# Patient Record
Sex: FEMALE | Race: BLACK OR AFRICAN AMERICAN | NOT HISPANIC OR LATINO | URBAN - METROPOLITAN AREA
[De-identification: names, ages, dates, MRNs, and addresses within clinical notes are randomized per-mention and may not be internally consistent; named-entity substitution may affect disease eponyms.]

---

## 2017-01-24 ENCOUNTER — INPATIENT (INPATIENT)
Facility: HOSPITAL | Age: 82
LOS: 2 days | Discharge: HOME HEALTH | DRG: 683 | End: 2017-01-27
Attending: INTERNAL MEDICINE | Admitting: INTERNAL MEDICINE
Payer: COMMERCIAL

## 2017-01-24 VITALS
SYSTOLIC BLOOD PRESSURE: 167 MMHG | OXYGEN SATURATION: 100 % | DIASTOLIC BLOOD PRESSURE: 79 MMHG | TEMPERATURE: 98 F | RESPIRATION RATE: 17 BRPM | HEART RATE: 68 BPM

## 2017-01-24 DIAGNOSIS — D64.9 ANEMIA, UNSPECIFIED: ICD-10-CM

## 2017-01-24 DIAGNOSIS — E87.6 HYPOKALEMIA: ICD-10-CM

## 2017-01-24 DIAGNOSIS — E83.51 HYPOCALCEMIA: ICD-10-CM

## 2017-01-24 DIAGNOSIS — E83.42 HYPOMAGNESEMIA: ICD-10-CM

## 2017-01-24 DIAGNOSIS — R60.0 LOCALIZED EDEMA: ICD-10-CM

## 2017-01-24 DIAGNOSIS — Z90.49 ACQUIRED ABSENCE OF OTHER SPECIFIED PARTS OF DIGESTIVE TRACT: Chronic | ICD-10-CM

## 2017-01-24 DIAGNOSIS — Z90.710 ACQUIRED ABSENCE OF BOTH CERVIX AND UTERUS: Chronic | ICD-10-CM

## 2017-01-24 DIAGNOSIS — I50.22 CHRONIC SYSTOLIC (CONGESTIVE) HEART FAILURE: ICD-10-CM

## 2017-01-24 DIAGNOSIS — I25.10 ATHEROSCLEROTIC HEART DISEASE OF NATIVE CORONARY ARTERY WITHOUT ANGINA PECTORIS: ICD-10-CM

## 2017-01-24 DIAGNOSIS — J45.909 UNSPECIFIED ASTHMA, UNCOMPLICATED: ICD-10-CM

## 2017-01-24 DIAGNOSIS — Z92.89 PERSONAL HISTORY OF OTHER MEDICAL TREATMENT: Chronic | ICD-10-CM

## 2017-01-24 DIAGNOSIS — R25.1 TREMOR, UNSPECIFIED: ICD-10-CM

## 2017-01-24 DIAGNOSIS — N18.4 CHRONIC KIDNEY DISEASE, STAGE 4 (SEVERE): ICD-10-CM

## 2017-01-24 LAB
ALBUMIN SERPL ELPH-MCNC: 2.3 G/DL — LOW (ref 3.4–5)
ALBUMIN SERPL ELPH-MCNC: 2.3 G/DL — LOW (ref 3.4–5)
ALBUMIN SERPL ELPH-MCNC: 2.6 G/DL — LOW (ref 3.4–5)
ALP SERPL-CCNC: 54 U/L — SIGNIFICANT CHANGE UP (ref 40–120)
ALP SERPL-CCNC: 59 U/L — SIGNIFICANT CHANGE UP (ref 40–120)
ALP SERPL-CCNC: 63 U/L — SIGNIFICANT CHANGE UP (ref 40–120)
ALT FLD-CCNC: 19 U/L — SIGNIFICANT CHANGE UP (ref 12–42)
ALT FLD-CCNC: 21 U/L — SIGNIFICANT CHANGE UP (ref 12–42)
ALT FLD-CCNC: 24 U/L — SIGNIFICANT CHANGE UP (ref 12–42)
ANION GAP SERPL CALC-SCNC: 15 MMOL/L — SIGNIFICANT CHANGE UP (ref 9–16)
ANION GAP SERPL CALC-SCNC: 16 MMOL/L — SIGNIFICANT CHANGE UP (ref 9–16)
ANION GAP SERPL CALC-SCNC: 19 MMOL/L — HIGH (ref 9–16)
ANISOCYTOSIS BLD QL: SLIGHT — SIGNIFICANT CHANGE UP
APTT BLD: 36.4 SEC — SIGNIFICANT CHANGE UP (ref 27.5–37.4)
AST SERPL-CCNC: 19 U/L — SIGNIFICANT CHANGE UP (ref 15–37)
AST SERPL-CCNC: 26 U/L — SIGNIFICANT CHANGE UP (ref 15–37)
AST SERPL-CCNC: 33 U/L — SIGNIFICANT CHANGE UP (ref 15–37)
BASOPHILS NFR BLD AUTO: 0.2 % — SIGNIFICANT CHANGE UP (ref 0–2)
BILIRUB SERPL-MCNC: 0.6 MG/DL — SIGNIFICANT CHANGE UP (ref 0.2–1.2)
BILIRUB SERPL-MCNC: 0.6 MG/DL — SIGNIFICANT CHANGE UP (ref 0.2–1.2)
BILIRUB SERPL-MCNC: 1 MG/DL — SIGNIFICANT CHANGE UP (ref 0.2–1.2)
BLD GP AB SCN SERPL QL: NEGATIVE — SIGNIFICANT CHANGE UP
BUN SERPL-MCNC: 47 MG/DL — HIGH (ref 7–23)
BUN SERPL-MCNC: 52 MG/DL — HIGH (ref 7–23)
BUN SERPL-MCNC: 52 MG/DL — HIGH (ref 7–23)
CALCIUM SERPL-MCNC: <5 MG/DL — CRITICAL LOW (ref 8.5–10.5)
CHLORIDE SERPL-SCNC: 108 MMOL/L — SIGNIFICANT CHANGE UP (ref 96–108)
CHLORIDE SERPL-SCNC: 109 MMOL/L — HIGH (ref 96–108)
CHLORIDE SERPL-SCNC: 111 MMOL/L — HIGH (ref 96–108)
CK MB CFR SERPL CALC: 3.2 NG/ML — SIGNIFICANT CHANGE UP (ref 0.5–3.6)
CK MB CFR SERPL CALC: 3.6 NG/ML — SIGNIFICANT CHANGE UP (ref 0.5–3.6)
CK SERPL-CCNC: 368 U/L — HIGH (ref 26–192)
CK SERPL-CCNC: 383 U/L — HIGH (ref 26–192)
CO2 SERPL-SCNC: 19 MMOL/L — LOW (ref 22–31)
CO2 SERPL-SCNC: 20 MMOL/L — LOW (ref 22–31)
CO2 SERPL-SCNC: 21 MMOL/L — LOW (ref 22–31)
CREAT SERPL-MCNC: 2.34 MG/DL — HIGH (ref 0.5–1.3)
CREAT SERPL-MCNC: 2.46 MG/DL — HIGH (ref 0.5–1.3)
CREAT SERPL-MCNC: 2.47 MG/DL — HIGH (ref 0.5–1.3)
DACRYOCYTES BLD QL SMEAR: SLIGHT — SIGNIFICANT CHANGE UP
EOSINOPHIL NFR BLD AUTO: 0.5 % — SIGNIFICANT CHANGE UP (ref 0–6)
GLUCOSE SERPL-MCNC: 56 MG/DL — LOW (ref 70–99)
GLUCOSE SERPL-MCNC: 57 MG/DL — LOW (ref 70–99)
GLUCOSE SERPL-MCNC: 73 MG/DL — SIGNIFICANT CHANGE UP (ref 70–99)
HCT VFR BLD CALC: 32.2 % — LOW (ref 34.5–45)
HGB BLD-MCNC: 10.5 G/DL — LOW (ref 11.5–15.5)
HYPOCHROMIA BLD QL: SLIGHT — SIGNIFICANT CHANGE UP
INR BLD: 1.41 — HIGH (ref 0.88–1.16)
LYMPHOCYTES # BLD AUTO: 8.2 % — LOW (ref 13–44)
MCHC RBC-ENTMCNC: 24.8 PG — LOW (ref 27–34)
MCHC RBC-ENTMCNC: 32.6 G/DL — SIGNIFICANT CHANGE UP (ref 32–36)
MCV RBC AUTO: 75.9 FL — LOW (ref 80–100)
MICROCYTES BLD QL: SLIGHT — SIGNIFICANT CHANGE UP
MONOCYTES NFR BLD AUTO: 5.9 % — SIGNIFICANT CHANGE UP (ref 2–14)
NEUTROPHILS NFR BLD AUTO: 85.2 % — HIGH (ref 43–77)
OVALOCYTES BLD QL SMEAR: SLIGHT — SIGNIFICANT CHANGE UP
PLAT MORPH BLD: NORMAL — SIGNIFICANT CHANGE UP
PLATELET # BLD AUTO: 161 K/UL — SIGNIFICANT CHANGE UP (ref 150–400)
POIKILOCYTOSIS BLD QL AUTO: SLIGHT — SIGNIFICANT CHANGE UP
POTASSIUM SERPL-MCNC: 2.9 MMOL/L — CRITICAL LOW (ref 3.5–5.3)
POTASSIUM SERPL-MCNC: 2.9 MMOL/L — CRITICAL LOW (ref 3.5–5.3)
POTASSIUM SERPL-MCNC: 4.1 MMOL/L — SIGNIFICANT CHANGE UP (ref 3.5–5.3)
POTASSIUM SERPL-SCNC: 2.9 MMOL/L — CRITICAL LOW (ref 3.5–5.3)
POTASSIUM SERPL-SCNC: 2.9 MMOL/L — CRITICAL LOW (ref 3.5–5.3)
POTASSIUM SERPL-SCNC: 4.1 MMOL/L — SIGNIFICANT CHANGE UP (ref 3.5–5.3)
PROT SERPL-MCNC: 5.2 G/DL — LOW (ref 6.4–8.2)
PROT SERPL-MCNC: 5.5 G/DL — LOW (ref 6.4–8.2)
PROT SERPL-MCNC: 6 G/DL — LOW (ref 6.4–8.2)
PROTHROM AB SERPL-ACNC: 15.7 SEC — HIGH (ref 10–13.1)
RBC # BLD: 4.24 M/UL — SIGNIFICANT CHANGE UP (ref 3.8–5.2)
RBC # FLD: 19.3 % — HIGH (ref 10.3–16.9)
RBC BLD AUTO: (no result)
RH IG SCN BLD-IMP: POSITIVE — SIGNIFICANT CHANGE UP
SCHISTOCYTES BLD QL AUTO: SIGNIFICANT CHANGE UP
SODIUM SERPL-SCNC: 145 MMOL/L — SIGNIFICANT CHANGE UP (ref 135–145)
SODIUM SERPL-SCNC: 146 MMOL/L — HIGH (ref 135–145)
SODIUM SERPL-SCNC: 147 MMOL/L — HIGH (ref 135–145)
SPHEROCYTES BLD QL SMEAR: SIGNIFICANT CHANGE UP
TROPONIN I SERPL-MCNC: 0.21 NG/ML — HIGH (ref 0.01–0.04)
TROPONIN I SERPL-MCNC: 0.22 NG/ML — HIGH (ref 0.01–0.04)
WBC # BLD: 9.1 K/UL — SIGNIFICANT CHANGE UP (ref 3.8–10.5)
WBC # FLD AUTO: 9.1 K/UL — SIGNIFICANT CHANGE UP (ref 3.8–10.5)

## 2017-01-24 PROCEDURE — 99223 1ST HOSP IP/OBS HIGH 75: CPT

## 2017-01-24 PROCEDURE — 99285 EMERGENCY DEPT VISIT HI MDM: CPT | Mod: 25

## 2017-01-24 PROCEDURE — 93010 ELECTROCARDIOGRAM REPORT: CPT

## 2017-01-24 PROCEDURE — 93306 TTE W/DOPPLER COMPLETE: CPT | Mod: 26

## 2017-01-24 PROCEDURE — 71010: CPT | Mod: 26

## 2017-01-24 PROCEDURE — 93010 ELECTROCARDIOGRAM REPORT: CPT | Mod: 77

## 2017-01-24 RX ORDER — ATORVASTATIN CALCIUM 80 MG/1
40 TABLET, FILM COATED ORAL AT BEDTIME
Qty: 0 | Refills: 0 | Status: DISCONTINUED | OUTPATIENT
Start: 2017-01-24 | End: 2017-01-27

## 2017-01-24 RX ORDER — PREGABALIN 225 MG/1
1000 CAPSULE ORAL DAILY
Qty: 0 | Refills: 0 | Status: DISCONTINUED | OUTPATIENT
Start: 2017-01-24 | End: 2017-01-26

## 2017-01-24 RX ORDER — POTASSIUM CHLORIDE 20 MEQ
10 PACKET (EA) ORAL ONCE
Qty: 0 | Refills: 0 | Status: COMPLETED | OUTPATIENT
Start: 2017-01-24 | End: 2017-01-24

## 2017-01-24 RX ORDER — ZINC GLUCONATE 30 MG
100 TABLET ORAL DAILY
Qty: 0 | Refills: 0 | Status: DISCONTINUED | OUTPATIENT
Start: 2017-01-24 | End: 2017-01-27

## 2017-01-24 RX ORDER — LEVALBUTEROL 1.25 MG/.5ML
0.63 SOLUTION, CONCENTRATE RESPIRATORY (INHALATION) EVERY 4 HOURS
Qty: 0 | Refills: 0 | Status: DISCONTINUED | OUTPATIENT
Start: 2017-01-24 | End: 2017-01-27

## 2017-01-24 RX ORDER — DIPHENHYDRAMINE HCL 50 MG
1 CAPSULE ORAL
Qty: 0 | Refills: 0 | COMMUNITY

## 2017-01-24 RX ORDER — POTASSIUM CHLORIDE 20 MEQ
40 PACKET (EA) ORAL ONCE
Qty: 0 | Refills: 0 | Status: COMPLETED | OUTPATIENT
Start: 2017-01-24 | End: 2017-01-24

## 2017-01-24 RX ORDER — NITROGLYCERIN 6.5 MG
0.4 CAPSULE, EXTENDED RELEASE ORAL ONCE
Qty: 0 | Refills: 0 | Status: COMPLETED | OUTPATIENT
Start: 2017-01-24 | End: 2017-01-24

## 2017-01-24 RX ORDER — LORATADINE 10 MG/1
1 TABLET ORAL
Qty: 0 | Refills: 0 | COMMUNITY

## 2017-01-24 RX ORDER — FOLIC ACID 0.8 MG
1 TABLET ORAL DAILY
Qty: 0 | Refills: 0 | Status: DISCONTINUED | OUTPATIENT
Start: 2017-01-24 | End: 2017-01-27

## 2017-01-24 RX ORDER — ACETAMINOPHEN 500 MG
650 TABLET ORAL EVERY 6 HOURS
Qty: 0 | Refills: 0 | Status: DISCONTINUED | OUTPATIENT
Start: 2017-01-24 | End: 2017-01-27

## 2017-01-24 RX ORDER — CALCIUM GLUCONATE 100 MG/ML
2 VIAL (ML) INTRAVENOUS ONCE
Qty: 0 | Refills: 0 | Status: COMPLETED | OUTPATIENT
Start: 2017-01-24 | End: 2017-01-24

## 2017-01-24 RX ORDER — CHOLECALCIFEROL (VITAMIN D3) 125 MCG
400 CAPSULE ORAL DAILY
Qty: 0 | Refills: 0 | Status: DISCONTINUED | OUTPATIENT
Start: 2017-01-24 | End: 2017-01-27

## 2017-01-24 RX ORDER — MAGNESIUM SULFATE 500 MG/ML
1 VIAL (ML) INJECTION ONCE
Qty: 0 | Refills: 0 | Status: COMPLETED | OUTPATIENT
Start: 2017-01-24 | End: 2017-01-24

## 2017-01-24 RX ORDER — CALCIUM GLUCONATE 100 MG/ML
1 VIAL (ML) INTRAVENOUS ONCE
Qty: 0 | Refills: 0 | Status: DISCONTINUED | OUTPATIENT
Start: 2017-01-24 | End: 2017-01-24

## 2017-01-24 RX ORDER — THIAMINE MONONITRATE (VIT B1) 100 MG
100 TABLET ORAL DAILY
Qty: 0 | Refills: 0 | Status: DISCONTINUED | OUTPATIENT
Start: 2017-01-24 | End: 2017-01-27

## 2017-01-24 RX ORDER — PANTOPRAZOLE SODIUM 20 MG/1
40 TABLET, DELAYED RELEASE ORAL
Qty: 0 | Refills: 0 | Status: DISCONTINUED | OUTPATIENT
Start: 2017-01-24 | End: 2017-01-27

## 2017-01-24 RX ORDER — TAMSULOSIN HYDROCHLORIDE 0.4 MG/1
0.4 CAPSULE ORAL AT BEDTIME
Qty: 0 | Refills: 0 | Status: DISCONTINUED | OUTPATIENT
Start: 2017-01-24 | End: 2017-01-27

## 2017-01-24 RX ORDER — FLUTICASONE PROPIONATE AND SALMETEROL 50; 250 UG/1; UG/1
1 POWDER ORAL; RESPIRATORY (INHALATION)
Qty: 0 | Refills: 0 | Status: DISCONTINUED | OUTPATIENT
Start: 2017-01-24 | End: 2017-01-27

## 2017-01-24 RX ORDER — POTASSIUM CHLORIDE 20 MEQ
40 PACKET (EA) ORAL ONCE
Qty: 0 | Refills: 0 | Status: DISCONTINUED | OUTPATIENT
Start: 2017-01-24 | End: 2017-01-24

## 2017-01-24 RX ORDER — MAGNESIUM HYDROXIDE 400 MG/1
15 TABLET, CHEWABLE ORAL
Qty: 0 | Refills: 0 | COMMUNITY

## 2017-01-24 RX ORDER — HYDRALAZINE HCL 50 MG
50 TABLET ORAL EVERY 8 HOURS
Qty: 0 | Refills: 0 | Status: DISCONTINUED | OUTPATIENT
Start: 2017-01-24 | End: 2017-01-27

## 2017-01-24 RX ORDER — CARVEDILOL PHOSPHATE 80 MG/1
6.25 CAPSULE, EXTENDED RELEASE ORAL EVERY 12 HOURS
Qty: 0 | Refills: 0 | Status: DISCONTINUED | OUTPATIENT
Start: 2017-01-24 | End: 2017-01-27

## 2017-01-24 RX ORDER — TUBERCULIN PURIFIED PROTEIN DERIVATIVE 5 [IU]/.1ML
0 INJECTION, SOLUTION INTRADERMAL
Qty: 0 | Refills: 0 | COMMUNITY

## 2017-01-24 RX ORDER — AMLODIPINE BESYLATE 2.5 MG/1
1 TABLET ORAL
Qty: 0 | Refills: 0 | COMMUNITY

## 2017-01-24 RX ORDER — LORATADINE 10 MG/1
10 TABLET ORAL DAILY
Qty: 0 | Refills: 0 | Status: DISCONTINUED | OUTPATIENT
Start: 2017-01-24 | End: 2017-01-27

## 2017-01-24 RX ADMIN — Medication 0.4 MILLIGRAM(S): at 13:07

## 2017-01-24 RX ADMIN — Medication 100 GRAM(S): at 14:48

## 2017-01-24 RX ADMIN — Medication 50 MILLIEQUIVALENT(S): at 17:45

## 2017-01-24 RX ADMIN — FLUTICASONE PROPIONATE AND SALMETEROL 1 DOSE(S): 50; 250 POWDER ORAL; RESPIRATORY (INHALATION) at 22:40

## 2017-01-24 RX ADMIN — Medication 0.4 MILLIGRAM(S): at 12:51

## 2017-01-24 RX ADMIN — Medication 50 MILLIGRAM(S): at 21:10

## 2017-01-24 RX ADMIN — Medication 100 GRAM(S): at 16:06

## 2017-01-24 RX ADMIN — Medication 100 MILLIEQUIVALENT(S): at 14:11

## 2017-01-24 RX ADMIN — ATORVASTATIN CALCIUM 40 MILLIGRAM(S): 80 TABLET, FILM COATED ORAL at 21:11

## 2017-01-24 RX ADMIN — TAMSULOSIN HYDROCHLORIDE 0.4 MILLIGRAM(S): 0.4 CAPSULE ORAL at 21:10

## 2017-01-24 RX ADMIN — Medication 40 MILLIEQUIVALENT(S): at 18:32

## 2017-01-24 RX ADMIN — Medication 200 GRAM(S): at 15:42

## 2017-01-24 NOTE — H&P ADULT. - HISTORY OF PRESENT ILLNESS
History obtained from patient, daughter Pascale Basilio, prior history and hospital notes    83 y/o F DNR/DNI (MOLST form copy from Rehab in chart) Multiple allergies including ASA, Shellfish, ARB Allergy from [facility], w/ PMHX HTN, HLD, recent diagnosis of Takotsubo Cardiomyopathy/Systolic CHF EF 25-30% on Echo in 12/2016, CKD stage 4 w/ h/o Urinary Retention, Asthma/COPD s/p recent exacerbation in 12/2016, Temporal Arteritis complicated by L eye legal blindness, RA, GERD, Chronic Anemia s/p blood transfusion in 10/2017, 11/2017 and 12/2017, h/o hypocalcemia, SBO s/p bowel resection (10/2016) w/ h/o Short Gut Syndrome  initially lost 15-20 lb weight loss and then gained back 5lb since procedure, admitted to [facility] in 11-12/2016 w/ Asthma Exacerbation 2nd to +RSV, readmitted in 12/2016 w/ tachypnea and hypertensive urgency diagnosed w/ Asthma Exacerbation 2nd RSV, noted to have NSTEMI peak trop 5.9 medically managed w/ Plavix and Hep gtt (both d/c’d that admission due to acute on chronic anemia), treated w/ BB for SVT, irby was removed and pt passed trial of void for urinary retention and discharged to rehab 12/31/17, who presents from rehab to [facility] ER 1/24/17, today, due to 3/10 SS "heavy" intermittent non-radiating C/P lasting 5 minutes to 30 minutes. Pt BIBA to [facility], on arrival to [facility] due to C/P pt received SL NTG x 2 w/ relief. Pt also noted mild tremors per patient and daughter to b/l shoulder and UE 2nd to acute hypokalemia, acute hypocalcemia and acute hypomagnesemia. Daughter reports h/o LE edema improved.  Pt denies SOB, dizziness, diaphoresis, fatigue/drowsiness, palpitations, orthopnea, PND, syncope, muscle cramping, confusion paresthesias, tingling in the lips and fingers, irritability, constipation, seizures, insomnia, muscle weakness.     In ER /74, HR 68, RR 16, T 97.5, O2 100% on RA. Labs significant for K 2.9, repeat K 4.1 (moderate hemolysis), repeat K 2.9, Ca < 5.0 Albumin 2.6  (Ca was 6.3-8.0 in 12/2016), Mg 1.1, Troponin 0.223, CPK  383, BNP 12,000 (BNP was 2,900 in 12/2016) CXR reported as clear today per official radiology read, BUN/Cr. 47-52/2.3-2.4 in ER today (Cr. 1.61-2.08 in 12/2016), Na 146-147, repeat Na 145, H/H 10.5/35 (H/H noted to be 7.9/24 -10.5/32 in 12/2017, INR 1.4 (LFTs normal), EKG in ER showed SR @ 71bpm w/ PACs, new TWI in V2-V6, AVL, ST depression 1mm in V3, 0.5mm in V6, old TWI in AVL. Initial QTC on , repeat . Pt received Potassium Chloride 10meq IV and Magnesium 1g IV only at the time of me seeing the patient. Pt reported being C/P free and w/ much reduced b/l shoulder and UE tremors on assessment.  Pt admitted to [facility] for further management.   Echo 12/2016 showed: mild LVH, entire apical two thirds of LV are severely hypokinetic to akinetic, wall motion abnormalities are consistent with apical ballooning (Takasubu) syndrome vs. injury at the left anterior descending territory, EF 25-30%, mild AR, mod. TR, mild pulm HTN (PASP 45mmHg), mild elev. LA pressure, small pericardial effusion.

## 2017-01-24 NOTE — ED ADULT NURSE NOTE - OBJECTIVE STATEMENT
Pt w/ PMH of heart attack on 12/23/16, w/o stent placement, asthma and CHF presents to ED today c/o mid-epigastric chest pressure without radiation for 3 hours.  Pt describes discomfort as constant pressure, but denies pain.  Pt states it is difficult to catch her breath, but her O2 sat is 100% on room air and she has unlabored respirations.  Pt also states increased pedal edema for the past several weeks, but peripheral pulses are palpable and equal in lower extremities.  Pt denies abd discomfort, N/V, changes to bowel or bladder habits, visual changes, light-headedness/dizziness or headache.  Pt states she has not eaten today d/t her discomfort.  Pt is resting on monitor with family at bedside.

## 2017-01-24 NOTE — H&P ADULT. - PROBLEM SELECTOR PLAN 2
No ACEI/ARB due to ACEI allergy and worsening CKD. Hold Lasix due to acute hypokalemia, acute hypocalcemia and acute hypomagnesemia, worsening renal function per Randi De Luna (Renal consult).  CXR showed clear lungs. BNP elevated likely in the setting of Cr. 2.4. Mild crackles R base. O2 sat. 100% on RA. Cont. to monitor. Closely monitor fluid status, electrolytes and assess resuming Lasix tomorrow    HTN  BP stable. Cont. Coreg, Hydralazine. Lasix on hold. No ACEI/ARB due to ACEI allergy and worsening CKD. Hold Lasix due to acute hypokalemia, acute hypocalcemia and acute hypomagnesemia, worsening renal function per Randi De Luna (Renal consult).  CXR showed clear lungs. BNP elevated likely in the setting of Cr. 2.4. Mild crackles R base. O2 sat. 100% on RA. Cont. to monitor. Closely monitor fluid status, electrolytes and assess resuming Lasix tomorrow    HTN  BP stable. Cont. Coreg, Hydralazine. Lasix on hold

## 2017-01-24 NOTE — CONSULT NOTE ADULT - SUBJECTIVE AND OBJECTIVE BOX
HPI:  History obtained from patient, daughter Pascale Basilio, prior history and hospital notes    85 y/o F DNR/DNI (MOLST form copy from Rehab in chart) Multiple allergies including ASA, Shellfish, ARB Allergy from [facility], w/ PMHX HTN, HLD, recent diagnosis of Takotsubo Cardiomyopathy/Systolic CHF EF 25-30% on Echo in 2016, CKD stage 4 w/ h/o Urinary Retention, Asthma/COPD s/p recent exacerbation in 2016, Temporal Arteritis complicated by L eye legal blindness, RA, GERD, Chronic Anemia s/p blood transfusion in 10/2017, 2017 and 2017, h/o hypocalcemia, SBO s/p bowel resection (10/2016) w/ h/o Short Gut Syndrome  initially lost 15-20 lb weight loss and then gained back 5lb since procedure, admitted to [facility] in -2016 w/ Asthma Exacerbation 2nd to +RSV, readmitted in 2016 w/ tachypnea and hypertensive urgency diagnosed w/ Asthma Exacerbation 2nd RSV, noted to have NSTEMI peak trop 5.9 medically managed w/ Plavix and Hep gtt (both d/c’d that admission due to acute on chronic anemia), treated w/ BB for SVT, irby was removed and pt passed trial of void for urinary retention and discharged to rehab 17. Presents today with CP, found to have SCr 2.4, calcium 5, K 2.9.     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: S/P partial hysterectomy      MEDICATIONS:  levalbuterol Inhalation 0.63milliGRAM(s) Inhalation every 4 hours PRN  predniSONE   Tablet 10milliGRAM(s) Oral daily  acetaminophen   Tablet 650milliGRAM(s) Oral every 6 hours PRN  tamsulosin 0.4milliGRAM(s) Oral at bedtime  loratadine 10milliGRAM(s) Oral daily  atorvastatin 40milliGRAM(s) Oral at bedtime  carvedilol 6.25milliGRAM(s) Oral every 12 hours  fluticasone / salmeterol 250-50 MICROgram(s) Diskus 1Dose(s) Inhalation two times a day  zinc gluconate 100milliGRAM(s) Oral daily  pantoprazole    Tablet 40milliGRAM(s) Oral before breakfast  hydrALAZINE 50milliGRAM(s) Oral every 8 hours  calcium carbonate 1250 mG + Vitamin D (OsCal 500 + D) 1Tablet(s) Oral two times a day  multivitamin 1Tablet(s) Oral daily  cyanocobalamin 1000MICROGram(s) Oral daily  folic acid 1milliGRAM(s) Oral daily  thiamine 100milliGRAM(s) Oral daily  cholecalciferol 400Unit(s) Oral daily      No pertinent family history in first degree relatives      SOCIAL HISTORY: No smoking.     REVIEW OF SYSTEMS:  Constitutional: No fevers. FATIGUED.  Card: No chest pain.   Resp: No SOB.  GI: No nausea or vomiting. No abdominal pain.  : No dysuria. Neuro: No focal weakness or sensory loss.  Eyes: No visual symptoms. MS: No joint swelling.  Skin: No rashes. Psych: No psychosis.    PHYSICAL EXAM:    Constitutional: T(C): 36.7, Max: 36.9 (-24 @ 15:27)  HR: 63 (63 - 81)  BP: 154/64 (144/80 - 179/79)  RR: 16 (16 - 17)  SpO2: 98% (98% - 100%)  Wt(kg): --  Appearance: LETHARGIC EASILY AROUSABLE.   Eyes: Conjunctivae pink, moist. Pupils equal and reactive.  ENT: External ears/nose normal appearing. Lips normal, dentition good.  Lymphatic: No cervical lymphadenopathy. No supraclavicular lymphadenopathy.  Cardiac: No rubs. NO MURMURS. Extremities: PITTING BILATERAL ANKLE EDEMA.   Respiratory: Effort no accessory muscle use. Lungs: DECREASED BREATH SOUNDS.   Abdomen: Soft. No masses. Nontender. Liver: Not palpable. Spleen: Not palpable.  Musculoskeltal digits: No clubbing or cyanosis. Tone normal. Moves all four extremities.  Skin inspection: No rashes. Palpation: No abnormalities.  Neuro: Cranial nerves: no facial assymetry or deficits noted. Sensation: LE intact to light touch.  Psych: Oriented to person, place, situation. Mood/affect: DEPRESSED.     DATA:  145    |  109<H>  |  52<H>  ----------------------------<  73     Ca:<5.0<LL> (2017 15:43)  2.9<LL>   |  21<L>  |  2.46<H>      eGFR if Non : 17 <L>  eGFR if African American: 20 <L>    TPro  5.2 g/dL<L>  /  Alb  2.3 g/dL<L>  /  TBili  1.0 mg/dL  /  DBili  x      /  AST  19 U/L  /  ALT  19 U/L  /  AlkPhos  54 U/L  2017 15:43                        10.5<L>  9.1   )-----------( 161      ( 2017 12:20 )             32.2<L>    Phos:-- M.1 mg/dL<L> PTH:-- Uric acid:-- Serum Osm:--  Ferritin:-- Iron:-- TIBC:-- Tsat:--  B12:-- TSH:-- ( @ 12:20)

## 2017-01-24 NOTE — ED ADULT NURSE NOTE - PSH
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

## 2017-01-24 NOTE — ED ADULT NURSE REASSESSMENT NOTE - NS ED NURSE REASSESS COMMENT FT1
Pt stated need for bedpan.  Pt able to lift self onto bedpan w/o complaint.  Pt made medium sized BM, soft, non-formed.  Pt repositioned in bed for comfort.  Repeat ECG performed and evaluated by Shelton.  Pt tx'd with 0.4mg Nitro.  Will continue to monitor.

## 2017-01-24 NOTE — ED PROVIDER NOTE - PROGRESS NOTE DETAILS
Patients pain significantly improved after 2 sublingual nitro, repeat EKG unchanged from EKG #2. Dr. Bowden notified, awaiting troponin. Will admit regardless, pt allergic to ASA, will consider heparin, pending trop vs possible non emergent cath for NSTEMI.

## 2017-01-24 NOTE — H&P ADULT. - PROBLEM SELECTOR PLAN 4
Potassium Chloride 10meq IV given in ER, last K 2.9. As per Dr. Bryan give Klor 40meq PO and Potassium 10meq IV and follow-up 11PM CMP. Further recs as per Dr. Bryan

## 2017-01-24 NOTE — ED PROVIDER NOTE - OBJECTIVE STATEMENT
83 yo female coming from [place] h/o CAD with recent admission for ACS allergic to ASA presenting to ER with CP that began this afternoon. Pt denies SOB, nausea, vomiting or abdominal pain. States that pain is now 5/10, non radiating. No weakness, changes in speech, numbness or tingling in extremities.

## 2017-01-24 NOTE — H&P ADULT. - PROBLEM SELECTOR PLAN 1
C/P relieved w/ NTG. H/o CAD. F/u Troponin at 6PM and 11PM. f/u Echo.   No ASA due to ASA allergy. No Plavix as per Dr. Jones due to h/o Anemia. Cont. Lipitor 40mg. Further plan as per Dr. Jones C/P relieved w/ NTG. H/o CAD. F/u Troponin at 6PM and 11PM. f/u Echo.   No ASA due to ASA allergy. No Plavix as per Dr. Jones due to h/o Anemia. Cont. Coreg,  Lipitor 40mg

## 2017-01-24 NOTE — H&P ADULT. - NEUROLOGICAL DETAILS
no focal deficit, motor strength 5/5 in b/l UE and LE, sensation intact in all extremities/alert and oriented x 3

## 2017-01-24 NOTE — ED PROVIDER NOTE - MEDICAL DECISION MAKING DETAILS
83 yo female with h/o recent MI, CHF history on lasix presenting with worsening CP since this afternoon with evolving EKG changes. CP improved significantly after nitro x 2. Pt has allergy to ASA. Will hold off on heparin at this time as per Dr. Sanchez, replete electrolytes, admit to cardiology.

## 2017-01-24 NOTE — ED PROVIDER NOTE - CARE PLAN
Principal Discharge DX:	NSTEMI (non-ST elevated myocardial infarction)  Secondary Diagnosis:	Hypokalemia  Secondary Diagnosis:	Hypocalcemia

## 2017-01-24 NOTE — H&P ADULT. - PROBLEM SELECTOR PLAN 9
Trace edema. Mild erythema to B/L shin perhaps secondary to irritation from compression stocking and prior edema. B/L mild calf tenderness. H/o LE venous duplex in 12/2016 negative for DVT. Afebrile and no leukocytosis. Monitor for now as discussed w/ Dr. Jones. No need for repeat LE duplex at this time per Dr. Jones

## 2017-01-24 NOTE — H&P ADULT. - PROBLEM SELECTOR PLAN 10
H/H 10.5/35, check Iron studies, B12/folate.  Cont. folic acid, thiamine, B12    H/o Short Gut Syndrome  Per daughter and pt, pt w/o recent constipation or diarrhea and no recent need for Loperamide or Colace.   DVT PPX: Intermittent Pneumatic Compression B/L LE as per Dr. Jones. If patient does not tolerate Heparin Sub Cut per Dr. Jones.    Code Status: DNR/DNI  DNR/DNI form in chart Dr. Jones to sign. Copy of MOLST form in chart. DNR/DNI order in sunrise.       Case discussed w/ Dr. Jones and Dr. Bryan

## 2017-01-24 NOTE — ED ADULT NURSE REASSESSMENT NOTE - NS ED NURSE REASSESS COMMENT FT1
Transferred care of pt from Lolita and Charlie RNs for break coverage. [facility] PA at bedside at this time. Repeat CMP and iCa drawn prior to start of Ca gluconate administration as per [facility] PAs request, sent to lab. Pt being given 2G Ca gluconate over 30 minutes as discussed with MD Peoples, [facility] PAs, and pharmacy. Pt pending 2nd gram of Mag sulfate after completion of first gram. Per MD Peoples pt needs echo today, hold off until completion of electrolyte drips. Pt with VS stable, resting comfortably, family at bedside at this time. Will continue to monitor.

## 2017-01-24 NOTE — H&P ADULT. - ASSESSMENT
85 y/o F DNR/DNI (MOLST form copy from Rehab in chart) ASA, Shellfish, ARB Allergy from [facility], w/ PMHX HTN, HLD, recent diagnosis of Takotsubo Cardiomyopathy/Systolic CHF EF 25-30% 12/2016, CKD stage 4 w/ h/o Urinary Retention, Asthma/COPD s/p recent exacerbation, Temporal Arteritis complicated by L eye legal blindness, SBO s/p bowel resection, RA, GERD, Chronic Anemia s/p prior blood transfusion, h/o hypocalcemia, h/o Asthma Exacerbation 2nd to +RSV in 12/2016, NSTEMI 12/2016 who presented w/ C/P and acute hypokalemia, acute hypocalcemia and acute hypomagnesemia, worsening renal function

## 2017-01-24 NOTE — H&P ADULT. - PROBLEM SELECTOR PLAN 7
Cr. 2.3-2.4 in ER today (Cr. 1.61-2.08 in 12/2016). F/u UA, Urine Electrolytes, Urine Cx, Renal US and Bladder Scan for post void residual. Lasix on hold as discussed w/ Dr. Bryan. Monitor I/Os and volume status.  On Flomax (h/o urinary retention)

## 2017-01-24 NOTE — H&P ADULT. - PROBLEM SELECTOR PLAN 3
Ca < 5.0. Repleted w/ 2 g IV Calcium Gluconate as per Dr. Bryan. Prolonged  repeat 452. Closely monitor Ca and EKG. Repeat CMP 11PM. f/u Ionized Calcium. Cont. PO Vitamin D and Calcium. Check Vitamin D level, PTH level, AM Cortisol. Further recs as per Dr. Bryan

## 2017-01-24 NOTE — ED ADULT NURSE NOTE - CHPI ED SYMPTOMS NEG
no back pain/no vomiting/no chills/no dizziness/no syncope/no diaphoresis/no cough/no nausea/no fever

## 2017-01-24 NOTE — ED ADULT NURSE NOTE - PMH
Asthma  Asthma  Chronic kidney disease  CKD (chronic kidney disease)  Essential hypertension  HTN (hypertension)  Gastroesophageal reflux disease  GERD (gastroesophageal reflux disease)  Heart attack    SBO (small bowel obstruction)    Temporal arteritis

## 2017-01-24 NOTE — ED ADULT NURSE REASSESSMENT NOTE - NS ED NURSE REASSESS COMMENT FT1
Pt states improved chest discomfort about 2 doses of 0.4mg sublingual nitro.  Will continue to monitor.

## 2017-01-24 NOTE — H&P ADULT. - PROBLEM SELECTOR PLAN 6
Monitor for signs and symptoms of electrolyte disturbance: tremors, muscle cramping, confusion paresthesias, tingling in the lips and fingers, irritability, constipation, seizures, insomnia, muscle weakness.    Neuro exam w/o any deficit other than known L eye blindness. Strength 5/5 in all extremities.     Improving, mild and intermittent to b/l UE and shoulders on last assessment. Noted in setting of electrolyte imbalance. Check TSH, T3, T4.    Monitor tele closely Monitor for signs and symptoms of electrolyte disturbance: tremors, muscle cramping, confusion paresthesias, tingling in the lips and fingers, irritability, constipation, seizures, insomnia, muscle weakness    Neuro exam w/o any deficit other than known L eye blindness. Strength 5/5 in all extremities.     Improving, mild and intermittent to b/l UE and shoulders on last assessment. Noted in setting of electrolyte imbalance. Check TSH, T3, T4.    Monitor tele closely

## 2017-01-24 NOTE — H&P ADULT. - PROBLEM SELECTOR PLAN 8
Asthma  Cont. Xopenex, Advair, on Prednisone 10mg daily for RA (on Protonix for GI protection)    RA  Cont. Prednisone 10mg daily

## 2017-01-25 DIAGNOSIS — Z02.9 ENCOUNTER FOR ADMINISTRATIVE EXAMINATIONS, UNSPECIFIED: ICD-10-CM

## 2017-01-25 DIAGNOSIS — E27.49 OTHER ADRENOCORTICAL INSUFFICIENCY: ICD-10-CM

## 2017-01-25 DIAGNOSIS — Z00.8 ENCOUNTER FOR OTHER GENERAL EXAMINATION: ICD-10-CM

## 2017-01-25 DIAGNOSIS — E88.9 METABOLIC DISORDER, UNSPECIFIED: ICD-10-CM

## 2017-01-25 LAB
24R-OH-CALCIDIOL SERPL-MCNC: 12.9 NG/ML — LOW (ref 30–100)
ALBUMIN SERPL ELPH-MCNC: 2.2 G/DL — LOW (ref 3.4–5)
ALBUMIN SERPL ELPH-MCNC: 2.3 G/DL — LOW (ref 3.4–5)
ALP SERPL-CCNC: 54 U/L — SIGNIFICANT CHANGE UP (ref 40–120)
ALP SERPL-CCNC: 54 U/L — SIGNIFICANT CHANGE UP (ref 40–120)
ALT FLD-CCNC: 18 U/L — SIGNIFICANT CHANGE UP (ref 12–42)
ALT FLD-CCNC: 18 U/L — SIGNIFICANT CHANGE UP (ref 12–42)
ANION GAP SERPL CALC-SCNC: 12 MMOL/L — SIGNIFICANT CHANGE UP (ref 9–16)
ANION GAP SERPL CALC-SCNC: 14 MMOL/L — SIGNIFICANT CHANGE UP (ref 9–16)
ANION GAP SERPL CALC-SCNC: 15 MMOL/L — SIGNIFICANT CHANGE UP (ref 9–16)
APPEARANCE UR: CLEAR — SIGNIFICANT CHANGE UP
AST SERPL-CCNC: 17 U/L — SIGNIFICANT CHANGE UP (ref 15–37)
AST SERPL-CCNC: 19 U/L — SIGNIFICANT CHANGE UP (ref 15–37)
BACTERIA # UR AUTO: (no result) /HPF
BILIRUB SERPL-MCNC: 0.5 MG/DL — SIGNIFICANT CHANGE UP (ref 0.2–1.2)
BILIRUB SERPL-MCNC: 0.6 MG/DL — SIGNIFICANT CHANGE UP (ref 0.2–1.2)
BILIRUB UR-MCNC: NEGATIVE — SIGNIFICANT CHANGE UP
BUN SERPL-MCNC: 48 MG/DL — HIGH (ref 7–23)
BUN SERPL-MCNC: 49 MG/DL — HIGH (ref 7–23)
BUN SERPL-MCNC: 54 MG/DL — HIGH (ref 7–23)
CA-I BLD-SCNC: 0.68 MMOL/L — CRITICAL LOW (ref 1.05–1.34)
CALCIUM SERPL-MCNC: 5.2 MG/DL — CRITICAL LOW (ref 8.5–10.5)
CALCIUM SERPL-MCNC: 5.4 MG/DL — CRITICAL LOW (ref 8.4–10.5)
CALCIUM SERPL-MCNC: 5.6 MG/DL — CRITICAL LOW (ref 8.5–10.5)
CALCIUM SERPL-MCNC: 6 MG/DL — CRITICAL LOW (ref 8.5–10.5)
CHLORIDE SERPL-SCNC: 110 MMOL/L — HIGH (ref 96–108)
CHLORIDE SERPL-SCNC: 110 MMOL/L — HIGH (ref 96–108)
CHLORIDE SERPL-SCNC: 111 MMOL/L — HIGH (ref 96–108)
CHOLEST SERPL-MCNC: 120 MG/DL — SIGNIFICANT CHANGE UP
CK MB CFR SERPL CALC: 2.6 NG/ML — SIGNIFICANT CHANGE UP (ref 0.5–3.6)
CK SERPL-CCNC: 307 U/L — HIGH (ref 26–192)
CO2 SERPL-SCNC: 19 MMOL/L — LOW (ref 22–31)
CO2 SERPL-SCNC: 21 MMOL/L — LOW (ref 22–31)
CO2 SERPL-SCNC: 22 MMOL/L — SIGNIFICANT CHANGE UP (ref 22–31)
COLOR SPEC: YELLOW — SIGNIFICANT CHANGE UP
CORTIS AM PEAK SERPL-MCNC: 1.8 UG/DL — LOW (ref 3.9–37.5)
CREAT ?TM UR-MCNC: 38.6 MG/DL — SIGNIFICANT CHANGE UP
CREAT SERPL-MCNC: 2.26 MG/DL — HIGH (ref 0.5–1.3)
CREAT SERPL-MCNC: 2.36 MG/DL — HIGH (ref 0.5–1.3)
CREAT SERPL-MCNC: 2.44 MG/DL — HIGH (ref 0.5–1.3)
DIFF PNL FLD: NEGATIVE — SIGNIFICANT CHANGE UP
EPI CELLS # UR: SIGNIFICANT CHANGE UP /HPF
EXTRA GREEN TOP TUBE: SIGNIFICANT CHANGE UP
FERRITIN SERPL-MCNC: 664.4 NG/ML — HIGH (ref 8–252)
FOLATE SERPL-MCNC: >20 NG/ML — SIGNIFICANT CHANGE UP (ref 4.8–24.2)
GLUCOSE SERPL-MCNC: 124 MG/DL — HIGH (ref 70–99)
GLUCOSE SERPL-MCNC: 63 MG/DL — LOW (ref 70–99)
GLUCOSE SERPL-MCNC: 88 MG/DL — SIGNIFICANT CHANGE UP (ref 70–99)
GLUCOSE UR QL: NEGATIVE — SIGNIFICANT CHANGE UP
HCT VFR BLD CALC: 26.8 % — LOW (ref 34.5–45)
HDLC SERPL-MCNC: 72 MG/DL — SIGNIFICANT CHANGE UP
HGB BLD-MCNC: 9 G/DL — LOW (ref 11.5–15.5)
IRON SATN MFR SERPL: 10 % — LOW (ref 20–38)
IRON SATN MFR SERPL: 15 UG/DL — LOW (ref 50–170)
KETONES UR-MCNC: NEGATIVE — SIGNIFICANT CHANGE UP
LEUKOCYTE ESTERASE UR-ACNC: (no result)
LIPID PNL WITH DIRECT LDL SERPL: 31 MG/DL — SIGNIFICANT CHANGE UP
MAGNESIUM SERPL-MCNC: 1.1 MG/DL — LOW (ref 1.6–2.4)
MAGNESIUM SERPL-MCNC: 2.6 MG/DL — HIGH (ref 1.6–2.4)
MCHC RBC-ENTMCNC: 25 PG — LOW (ref 27–34)
MCHC RBC-ENTMCNC: 33.6 G/DL — SIGNIFICANT CHANGE UP (ref 32–36)
MCV RBC AUTO: 74.4 FL — LOW (ref 80–100)
NITRITE UR-MCNC: NEGATIVE — SIGNIFICANT CHANGE UP
OSMOLALITY UR: 338 MOSMOL/KG — SIGNIFICANT CHANGE UP (ref 100–650)
PH UR: 5.5 — SIGNIFICANT CHANGE UP (ref 4–8)
PLATELET # BLD AUTO: 168 K/UL — SIGNIFICANT CHANGE UP (ref 150–400)
POTASSIUM SERPL-MCNC: 3.4 MMOL/L — LOW (ref 3.5–5.3)
POTASSIUM SERPL-MCNC: 3.7 MMOL/L — SIGNIFICANT CHANGE UP (ref 3.5–5.3)
POTASSIUM SERPL-MCNC: 4.2 MMOL/L — SIGNIFICANT CHANGE UP (ref 3.5–5.3)
POTASSIUM SERPL-SCNC: 3.4 MMOL/L — LOW (ref 3.5–5.3)
POTASSIUM SERPL-SCNC: 3.7 MMOL/L — SIGNIFICANT CHANGE UP (ref 3.5–5.3)
POTASSIUM SERPL-SCNC: 4.2 MMOL/L — SIGNIFICANT CHANGE UP (ref 3.5–5.3)
POTASSIUM UR-SCNC: 17 MMOL/L — SIGNIFICANT CHANGE UP
PROT SERPL-MCNC: 5 G/DL — LOW (ref 6.4–8.2)
PROT SERPL-MCNC: 5.3 G/DL — LOW (ref 6.4–8.2)
PROT UR-MCNC: 100 MG/DL
PTH-INTACT FLD-MCNC: 131 PG/ML — HIGH (ref 15–65)
RBC # BLD: 3.6 M/UL — LOW (ref 3.8–5.2)
RBC # FLD: 18.6 % — HIGH (ref 10.3–16.9)
RBC CASTS # UR COMP ASSIST: < 5 /HPF — SIGNIFICANT CHANGE UP
SODIUM SERPL-SCNC: 144 MMOL/L — SIGNIFICANT CHANGE UP (ref 135–145)
SODIUM SERPL-SCNC: 145 MMOL/L — SIGNIFICANT CHANGE UP (ref 135–145)
SODIUM SERPL-SCNC: 145 MMOL/L — SIGNIFICANT CHANGE UP (ref 135–145)
SODIUM UR-SCNC: 95 MMOL/L — SIGNIFICANT CHANGE UP
SP GR SPEC: 1.02 — SIGNIFICANT CHANGE UP (ref 1–1.03)
T3 SERPL-MCNC: 51 NG/DL — LOW (ref 80–200)
T4 AB SER-ACNC: 5.44 UG/DL — SIGNIFICANT CHANGE UP (ref 3.17–11.72)
TIBC SERPL-MCNC: 146 UG/DL — LOW (ref 250–450)
TOTAL CHOLESTEROL/HDL RATIO MEASUREMENT: 1.7 RATIO — SIGNIFICANT CHANGE UP
TRIGL SERPL-MCNC: 83 MG/DL — SIGNIFICANT CHANGE UP
TROPONIN I SERPL-MCNC: 0.17 NG/ML — HIGH (ref 0.01–0.04)
TSH SERPL-MCNC: 1.61 UIU/ML — SIGNIFICANT CHANGE UP (ref 0.35–4.94)
URATE UR-MCNC: 10.9 MG/DL — SIGNIFICANT CHANGE UP
UROBILINOGEN FLD QL: 0.2 E.U./DL — SIGNIFICANT CHANGE UP
VIT B12 SERPL-MCNC: 1329 PG/ML — HIGH (ref 243–894)
WBC # BLD: 6.5 K/UL — SIGNIFICANT CHANGE UP (ref 3.8–10.5)
WBC # FLD AUTO: 6.5 K/UL — SIGNIFICANT CHANGE UP (ref 3.8–10.5)
WBC UR QL: (no result) /HPF

## 2017-01-25 PROCEDURE — 99233 SBSQ HOSP IP/OBS HIGH 50: CPT

## 2017-01-25 PROCEDURE — 76775 US EXAM ABDO BACK WALL LIM: CPT | Mod: 26

## 2017-01-25 PROCEDURE — 93010 ELECTROCARDIOGRAM REPORT: CPT

## 2017-01-25 PROCEDURE — 93010 ELECTROCARDIOGRAM REPORT: CPT | Mod: 77

## 2017-01-25 RX ORDER — HYDROCORTISONE 20 MG
25 TABLET ORAL ONCE
Qty: 0 | Refills: 0 | Status: COMPLETED | OUTPATIENT
Start: 2017-01-26 | End: 2017-01-26

## 2017-01-25 RX ORDER — MAGNESIUM SULFATE 500 MG/ML
2 VIAL (ML) INJECTION
Qty: 0 | Refills: 0 | Status: COMPLETED | OUTPATIENT
Start: 2017-01-25 | End: 2017-01-25

## 2017-01-25 RX ORDER — POTASSIUM CHLORIDE 20 MEQ
40 PACKET (EA) ORAL ONCE
Qty: 0 | Refills: 0 | Status: COMPLETED | OUTPATIENT
Start: 2017-01-25 | End: 2017-01-25

## 2017-01-25 RX ORDER — CALCIUM GLUCONATE 100 MG/ML
1 VIAL (ML) INTRAVENOUS ONCE
Qty: 0 | Refills: 0 | Status: DISCONTINUED | OUTPATIENT
Start: 2017-01-25 | End: 2017-01-25

## 2017-01-25 RX ORDER — HYDROCORTISONE 20 MG
50 TABLET ORAL ONCE
Qty: 0 | Refills: 0 | Status: COMPLETED | OUTPATIENT
Start: 2017-01-25 | End: 2017-01-25

## 2017-01-25 RX ORDER — POTASSIUM CHLORIDE 20 MEQ
20 PACKET (EA) ORAL ONCE
Qty: 0 | Refills: 0 | Status: COMPLETED | OUTPATIENT
Start: 2017-01-25 | End: 2017-01-25

## 2017-01-25 RX ORDER — CALCIUM GLUCONATE 100 MG/ML
2 VIAL (ML) INTRAVENOUS ONCE
Qty: 0 | Refills: 0 | Status: COMPLETED | OUTPATIENT
Start: 2017-01-25 | End: 2017-01-25

## 2017-01-25 RX ADMIN — PREGABALIN 1000 MICROGRAM(S): 225 CAPSULE ORAL at 12:28

## 2017-01-25 RX ADMIN — Medication 200 GRAM(S): at 02:39

## 2017-01-25 RX ADMIN — LEVALBUTEROL 0.63 MILLIGRAM(S): 1.25 SOLUTION, CONCENTRATE RESPIRATORY (INHALATION) at 12:29

## 2017-01-25 RX ADMIN — CARVEDILOL PHOSPHATE 6.25 MILLIGRAM(S): 80 CAPSULE, EXTENDED RELEASE ORAL at 17:41

## 2017-01-25 RX ADMIN — Medication 50 MILLIGRAM(S): at 22:31

## 2017-01-25 RX ADMIN — TAMSULOSIN HYDROCHLORIDE 0.4 MILLIGRAM(S): 0.4 CAPSULE ORAL at 22:31

## 2017-01-25 RX ADMIN — CARVEDILOL PHOSPHATE 6.25 MILLIGRAM(S): 80 CAPSULE, EXTENDED RELEASE ORAL at 06:13

## 2017-01-25 RX ADMIN — PANTOPRAZOLE SODIUM 40 MILLIGRAM(S): 20 TABLET, DELAYED RELEASE ORAL at 06:13

## 2017-01-25 RX ADMIN — Medication 40 MILLIEQUIVALENT(S): at 01:13

## 2017-01-25 RX ADMIN — Medication 50 MILLIGRAM(S): at 19:53

## 2017-01-25 RX ADMIN — Medication 10 MILLIGRAM(S): at 06:13

## 2017-01-25 RX ADMIN — Medication 1 MILLIGRAM(S): at 12:36

## 2017-01-25 RX ADMIN — Medication 200 GRAM(S): at 12:36

## 2017-01-25 RX ADMIN — FLUTICASONE PROPIONATE AND SALMETEROL 1 DOSE(S): 50; 250 POWDER ORAL; RESPIRATORY (INHALATION) at 16:26

## 2017-01-25 RX ADMIN — Medication 1 TABLET(S): at 12:29

## 2017-01-25 RX ADMIN — Medication 50 MILLIGRAM(S): at 16:26

## 2017-01-25 RX ADMIN — LORATADINE 10 MILLIGRAM(S): 10 TABLET ORAL at 12:28

## 2017-01-25 RX ADMIN — Medication 400 UNIT(S): at 12:28

## 2017-01-25 RX ADMIN — Medication 50 MILLIGRAM(S): at 06:13

## 2017-01-25 RX ADMIN — Medication 100 MILLIGRAM(S): at 12:36

## 2017-01-25 RX ADMIN — Medication 1 TABLET(S): at 12:36

## 2017-01-25 RX ADMIN — ATORVASTATIN CALCIUM 40 MILLIGRAM(S): 80 TABLET, FILM COATED ORAL at 22:31

## 2017-01-25 RX ADMIN — Medication 50 GRAM(S): at 04:00

## 2017-01-25 RX ADMIN — Medication 100 MILLIGRAM(S): at 12:28

## 2017-01-25 RX ADMIN — FLUTICASONE PROPIONATE AND SALMETEROL 1 DOSE(S): 50; 250 POWDER ORAL; RESPIRATORY (INHALATION) at 22:32

## 2017-01-25 RX ADMIN — Medication 50 GRAM(S): at 03:16

## 2017-01-25 RX ADMIN — Medication 20 MILLIEQUIVALENT(S): at 12:37

## 2017-01-25 RX ADMIN — Medication 1 TABLET(S): at 17:41

## 2017-01-25 NOTE — DIETITIAN INITIAL EVALUATION ADULT. - SOURCE
family/significant other/prior RD note 12/2016, pt known to RD from prior admit, MOL from Rehab center/patient

## 2017-01-25 NOTE — DIETITIAN INITIAL EVALUATION ADULT. - ADHERENCE
[facility] was [facility] lactose free[facility] in paper chart.

## 2017-01-25 NOTE — PROGRESS NOTE ADULT - PROBLEM SELECTOR PLAN 4
Monitor for signs and symptoms of electrolyte disturbance: tremors, muscle cramping, confusion paresthesias, tingling in the lips and fingers, irritability, constipation, seizures, insomnia, muscle weakness    Neuro exam w/o any deficit other than known L eye blindness. Strength 5/5 in all extremities.     Improving, mild and intermittent to b/l UE and shoulders on last assessment. Noted in setting of electrolyte imbalance. Check TSH, T3, T4.    Monitor tele closely Dr. Bryan consulted  ANTONIA on Admit.  BUN/Crt 48/2.26 (Baseline Cr. 1.61-2.08 in 12/2016), continue to monitor.   - UA: small LE, no UTI symptoms, f/u Ucx.  - f/u Renal US   - Continue Flomax (h/o urinary retention)

## 2017-01-25 NOTE — PROGRESS NOTE ADULT - SUBJECTIVE AND OBJECTIVE BOX
Interventional Cardiology PA Adult Progress Note    Subjective Assessment: Patient seen and examined at bedside.  States she is feeling well with improved tremors.  	  MEDICATIONS:  tamsulosin 0.4milliGRAM(s) Oral at bedtime  carvedilol 6.25milliGRAM(s) Oral every 12 hours  hydrALAZINE 50milliGRAM(s) Oral every 8 hours  levalbuterol Inhalation 0.63milliGRAM(s) Inhalation every 4 hours PRN  loratadine 10milliGRAM(s) Oral daily  fluticasone / salmeterol 250-50 MICROgram(s) Diskus 1Dose(s) Inhalation two times a day  acetaminophen   Tablet 650milliGRAM(s) Oral every 6 hours PRN  pantoprazole    Tablet 40milliGRAM(s) Oral before breakfast  predniSONE   Tablet 10milliGRAM(s) Oral daily  atorvastatin 40milliGRAM(s) Oral at bedtime  zinc gluconate 100milliGRAM(s) Oral daily  calcium carbonate 1250 mG + Vitamin D (OsCal 500 + D) 1Tablet(s) Oral two times a day  multivitamin 1Tablet(s) Oral daily  cyanocobalamin 1000MICROGram(s) Oral daily  folic acid 1milliGRAM(s) Oral daily  thiamine 100milliGRAM(s) Oral daily  cholecalciferol 400Unit(s) Oral daily      [PHYSICAL EXAM:  TELEMETRY:  T(C): 37.1, Max: 37.6 (01-24 @ 21:13)  HR: 67 (63 - 68)  BP: 134/76 (134/76 - 177/76)  RR: 16 (16 - 16)  SpO2: 97% (97% - 100%)    Height (cm): 157.5 (01-24 @ 19:55)  Weight (kg): 48 (01-24 @ 19:55)  BMI (kg/m2): 19.3 (01-24 @ 19:55)  BSA (m2): 1.46 (01-24 @ 19:55)    [place]: no  Central/PICC/Mid Line: no                                         Appearance: Normal, NAD	  HEENT:   Normal oral mucosa, PERRL, EOMI	  Neck: Supple, - JVD; Carotid Bruit   Cardiovascular: Normal S1 S2, No JVD, No murmurs,   Respiratory: Lungs clear to auscultation/No Rales, Rhonchi, Wheezing	  Gastrointestinal:  Soft, Non-tender, + BS	  Skin: No rashes, No ecchymoses, No cyanosis  Extremities: Normal range of motion, No clubbing, cyanosis or edema  Vascular: Peripheral pulses palpable 2+ bilaterally  Neurologic: Non-focal, mild tremor  Psychiatry: A & O x 3, Mood & affect appropriate      LABS:	 	  CARDIAC MARKERS:  Troponin I, Serum: 0.172 ng/mL (01-24 @ 23:58)  Troponin I, Serum: 0.209 ng/mL (01-24 @ 21:24)                          9.0    6.5   )-----------( 168      ( 25 Jan 2017 06:20 )             26.8     25 Jan 2017 06:20    145    |  111    |  48     ----------------------------<  63     3.7     |  19     |  2.26     Ca    5.6        25 Jan 2017 06:20  Mg     2.6       25 Jan 2017 06:20    TPro  5.0    /  Alb  2.3    /  TBili  0.6    /  DBili  x      /  AST  19     /  ALT  18     /  AlkPhos  54     25 Jan 2017 06:20    TSH: Thyroid Stimulating Hormone, Serum: 1.606 uIU/mL (01-25 @ 06:20)    PT/INR - ( 24 Jan 2017 12:20 )   PT: 15.7 sec;   INR: 1.41     PTT - ( 24 Jan 2017 12:20 )  PTT:36.4 sec    ASSESSMENT/PLAN: 	    DVT ppx: SCDs

## 2017-01-25 NOTE — CONSULT NOTE ADULT - SUBJECTIVE AND OBJECTIVE BOX
HPI:  History obtained from patient, daughter Pascale Basilio, prior history and hospital notes    83 y/o F DNR/DNI ([facility]OL[facility] form copy from Rehab in chart) Multiple allergies including ASA, Shellfish, ARB Allergy from [facility], w/ PMHX HTN, HLD, recent diagnosis of Takotsubo Cardiomyopathy/Systolic CHF EF 25-30% on Echo in 2016, CKD stage 4 w/ h/o Urinary Retention, Asthma/COPD s/p recent exacerbation in 2016, Temporal Arteritis complicated by L eye legal blindness, RA, GERD, Chronic Anemia s/p blood transfusion in 10/2017, 2017 and 2017, h/o hypocalcemia, SBO s/p bowel resection (10/2016) w/ h/o Short Gut Syndrome  initially lost 15-20 lb weight loss and then gained back 5lb since procedure, admitted to [facility] in -2016 w/ Asthma Exacerbation 2nd to +RSV, readmitted in 2016 w/ tachypnea and hypertensive urgency diagnosed w/ Asthma Exacerbation 2nd RSV, noted to have NSTEMI peak trop 5.9 medically managed w/ Plavix and Hep gtt (both d/c’d that admission due to acute on chronic anemia), treated w/ BB for SVT, irby was removed and pt passed trial of void for urinary retention and discharged to rehab 17, who presents from rehab to [facility] ER 17, today, due to 3/10 SS "heavy" intermittent non-radiating C/P lasting 5 minutes to 30 minutes. Pt BIBA to [facility], on arrival to [facility] due to C/P pt received SL NTG x 2 w/ relief. Pt also noted mild tremors per patient and daughter to b/l shoulder and UE 2nd to acute hypokalemia, acute hypocalcemia and acute hypomagnesemia. Daughter reports h/o LE edema improved.  Pt denies SOB, dizziness, diaphoresis, fatigue/drowsiness, palpitations, orthopnea, PND, syncope, muscle cramping, confusion paresthesias, tingling in the lips and fingers, irritability, constipation, seizures, insomnia, muscle weakness.     In ER /74, HR 68, RR 16, T 97.5, O2 100% on RA. Labs significant for K 2.9, repeat K 4.1 (moderate hemolysis), repeat K 2.9, Ca < 5.0 Albumin 2.6  (Ca was 6.3-8.0 in 2016), Mg 1.1, Troponin 0.223, CPK  383, BNP 12,000 (BNP was 2,900 in 2016) CXR reported as clear today per official radiology read, BUN/Cr. 47-52/2.3-2.4 in ER today (Cr. 1.61-2.08 in 2016), Na 146-147, repeat Na 145, H/H 10.5/35 (H/H noted to be 7.9/24 -10.5/32 in 2017, INR 1.4 (LFTs normal), EKG in ER showed SR @ 71bpm w/ PACs, new TWI in V2-V6, AVL, ST depression 1mm in V3, 0.5mm in V6, old TWI in AVL. Initial QTC on , repeat . Pt received Potassium Chloride 10meq IV and Magnesium 1g IV only at the time of me seeing the patient. Pt reported being C/P free and w/ much reduced b/l shoulder and UE tremors on assessment.  Pt admitted to [facility] for further management.   Echo 2016 showed: mild LVH, entire apical two thirds of LV are severely hypokinetic to akinetic, wall motion abnormalities are consistent with apical ballooning (Takasubu) syndrome vs. injury at the left anterior descending territory, EF 25-30%, mild AR, mod. TR, mild pulm HTN (PASP 45mmHg), mild elev. LA pressure, small pericardial effusion. (2017 18:40)      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: S/P partial hysterectomy      FAMILY HISTORY:  No pertinent family history in first degree relatives      SOCIAL HISTORY:  Smoking:  ETOH:  Drug use:    HOME MEDICATIONS:      MEDICATIONS  (STANDING):  predniSONE   Tablet 10milliGRAM(s) Oral daily  tamsulosin 0.4milliGRAM(s) Oral at bedtime  loratadine 10milliGRAM(s) Oral daily  atorvastatin 40milliGRAM(s) Oral at bedtime  carvedilol 6.25milliGRAM(s) Oral every 12 hours  fluticasone / salmeterol 250-50 MICROgram(s) Diskus 1Dose(s) Inhalation two times a day  zinc gluconate 100milliGRAM(s) Oral daily  pantoprazole    Tablet 40milliGRAM(s) Oral before breakfast  hydrALAZINE 50milliGRAM(s) Oral every 8 hours  calcium carbonate 1250 mG + Vitamin D (OsCal 500 + D) 1Tablet(s) Oral two times a day  multivitamin 1Tablet(s) Oral daily  cyanocobalamin 1000MICROGram(s) Oral daily  folic acid 1milliGRAM(s) Oral daily  thiamine 100milliGRAM(s) Oral daily  cholecalciferol 400Unit(s) Oral daily    MEDICATIONS  (PRN):  levalbuterol Inhalation 0.63milliGRAM(s) Inhalation every 4 hours PRN sob  acetaminophen   Tablet 650milliGRAM(s) Oral every 6 hours PRN Moderate Pain (4 - 6)      Allergies    angiotensin converting enzyme inhibitors (Angioedema)  aspirin (Unknown)  lactose (Diarrhea)  penicillin (Angioedema)  Seafood (Short breath)  shellfish (Short breath)  spinach (Unknown)    Intolerances        REVIEW OF SYSTEMS  CONSTITUTIONAL:  Negative fever or chills  EYES:  Negative visual field deficit, blurry vision or double vision  ENT:  Negative for sore throat, runny nose, or earache  CARDIOVASCULAR:  Negative for chest pain or palpitations  RESPIRATORY:  Negative for cough, wheezing, sputum production, or SOB   GASTROINTESTINAL:  Negative for nausea, vomiting, diarrhea, or abdominal pain  GENITOURINARY:  Negative frequency, urgency or dysuria  MUSCULOSKELETAL:  No joint pain or stiffness  INTEGUMENTARY: no rashes  NEUROLOGIC:  No headache, confusion, syncope or seizures  PSYCHIATRIC: No depression or anxiety  HEMATOLOGY AND ONCOLOGY:  No unusual infections or bleeding    CAPILLARY GLUCOSE:  CAPILLARY BLOOD GLUCOSE        LABS:                        9.0    6.5   )-----------( 168      ( 2017 06:20 )             26.8     2017 20:13    144    |  110    |  49     ----------------------------<  124    4.2     |  22     |  2.36     Ca    6.0        2017 20:13  Mg     2.6       2017 06:20    TPro  5.0    /  Alb  2.3    /  TBili  0.6    /  DBili  x      /  AST  19     /  ALT  18     /  AlkPhos  54     2017 06:20    PT/INR - ( 2017 12:20 )   PT: 15.7 sec;   INR: 1.41          PTT - ( 2017 12:20 )  PTT:36.4 sec  Urinalysis Basic - ( 2017 10:20 )    Color: Yellow / Appearance: Clear / S.020 / pH: x  Gluc: x / Ketone: NEGATIVE  / Bili: NEGATIVE / Urobili: 0.2 E.U./dL   Blood: x / Protein: 100 mg/dL / Nitrite: NEGATIVE   Leuk Esterase: Small / RBC: < 5 /HPF / WBC Many /HPF   Sq Epi: x / Non Sq Epi: Rare /HPF / Bacteria: Many /HPF        RADIOLOGY & ADDITIONAL STUDIES:      Physical Examination:  Vital Signs Last 24 Hrs  T(C): 36.7, Max: 37.5 ( @ 01:05)  T(F): 98, Max: 99.5 ( @ 01:05)  HR: 74 (64 - 74)  BP: 161/74 (134/76 - 174/69)  BP(mean): --  RR: 17 (16 - 17)  SpO2: 96% (96% - 99%)    Constitutional: wn/wd in NAD.   HEENT: NCAT, MMM, OP clear, EOMI, , no proptosis or lid retraction  Neck: no thyromegaly or palpable thyroid nodules   Respiratory: lungs CTAB.  Cardiovascular: regular rhythm, normal S1 and S2, no audible murmurs, no peripheral edema  GI: soft, NT/ND, no masses/HSM appreciated.  Neurology: no tremors.   Skin: no visible rashes/lesions  Psychiatric: AAO x 3, normal affect/mood.      ASSESSMENT/RECOMMENDATIONS:  Patient is a 84y old  Female who presents with a chief complaint of Chest Pain (2017 18:40) with hypocalcemia and low cortisol level.     Recommend:  Adrenal insufficiency:  Hydrocortisone 50 mg iv once  Hydrocortisone 25 mg iv in AM  Prednisone 40 mg po AM with food and taper  Hypocalcemia/gastric bypass:  Continue iv and po calcium  Hypokalemia/electrolytes:  Replace to K>4 and Magnesium > 2        Plan discussed with physician assistant. Management  of other medical issues per primary team.    Attending attestation:  I have seen and evaluated the patient at the bedside.   Nurse Practitioner/Fellow/Resident’s note reviewed, including vital signs, PE and laboratory results.  Direct personal management and extensive interpretation of the data conducted.   Assessment and recommendations as above. HPI:  History obtained from patient, daughter Pascale Basilio, prior history and hospital notes    83 y/o F DNR/DNI ([facility] form copy from Rehab in chart) Multiple allergies including ASA, Shellfish, ARB Allergy from [facility], w/ PMHX HTN, HLD, recent diagnosis of Takotsubo Cardiomyopathy/Systolic CHF EF 25-30% on Echo in 2016, CKD stage 4 w/ h/o Urinary Retention, Asthma/COPD s/p recent exacerbation in 2016, Temporal Arteritis complicated by L eye legal blindness, RA, GERD, Chronic Anemia s/p blood transfusion in 10/2017, 2017 and 2017, h/o hypocalcemia, SBO s/p bowel resection (10/2016) w/ h/o Short Gut Syndrome  initially lost 15-20 lb weight loss and then gained back 5lb since procedure, admitted to [facility] in -2016 w/ Asthma Exacerbation 2nd to +RSV, readmitted in 2016 w/ tachypnea and hypertensive urgency diagnosed w/ Asthma Exacerbation 2nd RSV, noted to have NSTEMI peak trop 5.9 medically managed w/ Plavix and Hep gtt (both d/c’d that admission due to acute on chronic anemia), treated w/ BB for SVT, irby was removed and pt passed trial of void for urinary retention and discharged to rehab 17, who presents from rehab to [facility] ER 17, today, due to 3/10 SS "heavy" intermittent non-radiating C/P lasting 5 minutes to 30 minutes. Pt BIBA to [facility], on arrival to [facility] due to C/P pt received SL NTG x 2 w/ relief. Pt also noted mild tremors per patient and daughter to b/l shoulder and UE 2nd to acute hypokalemia, acute hypocalcemia and acute hypomagnesemia. Daughter reports h/o LE edema improved.  Pt denies SOB, dizziness, diaphoresis, fatigue/drowsiness, palpitations, orthopnea, PND, syncope, muscle cramping, confusion paresthesias, tingling in the lips and fingers, irritability, constipation, seizures, insomnia, muscle weakness.     In ER /74, HR 68, RR 16, T 97.5, O2 100% on RA. Labs significant for K 2.9, repeat K 4.1 (moderate hemolysis), repeat K 2.9, Ca < 5.0 Albumin 2.6  (Ca was 6.3-8.0 in 2016), Mg 1.1, Troponin 0.223, CPK  383, BNP 12,000 (BNP was 2,900 in 2016) CXR reported as clear today per official radiology read, BUN/Cr. 47-52/2.3-2.4 in ER today (Cr. 1.61-2.08 in 2016), Na 146-147, repeat Na 145, H/H 10.5/35 (H/H noted to be 7.9/24 -10.5/32 in 2017, INR 1.4 (LFTs normal), EKG in ER showed SR @ 71bpm w/ PACs, new TWI in V2-V6, AVL, ST depression 1mm in V3, 0.5mm in V6, old TWI in AVL. Initial QTC on , repeat . Pt received Potassium Chloride 10meq IV and Magnesium 1g IV only at the time of me seeing the patient. Pt reported being C/P free and w/ much reduced b/l shoulder and UE tremors on assessment.  Pt admitted to [facility] for further management.   Echo 2016 showed: mild LVH, entire apical two thirds of LV are severely hypokinetic to akinetic, wall motion abnormalities are consistent with apical ballooning (Takasubu) syndrome vs. injury at the left anterior descending territory, EF 25-30%, mild AR, mod. TR, mild pulm HTN (PASP 45mmHg), mild elev. LA pressure, small pericardial effusion. (2017 18:40)      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: S/P partial hysterectomy      FAMILY HISTORY:  No pertinent family history in first degree relatives      SOCIAL HISTORY:  Smoking:  ETOH:  Drug use:    HOME MEDICATIONS:      MEDICATIONS  (STANDING):  predniSONE   Tablet 10milliGRAM(s) Oral daily  tamsulosin 0.4milliGRAM(s) Oral at bedtime  loratadine 10milliGRAM(s) Oral daily  atorvastatin 40milliGRAM(s) Oral at bedtime  carvedilol 6.25milliGRAM(s) Oral every 12 hours  fluticasone / salmeterol 250-50 MICROgram(s) Diskus 1Dose(s) Inhalation two times a day  zinc gluconate 100milliGRAM(s) Oral daily  pantoprazole    Tablet 40milliGRAM(s) Oral before breakfast  hydrALAZINE 50milliGRAM(s) Oral every 8 hours  calcium carbonate 1250 mG + Vitamin D (OsCal 500 + D) 1Tablet(s) Oral two times a day  multivitamin 1Tablet(s) Oral daily  cyanocobalamin 1000MICROGram(s) Oral daily  folic acid 1milliGRAM(s) Oral daily  thiamine 100milliGRAM(s) Oral daily  cholecalciferol 400Unit(s) Oral daily    MEDICATIONS  (PRN):  levalbuterol Inhalation 0.63milliGRAM(s) Inhalation every 4 hours PRN sob  acetaminophen   Tablet 650milliGRAM(s) Oral every 6 hours PRN Moderate Pain (4 - 6)      Allergies    angiotensin converting enzyme inhibitors (Angioedema)  aspirin (Unknown)  lactose (Diarrhea)  penicillin (Angioedema)  Seafood (Short breath)  shellfish (Short breath)  spinach (Unknown)    Intolerances        REVIEW OF SYSTEMS  CONSTITUTIONAL:  Negative fever or chills  EYES:  Negative visual field deficit, blurry vision or double vision  ENT:  Negative for sore throat, runny nose, or earache  CARDIOVASCULAR:  Negative for chest pain or palpitations  RESPIRATORY:  Negative for cough, wheezing, sputum production, or SOB   GASTROINTESTINAL:  Negative for nausea, vomiting, diarrhea, or abdominal pain  GENITOURINARY:  Negative frequency, urgency or dysuria  MUSCULOSKELETAL:  No joint pain or stiffness  INTEGUMENTARY: no rashes  NEUROLOGIC:  No headache, confusion, syncope or seizures  PSYCHIATRIC: No depression or anxiety  HEMATOLOGY AND ONCOLOGY:  No unusual infections or bleeding    CAPILLARY GLUCOSE:  CAPILLARY BLOOD GLUCOSE        LABS:                        9.0    6.5   )-----------( 168      ( 2017 06:20 )             26.8     2017 20:13    144    |  110    |  49     ----------------------------<  124    4.2     |  22     |  2.36     Ca    6.0        2017 20:13  Mg     2.6       2017 06:20    TPro  5.0    /  Alb  2.3    /  TBili  0.6    /  DBili  x      /  AST  19     /  ALT  18     /  AlkPhos  54     2017 06:20    PT/INR - ( 2017 12:20 )   PT: 15.7 sec;   INR: 1.41          PTT - ( 2017 12:20 )  PTT:36.4 sec  Urinalysis Basic - ( 2017 10:20 )    Color: Yellow / Appearance: Clear / S.020 / pH: x  Gluc: x / Ketone: NEGATIVE  / Bili: NEGATIVE / Urobili: 0.2 E.U./dL   Blood: x / Protein: 100 mg/dL / Nitrite: NEGATIVE   Leuk Esterase: Small / RBC: < 5 /HPF / WBC Many /HPF   Sq Epi: x / Non Sq Epi: Rare /HPF / Bacteria: Many /HPF        RADIOLOGY & ADDITIONAL STUDIES:      Physical Examination:  Vital Signs Last 24 Hrs  T(C): 36.7, Max: 37.5 ( @ 01:05)  T(F): 98, Max: 99.5 ( @ 01:05)  HR: 74 (64 - 74)  BP: 161/74 (134/76 - 174/69)  BP(mean): --  RR: 17 (16 - 17)  SpO2: 96% (96% - 99%)    Constitutional: Elderly female comfortable in hospital bed.   HEENT: NCAT, MMM, OP clear, EOMI, , no proptosis or lid retraction  Neck: no thyromegaly or palpable thyroid nodules   Respiratory: lungs CTAB.  Cardiovascular: regular rhythm, normal S1 and S2, no audible murmurs, no peripheral edema  GI: soft, NT/ND, no masses/HSM appreciated.  Neurology: no tremors.   Skin: no visible rashes/lesions  Psychiatric: Lethargic, depressed mood.      ASSESSMENT/RECOMMENDATIONS:  Patient is a 84y old  female who presents with a chief complaint of Chest Pain (2017 18:40) with hypocalcemia and low cortisol level.     Recommend:  Adrenal insufficiency:  Hydrocortisone 50 mg iv once  Hydrocortisone 25 mg iv in AM  Prednisone 10 mg po AM with food  Hypocalcemia/gastric bypass:  Continue iv and po calcium  Hypokalemia/electrolytes:  Replace to K>4 and Magnesium > 2        Plan discussed with physician assistant. Management  of other medical issues per primary team.    Attending attestation:  I have seen and evaluated the patient at the bedside.   Nurse Practitioner/Fellow/Resident’s note reviewed, including vital signs, PE and laboratory results.  Direct personal management and extensive interpretation of the data conducted.   Assessment and recommendations as above.

## 2017-01-25 NOTE — DIETITIAN INITIAL EVALUATION ADULT. - PROBLEM SELECTOR PLAN 6
Monitor for signs and symptoms of electrolyte disturbance: tremors, muscle cramping, confusion paresthesias, tingling in the lips and fingers, irritability, constipation, seizures, insomnia, muscle weakness    Neuro exam w/o any deficit other than known L eye blindness. Strength 5/5 in all extremities.     Improving, mild and intermittent to b/l UE and shoulders on last assessment. Noted in setting of electrolyte imbalance. Check TSH, T3, T4.    Monitor tele closely

## 2017-01-25 NOTE — DIETITIAN INITIAL EVALUATION ADULT. - OTHER INFO
pt with hx of CKD, CHF, SBO s/p Bowel Resection with Short Gut Syndrome is now admitted for low lytes and worsening renal function. Nursing Home [facility] reviewed: does not mention nutrition. pt is currently on DASH/Mech Soft diet and reports fair/poor PO intake @ this time- pt denies issues chewing/swallowing @ this time. pt reports UBW of 130 pounds pt feels she was this wt as of Last October. pt reports losing wt s/p SBO. per prior RD note pt was with wt of 150 pounds however also noted with edema @ that time (12/2016). pt is currently noted with wt of 105 pounds- this suggest wt loss of ~25 pounds based on stated UBW (19% body wt loss x 4 months) and wt loss of 45 pounds x 1 month (30% body wt loss). Wt loss which occurred over the last month may also be in part 2/2 fluid loss as pt was noted with edema 2+ edema (12/2016) and now is only noted with trace edema @ this time. pt reports allergy to seafood, shellfish, spinach and lactose intolerance- confirmed in EMR. High Nutrition Risk.

## 2017-01-25 NOTE — PHYSICAL THERAPY INITIAL EVALUATION ADULT - GAIT DEVIATIONS NOTED, PT EVAL
decreased mitchell/unsteady gait, difficulty navigating around obstacles in hallway, inconsistent step length, decreased heel-toe gait pattern/decreased step length

## 2017-01-25 NOTE — PROGRESS NOTE ADULT - PROBLEM SELECTOR PLAN 7
H/H 10.5/35, check Iron studies, B12/folate.  Cont. folic acid, thiamine, B12    H/o Short Gut Syndrome  Per daughter and pt, pt w/o recent constipation or diarrhea and no recent need for Loperamide or Colace.   DVT PPX: Intermittent Pneumatic Compression B/L LE as per Dr. Jones. If patient does not tolerate Heparin Sub Cut per Dr. Jones.    Code Status: DNR/DNI  DNR/DNI form in chart Dr. Jones to sign. Copy of [facility] form in chart. DNR/DNI order in sunrise.       Case discussed w/ Dr. Jones and Dr. Bryan Stable.  Cont. Xopenex, Advair, and Prednisone 10mg Daily (also on for Rheumatoid arthritis)

## 2017-01-25 NOTE — DIETITIAN INITIAL EVALUATION ADULT. - NS AS NUTRI INTERV MEALS SNACK
Recommend low sodium, lactose free diet. If pt begins to show s/s of dumping recommend to add CNSCHO + low fat to diet order. If PO intake improves recommend adding 60 gm prot restriction to diet order.

## 2017-01-25 NOTE — PHYSICAL THERAPY INITIAL EVALUATION ADULT - PERTINENT HX OF CURRENT PROBLEM, REHAB EVAL
Patient is an 84 year old female who presents from rehab to [facility] ER 1/24/17, today, due to 3/10 SS "heavy" intermittent non-radiating C/P lasting 5 minutes to 30 minutes. Pt BIBA to [facility], on arrival to [facility] due to C/P pt received SL NTG x 2 w/ relief.

## 2017-01-25 NOTE — CHART NOTE - NSCHARTNOTEFT_GEN_A_CORE
Upon Nutritional Assessment by the Registered Dietitian your patient was determined to meet criteria / has evidence of the following diagnosis/diagnoses:          [ ]  Mild Protein Calorie Malnutrition        [ x ]  Moderate Protein Calorie Malnutrition        [ ] Severe Protein Calorie Malnutrition        [ ] Unspecified Protein Calorie Malnutrition        [ ] Underweight / BMI <19        [ ] Morbid Obesity / BMI > 40      Findings as based on:  •  Comprehensive nutrition assessment and consultation    pt with wt loss, prolonged poor po intake     Treatment:  see IA  The following diet has been recommended: see IA      PROVIDER Section:     By signing this assessment you are acknowledging and agree with the diagnosis/diagnoses assigned by the Registered Dietitian    Comments:

## 2017-01-25 NOTE — PROGRESS NOTE ADULT - PROBLEM SELECTOR PLAN 3
Dr. Bryan following  Hypocalcemia: Ca < 5.0 on admit. Supplemented w/ 4gm Calcium gluconate w/ improvement to 5.6 (corrected 6.96) this AM.  s/p another 2gm calcium gluconate. f/u repeat BMP @ 4pm.  - QTC prolonged 495, continue to monitor EKGs w/ serial EKGs  - Cont. PO Vitamin D and Calcium.    - improved Magnesium 2.6 (1.1 on Admit, supplemented w/ 6gm total)  - improved Potassium 3.7 this AM supplemented w/ additional Kdur 20meq. Dr. Bryan following  Hypocalcemia: Ca < 5.0 on admit. Supplemented w/ 4gm Calcium gluconate w/ improvement to 5.6 (corrected 6.96) this AM.  s/p another 2gm calcium gluconate. f/u repeat BMP @ 8pm. Goal Calcium >6  - QTC prolonged 495, continue to monitor EKGs w/ serial EKGs  - Cont. PO Vitamin D and Calcium.    - improved Magnesium 2.6 (1.1 on Admit, supplemented w/ 6gm total)  - improved Potassium 3.7 this AM supplemented w/ additional Kdur 20meq.  - Continue to monitor for signs of electrolyte disturbance, Mild Tremor improved.

## 2017-01-25 NOTE — DIETITIAN INITIAL EVALUATION ADULT. - PROBLEM SELECTOR PLAN 1
C/P relieved w/ NTG. H/o CAD. F/u Troponin at 6PM and 11PM. f/u Echo.   No ASA due to ASA allergy. No Plavix as per Dr. Jones due to h/o Anemia. Cont. Coreg,  Lipitor 40mg

## 2017-01-25 NOTE — PROGRESS NOTE ADULT - PROBLEM SELECTOR PLAN 5
Cr. 2.3-2.4 in ER today (Cr. 1.61-2.08 in 12/2016). F/u UA, Urine Electrolytes, Urine Cx, Renal US and Bladder Scan for post void residual. Lasix on hold as discussed w/ Dr. Bryan. Monitor I/Os and volume status.  On Flomax (h/o urinary retention) Endocrine consulted, Dr. Hollins  - Cortisol Level 1.8,  Possible adrenal insufficiency  - Received Hydrocortisone 50mg IV x1 tonight,  Ordered for Hydrocortisone 25mg x1 in AM  - f/u Further Recs, and dosage of hydrocortisone, will likely Oral Meds on discharge.  - TSH and T4 wnl

## 2017-01-25 NOTE — PROGRESS NOTE ADULT - PROBLEM SELECTOR PLAN 2
Hx of Takotsubo Cardiomyopathy (EF 25-30% by Echo 12/16), CXR: lungs clear.  - Echo 1/24: mild concentric LVH. apical mild HK 55%, impaired left ventricular relaxation with mildly elevated left atrial pressure (8-14mmHg), Moderate AS (FERDINAND 1.2 cm2), The peak pressure gradient is 27mmHg, mean pressure gradient is 16 mmHg, mild to moderate AR, moderate pericardial effusion noted.  - Improved EF from Prior.  No ACEI/ARB due to allergy  - Remains Euvolemic, Will continue to hold Lasix in setting of ANTONIA and metabolic disorder.

## 2017-01-25 NOTE — PROGRESS NOTE ADULT - PROBLEM SELECTOR PLAN 1
CP free, CE trending down. Recent NSTEMI 12/2106 treated medically.  No ASA due to ASA allergy. No Plavix due to h/o Anemia.  - continue Coreg and Lipitor 40mg

## 2017-01-25 NOTE — DIETITIAN INITIAL EVALUATION ADULT. - FACTORS AFF FOOD INTAKE
no reported issues chewing/swallowing or GI distress reported @ this time. pt reports she had one episode of diarrhea a few weeks ago- pt reports episode lasted for a few days and was 2/2 to taking stool softer which she did not need. pt feels this was an isolated incident and not a chronic issue.

## 2017-01-25 NOTE — PROGRESS NOTE ADULT - ASSESSMENT
83 y/o F DNR/DNI ([facility] form copy from Rehab in chart) ASA, Shellfish, ARB Allergy from [facility], w/ PMHX HTN, HLD, recent diagnosis of Takotsubo Cardiomyopathy /Systolic CHF EF 25-30% 12/2016, CKD stage 4 w/ h/o Urinary Retention, Asthma/COPD s/p recent exacerbation, Temporal Arteritis complicated by L eye legal blindness, SBO s/p bowel resection, RA, GERD, Chronic Anemia s/p prior blood transfusion, h/o hypocalcemia, h/o Asthma Exacerbation 2nd to +RSV in 12/2016, NSTEMI 12/2016 who presented w/ C/P and acute hypokalemia, acute hypocalcemia and acute hypomagnesemia, worsening renal function

## 2017-01-25 NOTE — PROGRESS NOTE ADULT - ASSESSMENT
·	ANTONIA and hypernatremia consistent with hypovolemia. Stage 4 CKD.   ·	Hypocalcemia.  ·	Hypokalemia.    Suggest:    1. Keep K>4 Mg>2. Replace K IV/PO.   2. Calcium gluconate 2 amps today (given). Continue oral calcium/vitamin D replacement.  3. Follow SCr.  4. Follow up with me as outpatient.      Please call with any questions.    Hector Bryan MD, FACP, FASN | kidney.[facility]  Nephrology, Hypertension, and Internal Medicine  Mobile: [phone] (Daytime Hours Only)  Office/Answering Service: [phone]  Asst. Prof. of Medicine, [facility] - Nephrology at [address]  [address], [address], [place], [place]

## 2017-01-25 NOTE — DIETITIAN INITIAL EVALUATION ADULT. - NS AS NUTRI INTERV ED CONTENT
Purpose of the nutrition education/Discussed ONS. Encouraged pt to order meals with host. Encouraged PO intake during meals & reviewed importance. Discussed Vitamins. Understadning stated, provide additional motivation as needed.

## 2017-01-25 NOTE — PROGRESS NOTE ADULT - SUBJECTIVE AND OBJECTIVE BOX
INTERVAL HISTORY:    * BP labile/uncontrolled. * Hypocalcemia improved. * Hypokalemia improved. * CKD stable.     ROS: No chest pain. No shortness of breath. No nausea.    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: S/P partial hysterectomy      PHYSICAL EXAM:  T(C): 37.1, Max: 37.6 ( @ 21:13)  HR: 74  BP: 161/74 (134/76 - 177/76)  RR: 17  SpO2: 96%  Wt(kg): --    I & Os for 24h ending 2017 07:00  =============================================  IN:    Oral Fluid: 360 ml    Solution: 100 ml    Solution: 100 ml    Total IN: 560 ml  ---------------------------------------------  OUT:    Total OUT: 0 ml  ---------------------------------------------  Total NET: 560 ml    I & Os for current day (as of 2017 18:04)  =============================================  IN:    Oral Fluid: 700 ml    Solution: 100 ml    Total IN: 800 ml  ---------------------------------------------  OUT:    Voided: 550 ml    Total OUT: 550 ml  ---------------------------------------------  Total NET: 250 ml    Weight 48 ( @ 19:55)    Appearance: alert, pleasant, INAD.  ENT: oral mucosa moist, no pallor/cyanosis.  Neck: no JVD visible.  Cardiac: no rubs. no murmurs. Extremities: TRACE BILATERAL ANKLES.  Skin: no rashes.  Extremities (digits): no clubbing or cyanosis.  Respratory effort: no access muscle use. Lungs: CLEAR TO AUSCULTATION.  Abdomen: soft. nontender. no masses.  Psych affect: not depressed.     MEDICATIONS  (STANDING):  predniSONE   Tablet 10milliGRAM(s) Oral daily  tamsulosin 0.4milliGRAM(s) Oral at bedtime  loratadine 10milliGRAM(s) Oral daily  atorvastatin 40milliGRAM(s) Oral at bedtime  carvedilol 6.25milliGRAM(s) Oral every 12 hours  fluticasone / salmeterol 250-50 MICROgram(s) Diskus 1Dose(s) Inhalation two times a day  zinc gluconate 100milliGRAM(s) Oral daily  pantoprazole    Tablet 40milliGRAM(s) Oral before breakfast  hydrALAZINE 50milliGRAM(s) Oral every 8 hours  calcium carbonate 1250 mG + Vitamin D (OsCal 500 + D) 1Tablet(s) Oral two times a day  multivitamin 1Tablet(s) Oral daily  cyanocobalamin 1000MICROGram(s) Oral daily  folic acid 1milliGRAM(s) Oral daily  thiamine 100milliGRAM(s) Oral daily  cholecalciferol 400Unit(s) Oral daily    MEDICATIONS  (PRN):  levalbuterol Inhalation 0.63milliGRAM(s) Inhalation every 4 hours PRN sob  acetaminophen   Tablet 650milliGRAM(s) Oral every 6 hours PRN Moderate Pain (4 - 6)      DATA:  145    |  111<H>  |  48<H>  ----------------------------<  63<L>  Ca:5.6<LL> (2017 06:20)  3.7     |  19<L>  |  2.26<H>      eGFR if Non : 19 <L>  eGFR if : 22 <L>    TPro  5.0 g/dL<L>  /  Alb  2.3 g/dL<L>  /  TBili  0.6 mg/dL  /  DBili  x      /  AST  19 U/L  /  ALT  18 U/L  /  AlkPhos  54 U/L  2017 06:20                        9.0<L>  6.5   )-----------( 168      ( 2017 06:20 )             26.8<L>    Phos:-- Mg:-- PTH:131 pg/mL<H> Uric acid:-- Serum Osm:--  Ferritin:-- Iron:-- TIBC:-- Tsat:--  B12:-- TSH:-- ( @ 15:39)    Urinalysis Basic - ( 2017 10:20 )  Color: Yellow / Appearance: Clear / S.020 / pH: x  Gluc: x / Ketone: NEGATIVE  / Bili: NEGATIVE / Urobili: 0.2 E.U./dL   Blood: x / Protein: 100 mg/dL<!!> / Nitrite: NEGATIVE   Leuk Esterase: Small<!!> / RBC: < 5 /HPF / WBC Many /HPF<!!>   Sq Epi: x / Non Sq Epi: Rare /HPF / Bacteria: Many /HPF<!!>      UProt:-- UCr:38.6 mg/dL P/C Ratio:-- 24 hour Prot:-- UVol:-- CrCl:--  Alida:95 mmoL/L UOsm:338 mosmol/kg UVol:-- UCl:-- UK:17 mmoL/L ( @ 10:20)

## 2017-01-25 NOTE — DIETITIAN INITIAL EVALUATION ADULT. - ENERGY NEEDS
Height: 5 feet 2 inches, Weight (Current): 105 pounds,  pounds +/-10%, %IBW 95%, BMI 19.4    current body wt used for energy calculations as pt falls within % IBW  EER based on malnutrition in older adult with CKD. Fluids per team.

## 2017-01-25 NOTE — DIETITIAN INITIAL EVALUATION ADULT. - PROBLEM SELECTOR PLAN 2
No ACEI/ARB due to ACEI allergy and worsening CKD. Hold Lasix due to acute hypokalemia, acute hypocalcemia and acute hypomagnesemia, worsening renal function per Randi De Luna (Renal consult).  CXR showed clear lungs. BNP elevated likely in the setting of Cr. 2.4. Mild crackles R base. O2 sat. 100% on RA. Cont. to monitor. Closely monitor fluid status, electrolytes and assess resuming Lasix tomorrow    HTN  BP stable. Cont. Coreg, Hydralazine. Lasix on hold

## 2017-01-25 NOTE — PROGRESS NOTE ADULT - PROBLEM SELECTOR PLAN 6
Asthma  Cont. Xopenex, Advair, on Prednisone 10mg daily for RA (on Protonix for GI protection)    RA  Cont. Prednisone 10mg daily Chronic Anemia w/ Prior Blood Transfusion (baseline H/H 8-9 from prior admissions)  - remains stable H/H 9/26.8 H/H 10.5/35  - Continue to monitor  - Iron 15, TIBC 146, Iron Sat 16, Ferritn 664

## 2017-01-26 DIAGNOSIS — I10 ESSENTIAL (PRIMARY) HYPERTENSION: ICD-10-CM

## 2017-01-26 LAB
24R-OH-CALCIDIOL SERPL-MCNC: 14.8 NG/ML — LOW (ref 30–100)
ALBUMIN SERPL ELPH-MCNC: 2.2 G/DL — LOW (ref 3.4–5)
ALP SERPL-CCNC: 56 U/L — SIGNIFICANT CHANGE UP (ref 40–120)
ALT FLD-CCNC: 17 U/L — SIGNIFICANT CHANGE UP (ref 12–42)
ANION GAP SERPL CALC-SCNC: 11 MMOL/L — SIGNIFICANT CHANGE UP (ref 9–16)
AST SERPL-CCNC: 13 U/L — LOW (ref 15–37)
BILIRUB SERPL-MCNC: 0.9 MG/DL — SIGNIFICANT CHANGE UP (ref 0.2–1.2)
BUN SERPL-MCNC: 48 MG/DL — HIGH (ref 7–23)
CALCIUM SERPL-MCNC: 6 MG/DL — CRITICAL LOW (ref 8.5–10.5)
CHLORIDE SERPL-SCNC: 110 MMOL/L — HIGH (ref 96–108)
CK SERPL-CCNC: 222 U/L — HIGH (ref 26–192)
CO2 SERPL-SCNC: 21 MMOL/L — LOW (ref 22–31)
CORTIS AM PEAK SERPL-MCNC: 13.1 UG/DL — SIGNIFICANT CHANGE UP (ref 3.9–37.5)
CREAT SERPL-MCNC: 2.28 MG/DL — HIGH (ref 0.5–1.3)
GLUCOSE SERPL-MCNC: 102 MG/DL — HIGH (ref 70–99)
HCT VFR BLD CALC: 27.2 % — LOW (ref 34.5–45)
HGB BLD-MCNC: 9 G/DL — LOW (ref 11.5–15.5)
MAGNESIUM SERPL-MCNC: 2 MG/DL — SIGNIFICANT CHANGE UP (ref 1.6–2.4)
MCHC RBC-ENTMCNC: 24.9 PG — LOW (ref 27–34)
MCHC RBC-ENTMCNC: 33.1 G/DL — SIGNIFICANT CHANGE UP (ref 32–36)
MCV RBC AUTO: 75.1 FL — LOW (ref 80–100)
PLATELET # BLD AUTO: 155 K/UL — SIGNIFICANT CHANGE UP (ref 150–400)
POTASSIUM SERPL-MCNC: 4.3 MMOL/L — SIGNIFICANT CHANGE UP (ref 3.5–5.3)
POTASSIUM SERPL-SCNC: 4.3 MMOL/L — SIGNIFICANT CHANGE UP (ref 3.5–5.3)
PROT SERPL-MCNC: 5.3 G/DL — LOW (ref 6.4–8.2)
RBC # BLD: 3.62 M/UL — LOW (ref 3.8–5.2)
RBC # FLD: 18.9 % — HIGH (ref 10.3–16.9)
SODIUM SERPL-SCNC: 142 MMOL/L — SIGNIFICANT CHANGE UP (ref 135–145)
TROPONIN I SERPL-MCNC: 0.07 NG/ML — HIGH (ref 0.01–0.04)
WBC # BLD: 4.5 K/UL — SIGNIFICANT CHANGE UP (ref 3.8–10.5)
WBC # FLD AUTO: 4.5 K/UL — SIGNIFICANT CHANGE UP (ref 3.8–10.5)

## 2017-01-26 PROCEDURE — 99221 1ST HOSP IP/OBS SF/LOW 40: CPT

## 2017-01-26 PROCEDURE — 99233 SBSQ HOSP IP/OBS HIGH 50: CPT

## 2017-01-26 RX ORDER — DOCUSATE SODIUM 100 MG
100 CAPSULE ORAL
Qty: 0 | Refills: 0 | Status: DISCONTINUED | OUTPATIENT
Start: 2017-01-26 | End: 2017-01-26

## 2017-01-26 RX ORDER — FERROUS SULFATE 325(65) MG
325 TABLET ORAL
Qty: 0 | Refills: 0 | Status: DISCONTINUED | OUTPATIENT
Start: 2017-01-26 | End: 2017-01-27

## 2017-01-26 RX ORDER — PREGABALIN 225 MG/1
500 CAPSULE ORAL DAILY
Qty: 0 | Refills: 0 | Status: DISCONTINUED | OUTPATIENT
Start: 2017-01-27 | End: 2017-01-27

## 2017-01-26 RX ORDER — DOCUSATE SODIUM 100 MG
100 CAPSULE ORAL
Qty: 0 | Refills: 0 | Status: DISCONTINUED | OUTPATIENT
Start: 2017-01-26 | End: 2017-01-27

## 2017-01-26 RX ADMIN — Medication 50 MILLIGRAM(S): at 06:27

## 2017-01-26 RX ADMIN — Medication 1 MILLIGRAM(S): at 11:09

## 2017-01-26 RX ADMIN — FLUTICASONE PROPIONATE AND SALMETEROL 1 DOSE(S): 50; 250 POWDER ORAL; RESPIRATORY (INHALATION) at 22:19

## 2017-01-26 RX ADMIN — Medication 50 MILLIGRAM(S): at 14:27

## 2017-01-26 RX ADMIN — Medication 100 MILLIGRAM(S): at 11:08

## 2017-01-26 RX ADMIN — Medication 325 MILLIGRAM(S): at 18:14

## 2017-01-26 RX ADMIN — PREGABALIN 1000 MICROGRAM(S): 225 CAPSULE ORAL at 11:09

## 2017-01-26 RX ADMIN — Medication 400 UNIT(S): at 11:09

## 2017-01-26 RX ADMIN — Medication 50 MILLIGRAM(S): at 22:19

## 2017-01-26 RX ADMIN — TAMSULOSIN HYDROCHLORIDE 0.4 MILLIGRAM(S): 0.4 CAPSULE ORAL at 22:19

## 2017-01-26 RX ADMIN — ATORVASTATIN CALCIUM 40 MILLIGRAM(S): 80 TABLET, FILM COATED ORAL at 22:19

## 2017-01-26 RX ADMIN — CARVEDILOL PHOSPHATE 6.25 MILLIGRAM(S): 80 CAPSULE, EXTENDED RELEASE ORAL at 06:28

## 2017-01-26 RX ADMIN — Medication 1 TABLET(S): at 18:14

## 2017-01-26 RX ADMIN — LORATADINE 10 MILLIGRAM(S): 10 TABLET ORAL at 11:08

## 2017-01-26 RX ADMIN — Medication 1 TABLET(S): at 11:09

## 2017-01-26 RX ADMIN — Medication 1 TABLET(S): at 07:31

## 2017-01-26 RX ADMIN — Medication 10 MILLIGRAM(S): at 06:27

## 2017-01-26 RX ADMIN — Medication 100 MILLIGRAM(S): at 11:09

## 2017-01-26 RX ADMIN — PANTOPRAZOLE SODIUM 40 MILLIGRAM(S): 20 TABLET, DELAYED RELEASE ORAL at 06:27

## 2017-01-26 RX ADMIN — Medication 25 MILLIGRAM(S): at 07:32

## 2017-01-26 RX ADMIN — CARVEDILOL PHOSPHATE 6.25 MILLIGRAM(S): 80 CAPSULE, EXTENDED RELEASE ORAL at 18:14

## 2017-01-26 RX ADMIN — Medication 100 MILLIGRAM(S): at 18:14

## 2017-01-26 RX ADMIN — FLUTICASONE PROPIONATE AND SALMETEROL 1 DOSE(S): 50; 250 POWDER ORAL; RESPIRATORY (INHALATION) at 11:11

## 2017-01-26 NOTE — CONSULT NOTE ADULT - PROBLEM SELECTOR RECOMMENDATION 9
f/u UCx  f/u PVR  no acute  intervention needed at this time f/u UCx  14fr irby was inserted, bladder was irrigated with NS, urine cloudy, no pus or hematuria. Irby cath was removed  no acute  intervention needed at this time

## 2017-01-26 NOTE — PROGRESS NOTE ADULT - SUBJECTIVE AND OBJECTIVE BOX
INTERVAL HPI/OVERNIGHT EVENTS:      Patient is a 84y old female who presents with a chief complaint of Chest Pain (17) was seen and examined today. Reports the following symptoms:    CONSTITUTIONAL:  Negative fever or chills, feels better, low appetite  EYES:  Negative  blurry vision or double vision  CARDIOVASCULAR:  Negative for chest pain or palpitations  RESPIRATORY:  Negative for cough, wheezing, or SOB   GASTROINTESTINAL:  Negative for nausea, vomiting, diarrhea, constipation, or abdominal pain  GENITOURINARY:  Negative frequency, urgency or dysuria  NEUROLOGIC:  No headache, confusion, dizziness, lightheadedness    MEDICATIONS  (STANDING):  predniSONE   Tablet 10milliGRAM(s) Oral daily  tamsulosin 0.4milliGRAM(s) Oral at bedtime  loratadine 10milliGRAM(s) Oral daily  atorvastatin 40milliGRAM(s) Oral at bedtime  carvedilol 6.25milliGRAM(s) Oral every 12 hours  fluticasone / salmeterol 250-50 MICROgram(s) Diskus 1Dose(s) Inhalation two times a day  zinc gluconate 100milliGRAM(s) Oral daily  pantoprazole    Tablet 40milliGRAM(s) Oral before breakfast  hydrALAZINE 50milliGRAM(s) Oral every 8 hours  calcium carbonate 1250 mG + Vitamin D (OsCal 500 + D) 1Tablet(s) Oral two times a day  multivitamin 1Tablet(s) Oral daily  folic acid 1milliGRAM(s) Oral daily  thiamine 100milliGRAM(s) Oral daily  cholecalciferol 400Unit(s) Oral daily  ferrous    sulfate 325milliGRAM(s) Oral two times a day with meals    MEDICATIONS  (PRN):  levalbuterol Inhalation 0.63milliGRAM(s) Inhalation every 4 hours PRN sob  acetaminophen   Tablet 650milliGRAM(s) Oral every 6 hours PRN Moderate Pain (4 - 6)  docusate sodium 100milliGRAM(s) Oral two times a day PRN Constipation        LABS:                        9.0    4.5   )-----------( 155      ( 2017 06:19 )             27.2     2017 06:19    142    |  110    |  48     ----------------------------<  102    4.3     |  21     |  2.28     Ca    6.0        2017 06:19  Mg     2.0       2017 06:19    TPro  5.3    /  Alb  2.2    /  TBili  0.9    /  DBili  x      /  AST  13     /  ALT  17     /  AlkPhos  56     2017 06:19        Urinalysis Basic - ( 2017 10:20 )    Color: Yellow / Appearance: Clear / S.020 / pH: x  Gluc: x / Ketone: NEGATIVE  / Bili: NEGATIVE / Urobili: 0.2 E.U./dL   Blood: x / Protein: 100 mg/dL / Nitrite: NEGATIVE   Leuk Esterase: Small / RBC: < 5 /HPF / WBC Many /HPF   Sq Epi: x / Non Sq Epi: Rare /HPF / Bacteria: Many /HPF      Thyroid Stimulating Hormone, Serum: 1.606 uIU/mL ( @ 06:20)  Thyroid Stimulating Hormone, Serum: 1.540 uIU/mL ( @ 06:46)      RADIOLOGY & ADDITIONAL TESTS:      Vital Signs Last 24 Hrs  T(C): 36.4, Max: 36.4 ( @ 12:44)  T(F): 97.5, Max: 97.6 ( @ 12:44)  HR: 68 (55 - 69)  BP: 168/70 (138/65 - 168/70)  BP(mean): --  RR: 16 (16 - 17)  SpO2: 100% (96% - 100%)    CAPILLARY BLOOD GLUCOSE      PHYSICAL EXAM:  Constitutional: wn/wd in NAD.   HEENT: NCAT, MMM, OP clear, EOMI, , no proptosis or lid retraction  Neck: supple, no acanthosis, no thyromegaly or palpable thyroid nodules   Respiratory: Lungs CTAB.  Cardiovascular: regular rhythm, normal S1 and S2, no audible murmurs, no peripheral edema  GI: soft, + bowel sounds, NT/ND, no masses/HSM appreciated.  Neurology: no tremors, DTR 2+  Skin: no visible rashes/lesions  Psychiatric: AAO x 3, depressed affect/mood.        A/P: 84yFemale admitted for chest pain, hypocalcemia.  Consulted and being followed for adrenal insufficiency with low AM cortisol.     Adrenal insufficiency managed with bolus hydrocortisone iv x 2  Prednisone 10 mg po qAM with food to start in AM   Monitor blood pressure, blood sugar and replenish electrolytes prn  Hypocalcemia management per Renal for po calcium/vitamin D dosing and Ca>7  Case discussed with attending and primary team  Management of other medical issues per primary team.      Attending attestation:  I have seen and evaluated the patient at the bedside.   Nurse Practitioner/Fellow/Resident’s note reviewed, including vital signs, PE and laboratory results.  Direct personal management and extensive interpretation of the data conducted.   Assessment and recommendations as above.

## 2017-01-26 NOTE — CONSULT NOTE ADULT - ATTENDING COMMENTS
85 yo female with multiple medical problems is admitted with hypokalemia. Had renal bladder US showing some layering within the bladder. Bladder was irrigated, no purulence or blood found. No mass identified on US. F/u urine culture. Can repeat renal bladder US as outpatient.

## 2017-01-26 NOTE — PROGRESS NOTE ADULT - SUBJECTIVE AND OBJECTIVE BOX
CC: NSTEMI    INTERVAL HISTORY:    * Stable CKD. * HTN controlled. * Stable anemia.     ROS: No chest pain. No shortness of breath. No nausea.    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: S/P partial hysterectomy      PHYSICAL EXAM:  T(C): 36.4, Max: 37.1 ( @ 17:06)  HR: 69  BP: 138/65 (138/65 - 161/74)  RR: 16  SpO2: 98%  Wt(kg): --    I & Os for 24h ending 2017 07:00  =============================================  IN:    Oral Fluid: 700 ml    Solution: 100 ml    Total IN: 800 ml  ---------------------------------------------  OUT:    Voided: 550 ml    Total OUT: 550 ml  ---------------------------------------------  Total NET: 250 ml    I & Os for current day (as of 2017 13:42)  =============================================  IN:    Oral Fluid: 480 ml    Total IN: 480 ml  ---------------------------------------------  OUT:    Total OUT: 0 ml  ---------------------------------------------  Total NET: 480 ml    Weight 48 ( @ 19:55)    Appearance: alert, pleasant, INAD.  ENT: oral mucosa moist, no pallor/cyanosis.  Neck: no JVD visible.  Cardiac: no rubs. no murmurs. Extremities: TRACE BILATERAL EDEMA.   Skin: no rashes.  Extremities (digits): no clubbing or cyanosis.  Respratory effort: no access muscle use. Lungs: CLEAR TO AUSCULTATION.  Abdomen: soft. nontender. no masses.  Psych affect: not depressed. Orientation: person, place, situation.     MEDICATIONS  (STANDING):  predniSONE   Tablet 10milliGRAM(s) Oral daily  tamsulosin 0.4milliGRAM(s) Oral at bedtime  loratadine 10milliGRAM(s) Oral daily  atorvastatin 40milliGRAM(s) Oral at bedtime  carvedilol 6.25milliGRAM(s) Oral every 12 hours  fluticasone / salmeterol 250-50 MICROgram(s) Diskus 1Dose(s) Inhalation two times a day  zinc gluconate 100milliGRAM(s) Oral daily  pantoprazole    Tablet 40milliGRAM(s) Oral before breakfast  hydrALAZINE 50milliGRAM(s) Oral every 8 hours  calcium carbonate 1250 mG + Vitamin D (OsCal 500 + D) 1Tablet(s) Oral two times a day  multivitamin 1Tablet(s) Oral daily  cyanocobalamin 1000MICROGram(s) Oral daily  folic acid 1milliGRAM(s) Oral daily  thiamine 100milliGRAM(s) Oral daily  cholecalciferol 400Unit(s) Oral daily  ferrous    sulfate 325milliGRAM(s) Oral two times a day with meals  docusate sodium 100milliGRAM(s) Oral two times a day    MEDICATIONS  (PRN):  levalbuterol Inhalation 0.63milliGRAM(s) Inhalation every 4 hours PRN sob  acetaminophen   Tablet 650milliGRAM(s) Oral every 6 hours PRN Moderate Pain (4 - 6)      DATA:  142    |  110<H>  |  48<H>  ----------------------------<  102<H>  Ca:6.0<LL> (2017 06:19)  4.3     |  21<L>  |  2.28<H>      eGFR if Non : 19 <L>  eGFR if : 22 <L>    TPro  5.3 g/dL<L>  /  Alb  2.2 g/dL<L>  /  TBili  0.9 mg/dL  /  DBili  x      /  AST  13 U/L<L>  /  ALT  17 U/L  /  AlkPhos  56 U/L  2017 06:19                        9.0<L>  4.5   )-----------( 155      ( 2017 06:19 )             27.2<L>    Phos:-- M.0 mg/dL PTH:-- Uric acid:-- Serum Osm:--  Ferritin:-- Iron:-- TIBC:-- Tsat:--  B12:-- TSH:-- ( @ 06:19)    Urinalysis Basic - ( 2017 10:20 )  Color: Yellow / Appearance: Clear / S.020 / pH: x  Gluc: x / Ketone: NEGATIVE  / Bili: NEGATIVE / Urobili: 0.2 E.U./dL   Blood: x / Protein: 100 mg/dL<!!> / Nitrite: NEGATIVE   Leuk Esterase: Small<!!> / RBC: < 5 /HPF / WBC Many /HPF<!!>   Sq Epi: x / Non Sq Epi: Rare /HPF / Bacteria: Many /HPF<!!>      UProt:-- UCr:38.6 mg/dL P/C Ratio:-- 24 hour Prot:-- UVol:-- CrCl:--  Alida:95 mmoL/L UOsm:338 mosmol/kg UVol:-- UCl:-- UK:17 mmoL/L ( @ 10:20)

## 2017-01-26 NOTE — PROGRESS NOTE ADULT - PROBLEM SELECTOR PLAN 8
Cont. Xopenex, Advair, on Prednisone 10mg daily for RA (on Protonix for GI protection)    RA  Cont. Prednisone 10mg daily.
H/O Short Gut Syndrome s/p SBO w/ resection.  - Nutrition consulted. - prolonged poor intake w/ 25Ib weight loss in 4mo.  - Recommend low sodium, lactose free diet. If pt begins to show s/s of dumping recommend to add CNSCHO + low fat to diet order.   - f/u vitamin Levels

## 2017-01-26 NOTE — PROGRESS NOTE ADULT - ASSESSMENT
·	ANTONIA and hypernatremia consistent with hypovolemia, now stable.e 4 CKD.   ·	Hypocalcemia improved.  ·	Hypokalemia improved.    Suggest:    1. Keep K>4 Mg>2. Replace K IV/PO.   2. Continue oral calcium/vitamin D replacement.  3. Follow SCr.  4. Follow up with me as outpatient.      Please call with any questions.    Hector Bryan MD, FACP, FASN | kidney.[facility]  Nephrology, Hypertension, and Internal Medicine  Mobile: [phone] (Daytime Hours Only)  Office/Answering Service: [phone]  Asst. Prof. of Medicine, [facility] - Nephrology at [address]  [address], [address], [place], [place]

## 2017-01-26 NOTE — PROGRESS NOTE ADULT - PROBLEM SELECTOR PLAN 6
Resolved after electrolyte correction. Monitor for signs and symptoms of electrolyte disturbance: tremors, muscle cramping, confusion paresthesias, tingling in the lips and fingers, irritability, constipation, seizures, insomnia, muscle weakness    Neuro exam w/o any deficit other than known L eye blindness. Strength 5/5 in all extremities

## 2017-01-26 NOTE — CONSULT NOTE ADULT - ASSESSMENT
·	ANTONIA and hypernatremia consistent with hypovolemia.  ·	Hypocalcemia.  ·	Hypokalemia.    Suggest:    1. Keep K>4 Mg>2. Replace K IV/PO. IV magnesium sulfate 2 grams.  2. Check ionized calcium. Calcium gluconate 2 amps given. May require calcium gtt. Continue oral calcium/vitamin D.  3. Gentle IVF (1/2 NS), increase po intake.  4. Follow SCr.     Please call with any questions.    Hector Bryan MD, FACP, FASN | kidney.[facility]  Nephrology, Hypertension, and Internal Medicine  Mobile: [phone] (Daytime Hours Only)  Office/Answering Service: [phone]  Asst. Prof. of Medicine, [facility] [employer]  [facility] - Nephrology at [address]  [address], [address], [place], [place]
h/o CKD (baseline cr 2), urinary retention, ?abnormal US finding ?mobile material in the bladder pus vs infected urine vs hematuria  UA yellow urine, no nit, +leuks

## 2017-01-26 NOTE — PROGRESS NOTE ADULT - PROBLEM SELECTOR PLAN 5
Dr. Hollins consulted   Possible adrenal insufficiency. AM Cortisol 13.1   Received Hydrocortisone 50mg IV x1on 1/25/17 and IV Hydrocortisone 25mg x1 in AM on 1/26/17  Cont. Prednisone 10mg tomorrow as per Dr. Hollins  T3 low, TSH and T4 normal

## 2017-01-26 NOTE — PROGRESS NOTE ADULT - SUBJECTIVE AND OBJECTIVE BOX
S: Pt seen and examined bedside.  Patient denies C/P, SOB, N/V, dizziness, palpitations, diaphoresis, tremors  Pt denies fever/chills, dysuria, abdominal pain, diarrhea, and cough    O: Vital Signs Last 24 Hrs  T(C): 36.4, Max: 36.7 ( @ 21:25)  T(F): 97.6, Max: 98 ( @ 21:25)  HR: 55 (55 - 69)  BP: 138/82 (138/65 - 159/65)  BP(mean): --  RR: 16 (16 - 17)  SpO2: 96% (96% - 98%)    PHYSICAL EXAM:  GEN: NAD  PULM:  CTA B/L  CARD: No JVD B/L, RRR, S1 and S2   ABD: +BS, NT, soft/ND	  EXT: No Edema B/L LE  NEURO: A+Ox3, no focal deficit      LABS:                        9.0    4.5   )-----------( 155      ( 2017 06:19 )             27.2     2017 06:19    142    |  110    |  48     ----------------------------<  102    4.3     |  21     |  2.28     Ca    6.0        2017 06:19  Mg     2.0       2017 06:19    TPro  5.3    /  Alb  2.2    /  TBili  0.9    /  DBili  x      /  AST  13     /  ALT  17     /  AlkPhos  56     2017 06:19      I & Os for 24h ending  @ 07:00  =============================================  IN: 800 ml / OUT: 550 ml / NET: 250 ml    I & Os for current day (as of  @ 19:41)  =============================================  IN: 480 ml / OUT: 0 ml / NET: 480 ml    Daily Weight in k.5 (2017 06:31) S: Pt seen and examined bedside.  Patient denies C/P, SOB, N/V, dizziness, palpitations, diaphoresis, tremors  Pt denies fever/chills, dysuria, abdominal pain, diarrhea, and cough    O: Vital Signs Last 24 Hrs  T(C): 36.4, Max: 36.7 ( @ 21:25)  T(F): 97.6, Max: 98 ( @ 21:25)  HR: 55 (55 - 69)  BP: 138/82 (138/65 - 159/65)  BP(mean): --  RR: 16 (16 - 17)  SpO2: 96% (96% - 98%)    PHYSICAL EXAM:  GEN: NAD  PULM:  CTA B/L  CARD: No JVD B/L, RRR, 4/6 LACHELLE  ABD: +BS, NT, soft/ND	  EXT: No Edema B/L LE  NEURO: A+Ox3, no new focal deficit      LABS:                        9.0    4.5   )-----------( 155      ( 2017 06:19 )             27.2     2017 06:19    142    |  110    |  48     ----------------------------<  102    4.3     |  21     |  2.28     Ca    6.0        2017 06:19  Mg     2.0       2017 06:19    TPro  5.3    /  Alb  2.2    /  TBili  0.9    /  DBili  x      /  AST  13     /  ALT  17     /  AlkPhos  56     2017 06:19      I & Os for 24h ending  @ 07:00  =============================================  IN: 800 ml / OUT: 550 ml / NET: 250 ml    I & Os for current day (as of  @ 19:41)  =============================================  IN: 480 ml / OUT: 0 ml / NET: 480 ml    Daily Weight in k.5 (2017 06:31)

## 2017-01-26 NOTE — CONSULT NOTE ADULT - SUBJECTIVE AND OBJECTIVE BOX
83 yo female with multiple medical problems is admitted with hypokalemia.  consult was called for abnormal Retroperitoneal US- mobile material is seen layering within the bladder ?pus vs infected urine vs blood/hematuria, Prevoid 246cc. Pt is poor historian. No hematuria. No flank pain. Pt had h/o urinary retention at last admission. No fever or chills.           INTERPRETATION:  RENAL and BLADDER ULTRASOUND dated 1/25/2017 5:14 PM    Indication: 84-year-old female with chronic kidney disease.     Prior studies: Abdominal ultrasound 12/1/2016, retroperitoneal ultrasound   dated 5/9/2015, and retroperitoneal ultrasound 10/25/2016.      Findings:    RIGHT KIDNEY:  Length: 10.3 cm  Lesions: None  Hydronephrosis: None  Stones: None  Echogenicity: Increased.  Small perinephric fluid, more prominent than   surrounding the left kidney.    LEFT KIDNEY:  Length: 8.7 cm  Lesions: None  Hydronephrosis: None  Stones: None  Echogenicity: Increased. Trace perinephric fluid.    URINARY BLADDER:  Ureteral jets: Both visible.  Filling defects: Mobile material is seen layering within the bladder  Bladder volume: Pre-void: 246 ml; the patient refused to void.    Incidentally noted, the visualized portions of the liver show coarse   echotexture consistent with hepatocellular disease of nonspecific   etiology. Partially imaged cholelithiasis.    IMPRESSION:  Increased echogenicity of the kidneys bilaterally consistent with chronic   medical renal disease. Small/atrophic left kidney.    Mobile material is seenlayering within the bladder which may represent   pus/infected urine or blood/hematuria. Correlation with urinalysis is   recommended.          Vital Signs Last 24 Hrs  T(C): 36.4, Max: 37.1 (01-25 @ 17:06)  T(F): 97.6, Max: 98.8 (01-25 @ 17:06)  HR: 69 (66 - 74)  BP: 138/65 (138/65 - 161/74)  BP(mean): --  RR: 16 (16 - 17)  SpO2: 98% (96% - 98%)  I&O's Summary    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: S/P partial hysterectomy    MEDICATIONS  (STANDING):  predniSONE   Tablet 10milliGRAM(s) Oral daily  tamsulosin 0.4milliGRAM(s) Oral at bedtime  loratadine 10milliGRAM(s) Oral daily  atorvastatin 40milliGRAM(s) Oral at bedtime  carvedilol 6.25milliGRAM(s) Oral every 12 hours  fluticasone / salmeterol 250-50 MICROgram(s) Diskus 1Dose(s) Inhalation two times a day  zinc gluconate 100milliGRAM(s) Oral daily  pantoprazole    Tablet 40milliGRAM(s) Oral before breakfast  hydrALAZINE 50milliGRAM(s) Oral every 8 hours  calcium carbonate 1250 mG + Vitamin D (OsCal 500 + D) 1Tablet(s) Oral two times a day  multivitamin 1Tablet(s) Oral daily  cyanocobalamin 1000MICROGram(s) Oral daily  folic acid 1milliGRAM(s) Oral daily  thiamine 100milliGRAM(s) Oral daily  cholecalciferol 400Unit(s) Oral daily  ferrous    sulfate 325milliGRAM(s) Oral two times a day with meals  docusate sodium 100milliGRAM(s) Oral two times a day    MEDICATIONS  (PRN):  levalbuterol Inhalation 0.63milliGRAM(s) Inhalation every 4 hours PRN sob  acetaminophen   Tablet 650milliGRAM(s) Oral every 6 hours PRN Moderate Pain (4 - 6)    Allergies    angiotensin converting enzyme inhibitors (Angioedema)  aspirin (Unknown)  lactose (Diarrhea)  penicillin (Angioedema)  Seafood (Short breath)  shellfish (Short breath)  spinach (Unknown)    Intolerances          LABS:                        9.0    4.5   )-----------( 155      ( 26 Jan 2017 06:19 )             27.2     26 Jan 2017 06:19    142    |  110    |  48     ----------------------------<  102    4.3     |  21     |  2.28     Ca    6.0        26 Jan 2017 06:19  Mg     2.0       26 Jan 2017 06:19    TPro  5.3    /  Alb  2.2    /  TBili  0.9    /  DBili  x      /  AST  13     /  ALT  17     /  AlkPhos  56     26 Jan 2017 06:19 83 yo female with multiple medical problems is admitted with hypokalemia.  consult was called for abnormal Retroperitoneal US- mobile material is seen layering within the bladder ?pus vs infected urine vs blood/hematuria, Prevoid 246cc. No hematuria. No flank pain. Pt had h/o urinary retention at last admission. No fever or chills. Denies h/o smoking.           INTERPRETATION:  RENAL and BLADDER ULTRASOUND dated 1/25/2017 5:14 PM    Indication: 84-year-old female with chronic kidney disease.     Prior studies: Abdominal ultrasound 12/1/2016, retroperitoneal ultrasound   dated 5/9/2015, and retroperitoneal ultrasound 10/25/2016.      Findings:    RIGHT KIDNEY:  Length: 10.3 cm  Lesions: None  Hydronephrosis: None  Stones: None  Echogenicity: Increased.  Small perinephric fluid, more prominent than   surrounding the left kidney.    LEFT KIDNEY:  Length: 8.7 cm  Lesions: None  Hydronephrosis: None  Stones: None  Echogenicity: Increased. Trace perinephric fluid.    URINARY BLADDER:  Ureteral jets: Both visible.  Filling defects: Mobile material is seen layering within the bladder  Bladder volume: Pre-void: 246 ml; the patient refused to void.    Incidentally noted, the visualized portions of the liver show coarse   echotexture consistent with hepatocellular disease of nonspecific   etiology. Partially imaged cholelithiasis.    IMPRESSION:  Increased echogenicity of the kidneys bilaterally consistent with chronic   medical renal disease. Small/atrophic left kidney.    Mobile material is seenlayering within the bladder which may represent   pus/infected urine or blood/hematuria. Correlation with urinalysis is   recommended.          Vital Signs Last 24 Hrs  T(C): 36.4, Max: 37.1 (01-25 @ 17:06)  T(F): 97.6, Max: 98.8 (01-25 @ 17:06)  HR: 69 (66 - 74)  BP: 138/65 (138/65 - 161/74)  BP(mean): --  RR: 16 (16 - 17)  SpO2: 98% (96% - 98%)  I&O's Summary    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: S/P partial hysterectomy    MEDICATIONS  (STANDING):  predniSONE   Tablet 10milliGRAM(s) Oral daily  tamsulosin 0.4milliGRAM(s) Oral at bedtime  loratadine 10milliGRAM(s) Oral daily  atorvastatin 40milliGRAM(s) Oral at bedtime  carvedilol 6.25milliGRAM(s) Oral every 12 hours  fluticasone / salmeterol 250-50 MICROgram(s) Diskus 1Dose(s) Inhalation two times a day  zinc gluconate 100milliGRAM(s) Oral daily  pantoprazole    Tablet 40milliGRAM(s) Oral before breakfast  hydrALAZINE 50milliGRAM(s) Oral every 8 hours  calcium carbonate 1250 mG + Vitamin D (OsCal 500 + D) 1Tablet(s) Oral two times a day  multivitamin 1Tablet(s) Oral daily  cyanocobalamin 1000MICROGram(s) Oral daily  folic acid 1milliGRAM(s) Oral daily  thiamine 100milliGRAM(s) Oral daily  cholecalciferol 400Unit(s) Oral daily  ferrous    sulfate 325milliGRAM(s) Oral two times a day with meals  docusate sodium 100milliGRAM(s) Oral two times a day    MEDICATIONS  (PRN):  levalbuterol Inhalation 0.63milliGRAM(s) Inhalation every 4 hours PRN sob  acetaminophen   Tablet 650milliGRAM(s) Oral every 6 hours PRN Moderate Pain (4 - 6)    Allergies    angiotensin converting enzyme inhibitors (Angioedema)  aspirin (Unknown)  lactose (Diarrhea)  penicillin (Angioedema)  Seafood (Short breath)  shellfish (Short breath)  spinach (Unknown)    Intolerances          LABS:                        9.0    4.5   )-----------( 155      ( 26 Jan 2017 06:19 )             27.2     26 Jan 2017 06:19    142    |  110    |  48     ----------------------------<  102    4.3     |  21     |  2.28     Ca    6.0        26 Jan 2017 06:19  Mg     2.0       26 Jan 2017 06:19    TPro  5.3    /  Alb  2.2    /  TBili  0.9    /  DBili  x      /  AST  13     /  ALT  17     /  AlkPhos  56     26 Jan 2017 06:19

## 2017-01-26 NOTE — PROGRESS NOTE ADULT - PROBLEM SELECTOR PLAN 2
No ACEI/ARB due to ACEI allergy and CKD.   Hold Lasix due to acute hypokalemia, acute hypocalcemia and acute hypomagnesemia, in Cr. from baseline on admission. Lungs CTA B/L.   Dr. Jones/Dr. Bryan to assess when to resume Lasix at decreased dose from 40mg daily on admission EF improved as per Echo above  No ACEI/ARB due to ACEI allergy and CKD.   Hold Lasix due to acute hypokalemia, acute hypocalcemia and acute hypomagnesemia, in Cr. from baseline on admission. Lungs CTA B/L  Dr. Jones/Dr. Bryan to assess when to resume Lasix at decreased dose from 40mg daily on admission EF improved as per Echo above  No ACEI/ARB due to ACEI allergy and CKD.   Hold Lasix due to acute hypokalemia, acute hypocalcemia and acute hypomagnesemia, in Cr. from baseline on admission. Lungs CTA B/L. Euvolemic.   Dr. Jones/Dr. Bryan to assess if to resume Lasix at decreased dose on discharge or D/C Lasix on discharge and close out pt follow-up

## 2017-01-26 NOTE — PROGRESS NOTE ADULT - PROBLEM SELECTOR PLAN 4
Electrolyte in setting of Metabolic disorder as discussed w/ Dr. Hollins  Low electrolytes in setting on Lasix 40mg daily on admission as discussed w/ Dr. Bryan.   Ca < 5.0 on admission Repleted w/ 2 g IV Calcium Gluconate  x 2 this admission. Prolonged  repeat 452. Corrected Calcium 7.38 today. Cont. PO Calcium & Vitamin D per Dr. Bryan.  Vitamin D level 14.8 , PTH  131, AM Cortisol 13.1    Hypokalemia.    Resolved K now 4.3    Hypomagnesemia.    Resolved. Mg now 2.0 Electrolyte imbalance in setting of Metabolic disorder as discussed w/ Dr. Hollins  Low electrolytes in setting on Lasix 40mg daily on admission as discussed w/ Dr. Bryan.   Ca < 5.0 on admission Repleted w/ 2 g IV Calcium Gluconate  x 2 this admission. Prolonged  repeat 452. Corrected Calcium 7.38 today. Cont. PO Calcium & Vitamin D per Dr. Bryan.  Vitamin D level 14.8 , PTH  131, AM Cortisol 13.1    Hypokalemia.    Resolved K now 4.3    Hypomagnesemia.    Resolved. Mg now 2.0

## 2017-01-26 NOTE — PROGRESS NOTE ADULT - ASSESSMENT
85 y/o F DNR/DNI (MOLST form copy from Rehab in chart) ASA, Shellfish, ARB Allergy from [facility], w/ PMHX HTN, HLD, recent diagnosis of Takotsubo Cardiomyopathy/Systolic CHF EF 25-30% 12/2016, CKD stage 4 w/ h/o Urinary Retention, Asthma/COPD s/p recent exacerbation, Temporal Arteritis complicated by L eye legal blindness, SBO s/p bowel resection, RA, GERD, Chronic Anemia s/p prior blood transfusion, h/o hypocalcemia, h/o Asthma Exacerbation 2nd to +RSV in 12/2016, NSTEMI 12/2016 who presented w/ C/P and acute hypokalemia, acute hypocalcemia and acute hypomagnesemia, worsening CKD

## 2017-01-26 NOTE — PROGRESS NOTE ADULT - PROBLEM SELECTOR PLAN 1
C/P relieved w/ NTG in ER. H/o CAD. Peak Troponin 0.2, trended down to 0.07.    No ASA due to ASA allergy. No Plavix as per Dr. Jones due to h/o Anemia. Cont. Coreg, Lipitor.   No further ischemia work-up at this time as discussed w/ Dr. Jones.    Echo 1/24/16: mild concentric LVH. apical mild HK 55%, impaired left ventricular relaxation with mildly elevated left atrial pressure (8-14mmHg), Moderate AS (FERDINAND 1.2 cm2), The peak pressure gradient is 27mmHg, mean pressure gradient is 16 mmHg, mild to moderate AR, moderate pericardial effusion noted. EF improved from prior. Echo reviewed w/ Dr. oJnes C/P relieved w/ NTG in ER. H/o CAD. Peak Troponin 0.2, trended down to 0.07.    No ASA due to ASA allergy. No Plavix as per Dr. Jones due to h/o Anemia. Cont. Coreg, Lipitor  No further ischemia work-up at this time as discussed w/ Dr. Jones.    Echo 1/24/16: mild concentric LVH. apical mild HK 55%, impaired left ventricular relaxation with mildly elevated left atrial pressure (8-14mmHg), Moderate AS (FERDINAND 1.2 cm2), The peak pressure gradient is 27mmHg, mean pressure gradient is 16 mmHg, mild to moderate AR, moderate pericardial effusion noted. EF improved from prior. Echo reviewed w/ Dr. Jones

## 2017-01-26 NOTE — PROGRESS NOTE ADULT - PROBLEM SELECTOR PLAN 7
Cr. 2.3-2.4 in ER on admission and now 2.2 (baseline Cr. 1.61-2.08 in 12/2016).   On Flomax (h/o urinary retention).   UA/Urine Electrolytes reviewed Urine Cx pending     Renal US showed increased echogenicity of the kidneys bilaterally consistent with chronic  medical renal disease. Small/atrophic left kidney. Mobile material is seen layering within the bladder which may represent  pus/infected urine or blood/hematuria. UA showed small leukocyte esterase, many WBCs.     Urology consulted Dr. Pisano consulted for Renal US findings  14Fr irby was inserted by , bladder was irrigated with NS, urine cloudy, no pus or hematuria. Ibry cath was removed, per  no acute  intervention needed at this time. No mass identified on US. F/u urine culture. Can repeat renal bladder US as outpatient per

## 2017-01-27 ENCOUNTER — TRANSCRIPTION ENCOUNTER (OUTPATIENT)
Age: 82
End: 2017-01-27

## 2017-01-27 VITALS — WEIGHT: 113.32 LBS

## 2017-01-27 LAB
ANION GAP SERPL CALC-SCNC: 11 MMOL/L — SIGNIFICANT CHANGE UP (ref 9–16)
BUN SERPL-MCNC: 47 MG/DL — HIGH (ref 7–23)
CALCIUM SERPL-MCNC: 6.4 MG/DL — CRITICAL LOW (ref 8.5–10.5)
CHLORIDE SERPL-SCNC: 109 MMOL/L — HIGH (ref 96–108)
CO2 SERPL-SCNC: 22 MMOL/L — SIGNIFICANT CHANGE UP (ref 22–31)
CREAT SERPL-MCNC: 2.22 MG/DL — HIGH (ref 0.5–1.3)
GLUCOSE SERPL-MCNC: 85 MG/DL — SIGNIFICANT CHANGE UP (ref 70–99)
HCT VFR BLD CALC: 24.7 % — LOW (ref 34.5–45)
HGB BLD-MCNC: 8.2 G/DL — LOW (ref 11.5–15.5)
MAGNESIUM SERPL-MCNC: 1.9 MG/DL — SIGNIFICANT CHANGE UP (ref 1.6–2.4)
MCHC RBC-ENTMCNC: 25.2 PG — LOW (ref 27–34)
MCHC RBC-ENTMCNC: 33.2 G/DL — SIGNIFICANT CHANGE UP (ref 32–36)
MCV RBC AUTO: 75.8 FL — LOW (ref 80–100)
PLATELET # BLD AUTO: 120 K/UL — LOW (ref 150–400)
POTASSIUM SERPL-MCNC: 3.8 MMOL/L — SIGNIFICANT CHANGE UP (ref 3.5–5.3)
POTASSIUM SERPL-SCNC: 3.8 MMOL/L — SIGNIFICANT CHANGE UP (ref 3.5–5.3)
RBC # BLD: 3.26 M/UL — LOW (ref 3.8–5.2)
RBC # FLD: 18.6 % — HIGH (ref 10.3–16.9)
SODIUM SERPL-SCNC: 142 MMOL/L — SIGNIFICANT CHANGE UP (ref 135–145)
WBC # BLD: 5.6 K/UL — SIGNIFICANT CHANGE UP (ref 3.8–10.5)
WBC # FLD AUTO: 5.6 K/UL — SIGNIFICANT CHANGE UP (ref 3.8–10.5)

## 2017-01-27 PROCEDURE — 80048 BASIC METABOLIC PNL TOTAL CA: CPT

## 2017-01-27 PROCEDURE — 83735 ASSAY OF MAGNESIUM: CPT

## 2017-01-27 PROCEDURE — 82553 CREATINE MB FRACTION: CPT

## 2017-01-27 PROCEDURE — 84133 ASSAY OF URINE POTASSIUM: CPT

## 2017-01-27 PROCEDURE — 85027 COMPLETE CBC AUTOMATED: CPT

## 2017-01-27 PROCEDURE — 82607 VITAMIN B-12: CPT

## 2017-01-27 PROCEDURE — 84560 ASSAY OF URINE/URIC ACID: CPT

## 2017-01-27 PROCEDURE — 96374 THER/PROPH/DIAG INJ IV PUSH: CPT

## 2017-01-27 PROCEDURE — 82533 TOTAL CORTISOL: CPT

## 2017-01-27 PROCEDURE — 84590 ASSAY OF VITAMIN A: CPT

## 2017-01-27 PROCEDURE — 83880 ASSAY OF NATRIURETIC PEPTIDE: CPT

## 2017-01-27 PROCEDURE — 83970 ASSAY OF PARATHORMONE: CPT

## 2017-01-27 PROCEDURE — 94640 AIRWAY INHALATION TREATMENT: CPT

## 2017-01-27 PROCEDURE — 87186 SC STD MICRODIL/AGAR DIL: CPT

## 2017-01-27 PROCEDURE — 82330 ASSAY OF CALCIUM: CPT

## 2017-01-27 PROCEDURE — 85025 COMPLETE CBC W/AUTO DIFF WBC: CPT

## 2017-01-27 PROCEDURE — 93005 ELECTROCARDIOGRAM TRACING: CPT

## 2017-01-27 PROCEDURE — 82306 VITAMIN D 25 HYDROXY: CPT

## 2017-01-27 PROCEDURE — 81003 URINALYSIS AUTO W/O SCOPE: CPT

## 2017-01-27 PROCEDURE — 86900 BLOOD TYPING SEROLOGIC ABO: CPT

## 2017-01-27 PROCEDURE — 86901 BLOOD TYPING SEROLOGIC RH(D): CPT

## 2017-01-27 PROCEDURE — 83935 ASSAY OF URINE OSMOLALITY: CPT

## 2017-01-27 PROCEDURE — 84597 ASSAY OF VITAMIN K: CPT

## 2017-01-27 PROCEDURE — 85730 THROMBOPLASTIN TIME PARTIAL: CPT

## 2017-01-27 PROCEDURE — 36415 COLL VENOUS BLD VENIPUNCTURE: CPT

## 2017-01-27 PROCEDURE — 82570 ASSAY OF URINE CREATININE: CPT

## 2017-01-27 PROCEDURE — 97110 THERAPEUTIC EXERCISES: CPT

## 2017-01-27 PROCEDURE — 84436 ASSAY OF TOTAL THYROXINE: CPT

## 2017-01-27 PROCEDURE — 82550 ASSAY OF CK (CPK): CPT

## 2017-01-27 PROCEDURE — 93306 TTE W/DOPPLER COMPLETE: CPT

## 2017-01-27 PROCEDURE — 84484 ASSAY OF TROPONIN QUANT: CPT

## 2017-01-27 PROCEDURE — 80053 COMPREHEN METABOLIC PANEL: CPT

## 2017-01-27 PROCEDURE — 71045 X-RAY EXAM CHEST 1 VIEW: CPT

## 2017-01-27 PROCEDURE — 99285 EMERGENCY DEPT VISIT HI MDM: CPT | Mod: 25

## 2017-01-27 PROCEDURE — 82746 ASSAY OF FOLIC ACID SERUM: CPT

## 2017-01-27 PROCEDURE — 83550 IRON BINDING TEST: CPT

## 2017-01-27 PROCEDURE — 80061 LIPID PANEL: CPT

## 2017-01-27 PROCEDURE — 99233 SBSQ HOSP IP/OBS HIGH 50: CPT

## 2017-01-27 PROCEDURE — 84480 ASSAY TRIIODOTHYRONINE (T3): CPT

## 2017-01-27 PROCEDURE — 84425 ASSAY OF VITAMIN B-1: CPT

## 2017-01-27 PROCEDURE — 82728 ASSAY OF FERRITIN: CPT

## 2017-01-27 PROCEDURE — 85610 PROTHROMBIN TIME: CPT

## 2017-01-27 PROCEDURE — 82310 ASSAY OF CALCIUM: CPT

## 2017-01-27 PROCEDURE — 81001 URINALYSIS AUTO W/SCOPE: CPT

## 2017-01-27 PROCEDURE — 84300 ASSAY OF URINE SODIUM: CPT

## 2017-01-27 PROCEDURE — 87086 URINE CULTURE/COLONY COUNT: CPT

## 2017-01-27 PROCEDURE — 84443 ASSAY THYROID STIM HORMONE: CPT

## 2017-01-27 PROCEDURE — 86850 RBC ANTIBODY SCREEN: CPT

## 2017-01-27 PROCEDURE — 76770 US EXAM ABDO BACK WALL COMP: CPT

## 2017-01-27 PROCEDURE — 97161 PT EVAL LOW COMPLEX 20 MIN: CPT

## 2017-01-27 RX ORDER — ZINC GLUCONATE 30 MG
1 TABLET ORAL
Qty: 0 | Refills: 0 | COMMUNITY

## 2017-01-27 RX ORDER — FOLIC ACID 0.8 MG
1 TABLET ORAL
Qty: 30 | Refills: 2 | OUTPATIENT
Start: 2017-01-27 | End: 2017-04-26

## 2017-01-27 RX ORDER — CARVEDILOL PHOSPHATE 80 MG/1
1 CAPSULE, EXTENDED RELEASE ORAL
Qty: 60 | Refills: 2 | OUTPATIENT
Start: 2017-01-27 | End: 2017-04-26

## 2017-01-27 RX ORDER — PREGABALIN 225 MG/1
1 CAPSULE ORAL
Qty: 30 | Refills: 3 | OUTPATIENT
Start: 2017-01-27 | End: 2017-05-26

## 2017-01-27 RX ORDER — LORATADINE 10 MG/1
1 TABLET ORAL
Qty: 0 | Refills: 0 | COMMUNITY

## 2017-01-27 RX ORDER — FLUTICASONE PROPIONATE AND SALMETEROL 50; 250 UG/1; UG/1
1 POWDER ORAL; RESPIRATORY (INHALATION)
Qty: 0 | Refills: 0 | COMMUNITY

## 2017-01-27 RX ORDER — FUROSEMIDE 40 MG
1 TABLET ORAL
Qty: 30 | Refills: 3 | OUTPATIENT
Start: 2017-01-27 | End: 2017-05-26

## 2017-01-27 RX ORDER — POTASSIUM CHLORIDE 20 MEQ
20 PACKET (EA) ORAL ONCE
Qty: 0 | Refills: 0 | Status: COMPLETED | OUTPATIENT
Start: 2017-01-27 | End: 2017-01-27

## 2017-01-27 RX ORDER — ZINC GLUCONATE 30 MG
1 TABLET ORAL
Qty: 30 | Refills: 2 | OUTPATIENT
Start: 2017-01-27 | End: 2017-04-26

## 2017-01-27 RX ORDER — FLUTICASONE PROPIONATE AND SALMETEROL 50; 250 UG/1; UG/1
1 POWDER ORAL; RESPIRATORY (INHALATION)
Qty: 1 | Refills: 2 | OUTPATIENT
Start: 2017-01-27 | End: 2017-04-26

## 2017-01-27 RX ORDER — ATORVASTATIN CALCIUM 80 MG/1
1 TABLET, FILM COATED ORAL
Qty: 30 | Refills: 2 | OUTPATIENT
Start: 2017-01-27 | End: 2017-04-26

## 2017-01-27 RX ORDER — FLUTICASONE PROPIONATE AND SALMETEROL 50; 250 UG/1; UG/1
1 POWDER ORAL; RESPIRATORY (INHALATION)
Qty: 1 | Refills: 2 | COMMUNITY
Start: 2017-01-27 | End: 2017-04-26

## 2017-01-27 RX ORDER — PREGABALIN 225 MG/1
1 CAPSULE ORAL
Qty: 0 | Refills: 0 | COMMUNITY

## 2017-01-27 RX ORDER — CHOLECALCIFEROL (VITAMIN D3) 125 MCG
1 CAPSULE ORAL
Qty: 30 | Refills: 2 | OUTPATIENT
Start: 2017-01-27 | End: 2017-04-26

## 2017-01-27 RX ORDER — FERROUS SULFATE 325(65) MG
1 TABLET ORAL
Qty: 60 | Refills: 2 | OUTPATIENT
Start: 2017-01-27 | End: 2017-04-26

## 2017-01-27 RX ORDER — POLYETHYLENE GLYCOL 3350 17 G/17G
17 POWDER, FOR SOLUTION ORAL ONCE
Qty: 0 | Refills: 0 | Status: COMPLETED | OUTPATIENT
Start: 2017-01-27 | End: 2017-01-27

## 2017-01-27 RX ORDER — FUROSEMIDE 40 MG
1 TABLET ORAL
Qty: 0 | Refills: 0 | COMMUNITY

## 2017-01-27 RX ORDER — PANTOPRAZOLE SODIUM 20 MG/1
1 TABLET, DELAYED RELEASE ORAL
Qty: 30 | Refills: 2 | OUTPATIENT
Start: 2017-01-27 | End: 2017-04-26

## 2017-01-27 RX ORDER — POTASSIUM CHLORIDE 20 MEQ
1 PACKET (EA) ORAL
Qty: 60 | Refills: 3 | OUTPATIENT
Start: 2017-01-27 | End: 2017-05-26

## 2017-01-27 RX ORDER — ATORVASTATIN CALCIUM 80 MG/1
1 TABLET, FILM COATED ORAL
Qty: 0 | Refills: 0 | COMMUNITY

## 2017-01-27 RX ORDER — MAGNESIUM OXIDE 400 MG ORAL TABLET 241.3 MG
400 TABLET ORAL ONCE
Qty: 0 | Refills: 0 | Status: COMPLETED | OUTPATIENT
Start: 2017-01-27 | End: 2017-01-27

## 2017-01-27 RX ORDER — LORATADINE 10 MG/1
1 TABLET ORAL
Qty: 30 | Refills: 2 | OUTPATIENT
Start: 2017-01-27 | End: 2017-04-26

## 2017-01-27 RX ORDER — TAMSULOSIN HYDROCHLORIDE 0.4 MG/1
1 CAPSULE ORAL
Qty: 30 | Refills: 2 | OUTPATIENT
Start: 2017-01-27 | End: 2017-04-26

## 2017-01-27 RX ORDER — CARVEDILOL PHOSPHATE 80 MG/1
6.25 CAPSULE, EXTENDED RELEASE ORAL EVERY 12 HOURS
Qty: 0 | Refills: 0 | Status: DISCONTINUED | OUTPATIENT
Start: 2017-01-27 | End: 2017-01-27

## 2017-01-27 RX ORDER — LEVALBUTEROL 1.25 MG/.5ML
3 SOLUTION, CONCENTRATE RESPIRATORY (INHALATION)
Qty: 1 | Refills: 2 | OUTPATIENT
Start: 2017-01-27 | End: 2017-04-26

## 2017-01-27 RX ORDER — CARVEDILOL PHOSPHATE 80 MG/1
1 CAPSULE, EXTENDED RELEASE ORAL
Qty: 0 | Refills: 0 | COMMUNITY

## 2017-01-27 RX ORDER — THIAMINE MONONITRATE (VIT B1) 100 MG
1 TABLET ORAL
Qty: 30 | Refills: 2 | OUTPATIENT
Start: 2017-01-27 | End: 2017-04-26

## 2017-01-27 RX ORDER — FUROSEMIDE 40 MG
1 TABLET ORAL
Qty: 30 | Refills: 3 | COMMUNITY
Start: 2017-01-27 | End: 2017-05-26

## 2017-01-27 RX ADMIN — LORATADINE 10 MILLIGRAM(S): 10 TABLET ORAL at 11:37

## 2017-01-27 RX ADMIN — PREGABALIN 500 MICROGRAM(S): 225 CAPSULE ORAL at 11:37

## 2017-01-27 RX ADMIN — Medication 50 MILLIGRAM(S): at 14:08

## 2017-01-27 RX ADMIN — Medication 325 MILLIGRAM(S): at 07:32

## 2017-01-27 RX ADMIN — Medication 400 UNIT(S): at 11:37

## 2017-01-27 RX ADMIN — Medication 1 TABLET(S): at 11:37

## 2017-01-27 RX ADMIN — Medication 100 MILLIGRAM(S): at 11:37

## 2017-01-27 RX ADMIN — Medication 50 MILLIGRAM(S): at 06:31

## 2017-01-27 RX ADMIN — Medication 10 MILLIGRAM(S): at 06:31

## 2017-01-27 RX ADMIN — MAGNESIUM OXIDE 400 MG ORAL TABLET 400 MILLIGRAM(S): 241.3 TABLET ORAL at 09:09

## 2017-01-27 RX ADMIN — Medication 20 MILLIEQUIVALENT(S): at 09:09

## 2017-01-27 RX ADMIN — POLYETHYLENE GLYCOL 3350 17 GRAM(S): 17 POWDER, FOR SOLUTION ORAL at 07:32

## 2017-01-27 RX ADMIN — Medication 1 MILLIGRAM(S): at 11:37

## 2017-01-27 RX ADMIN — PANTOPRAZOLE SODIUM 40 MILLIGRAM(S): 20 TABLET, DELAYED RELEASE ORAL at 06:32

## 2017-01-27 RX ADMIN — CARVEDILOL PHOSPHATE 6.25 MILLIGRAM(S): 80 CAPSULE, EXTENDED RELEASE ORAL at 06:31

## 2017-01-27 RX ADMIN — Medication 1 TABLET(S): at 07:33

## 2017-01-27 RX ADMIN — FLUTICASONE PROPIONATE AND SALMETEROL 1 DOSE(S): 50; 250 POWDER ORAL; RESPIRATORY (INHALATION) at 09:09

## 2017-01-27 NOTE — DISCHARGE NOTE ADULT - SECONDARY DIAGNOSIS.
Chronic systolic congestive heart failure CKD (chronic kidney disease), stage IV Asthma Essential hypertension Electrolyte abnormality Chronic anemia

## 2017-01-27 NOTE — DISCHARGE NOTE ADULT - CARE PLAN
Principal Discharge DX:	Cortisol deficiency  Goal:	It seems like your adrenal glands that sit on top of your kidneys are not working correctly. You got a strong dose of steroids while you are in the hospital. You need to be on Prednisone 10mg once daily.  Instructions for follow-up, activity and diet:	Follow up with Dr. Jones in 1-2 weeks.  Secondary Diagnosis:	Chronic systolic congestive heart failure  Goal:	STOP Lasix 40mg once daily. START taking Lasix 20mg once daily. Take Potassium 10mequ once daily to keep your potassium levels in a normal range.  Instructions for follow-up, activity and diet:	Follow up with Dr. Jones in 1-2 weeks. You will need another Echocardiogram in 2 weeks.  Secondary Diagnosis:	CKD (chronic kidney disease), stage IV  Goal:	Your kidneys were functioning less than normal when you came into the hospital. Your kidney functions improved slightly while you were here. You need  Secondary Diagnosis:	Asthma  Goal:	Continue home asthma medications  Secondary Diagnosis:	Essential hypertension  Goal:	Continue home blood pressure medications  Secondary Diagnosis:	Electrolyte abnormality  Goal:	Continue Thiamine 100mg once daily. Continue Calcium Carbonate 500mg + D once daily. Zinc 100mg once daily. Folic 1mg once daily. Cyanocobalamin 1000mg once daily. These are all VERY IMPORTANT to take to keep your electrolytes in a good range. Principal Discharge DX:	Cortisol deficiency  Goal:	It seems like your adrenal glands that sit on top of your kidneys are not working correctly. You got a strong dose of steroids while you are in the hospital. You need to be on Prednisone 10mg once daily.  Instructions for follow-up, activity and diet:	Follow up with Dr. Jones in 1-2 weeks.  Secondary Diagnosis:	Chronic systolic congestive heart failure  Goal:	STOP Lasix 40mg once daily. START taking Lasix 20mg once daily. Take Potassium 10mequ once daily to keep your potassium levels in a normal range.  Instructions for follow-up, activity and diet:	Follow up with Dr. Jones in 1-2 weeks. You will need another Echocardiogram in 2 weeks.  Secondary Diagnosis:	CKD (chronic kidney disease), stage IV  Goal:	Your kidneys were functioning less than normal when you came into the hospital. Your kidney functions improved slightly while you were here. You need  Secondary Diagnosis:	Asthma  Goal:	Continue home asthma medications  Secondary Diagnosis:	Essential hypertension  Goal:	Continue home blood pressure medications  Secondary Diagnosis:	Electrolyte abnormality  Goal:	Continue Thiamine 100mg once daily. Continue Oysco 500mg with D 500,g0200 international units twice a day. Zinc 100mg once daily. Folic 1mg once daily. Cyanocobalamin 1000mg once daily. These are all VERY IMPORTANT to take to keep your electrolytes in a good range. Principal Discharge DX:	Cortisol deficiency  Goal:	It seems like your adrenal glands that sit on top of your kidneys are not working correctly. You got a strong dose of steroids while you are in the hospital. You need to be on Prednisone 10mg once daily. Take this every day and follow up with Dr. Hollins in 4 weeks for Prednisone taper.  Instructions for follow-up, activity and diet:	Follow up with Dr. Jones in 1-2 weeks.  Secondary Diagnosis:	Chronic systolic congestive heart failure  Goal:	STOP Lasix 40mg once daily. START taking Lasix 20mg once daily. Take Potassium 10mequ once daily to keep your potassium levels in a normal range.  Instructions for follow-up, activity and diet:	Follow up with Dr. Jones in 1-2 weeks. You will need another Echocardiogram in 2 weeks.  Secondary Diagnosis:	CKD (chronic kidney disease), stage IV  Goal:	Your kidneys were functioning less than normal when you came into the hospital. Your kidney functions improved slightly while you were here. You need  Secondary Diagnosis:	Asthma  Goal:	Continue home asthma medications  Secondary Diagnosis:	Essential hypertension  Goal:	Continue home blood pressure medications  Secondary Diagnosis:	Electrolyte abnormality  Goal:	Continue Thiamine 100mg once daily. Continue Oysco 500mg with D 500,g0200 international units twice a day. Zinc 100mg once daily. Folic 1mg once daily. Cyanocobalamin 1000mg once daily. These are all VERY IMPORTANT to take to keep your electrolytes in a good range. Principal Discharge DX:	Cortisol deficiency  Goal:	It seems like your adrenal glands that sit on top of your kidneys are not working correctly. You got a strong dose of steroids while you are in the hospital. You need to be on Prednisone 10mg once daily. Take this every day and follow up with Dr. Hollins in 4 weeks for Prednisone taper.  Instructions for follow-up, activity and diet:	Follow up with Dr. Jones in 1-2 weeks. PLEASE  ALL YOUR MEDICATIONS AT [facility] [address] [place] [address].  Secondary Diagnosis:	Chronic systolic congestive heart failure  Goal:	STOP Lasix 40mg once daily. START taking Lasix 20mg once daily. Take Potassium 10mequ once daily to keep your potassium levels in a normal range.  Instructions for follow-up, activity and diet:	Follow up with Dr. Jones in 1-2 weeks. You will need another Echocardiogram in 2 weeks.  Secondary Diagnosis:	CKD (chronic kidney disease), stage IV  Goal:	Your kidneys were functioning less than normal when you came into the hospital. Your kidney functions improved slightly while you were here. You need  Secondary Diagnosis:	Asthma  Goal:	Continue home asthma medications  Secondary Diagnosis:	Essential hypertension  Goal:	Continue home blood pressure medications  Secondary Diagnosis:	Electrolyte abnormality  Goal:	Continue Thiamine 100mg once daily. Continue Oysco 500mg with D 500,g0200 international units twice a day. Zinc 100mg once daily. Folic 1mg once daily. Cyanocobalamin 1000mg once daily. These are all VERY IMPORTANT to take to keep your electrolytes in a good range. Principal Discharge DX:	Cortisol deficiency  Goal:	It seems like your adrenal glands that sit on top of your kidneys are not working correctly. You got a strong dose of steroids while you are in the hospital. You need to be on Prednisone 10mg once daily. Take this every day and follow up with Dr. Hollins in 4 weeks for Prednisone taper.  Instructions for follow-up, activity and diet:	Follow up with Dr. Jones in 1-2 weeks. PLEASE  ALL YOUR MEDICATIONS AT [facility] [address] [place] [address].  Secondary Diagnosis:	Chronic systolic congestive heart failure  Goal:	STOP Lasix 40mg once daily. START taking Lasix 20mg once daily. Take Potassium 10mequ once daily to keep your potassium levels in a normal range.  Instructions for follow-up, activity and diet:	Follow up with Dr. Jones in 1-2 weeks. You will need another Echocardiogram in 2 weeks.  Secondary Diagnosis:	CKD (chronic kidney disease), stage IV  Goal:	Your kidneys were functioning less than normal when you came into the hospital. Your kidney functions improved slightly while you were here. You need  Secondary Diagnosis:	Asthma  Goal:	Continue home asthma medications  Secondary Diagnosis:	Essential hypertension  Goal:	Continue home blood pressure medications  Secondary Diagnosis:	Electrolyte abnormality  Goal:	Continue Thiamine 100mg once daily. Continue Oysco 500mg with D 500,g0200 international units twice a day. Zinc 100mg once daily. Folic 1mg once daily. Cyanocobalamin 1000mg once daily. These are all VERY IMPORTANT to take to keep your electrolytes in a good range. Principal Discharge DX:	Cortisol deficiency  Goal:	It seems like your adrenal glands that sit on top of your kidneys are not working correctly. You got a strong dose of steroids while you are in the hospital. You need to be on Prednisone 10mg once daily. Take this every day and follow up with Dr. Hollins in 4 weeks for Prednisone taper.  Instructions for follow-up, activity and diet:	Follow up with Dr. Jones in 1-2 weeks. PLEASE  ALL YOUR MEDICATIONS AT [facility] [address] [place] [address].  Secondary Diagnosis:	Chronic systolic congestive heart failure  Goal:	STOP Lasix 40mg once daily. START taking Lasix 20mg once daily. Take Potassium 10mequ once daily to keep your potassium levels in a normal range.  Instructions for follow-up, activity and diet:	Follow up with Dr. Jones in 1-2 weeks. You will need another Echocardiogram in 2 weeks.  Secondary Diagnosis:	CKD (chronic kidney disease), stage IV  Goal:	Your kidneys were functioning less than normal when you came into the hospital. Your kidney functions improved slightly while you were here. You need  Secondary Diagnosis:	Asthma  Goal:	Continue home asthma medications  Secondary Diagnosis:	Essential hypertension  Goal:	Continue home blood pressure medications  Secondary Diagnosis:	Electrolyte abnormality  Goal:	Continue Thiamine 100mg once daily. Continue Oysco 500mg with D 500,g0200 international units twice a day. Zinc 100mg once daily. Folic 1mg once daily. Cyanocobalamin 1000mg once daily. These are all VERY IMPORTANT to take to keep your electrolytes in a good range.  Secondary Diagnosis:	Chronic anemia  Goal:	Your hemoglobin/hematocrit (blood counts) are low. You have iron deficiency anemia meaning your blood counts are low because you do not have enough iron in your body. Take Ferrous Sulfate 325mg twice a day to increase the iron in your body. Take laxatives if you get constipation with the ferrous sulfate. Principal Discharge DX:	Cortisol deficiency  Goal:	It seems like your adrenal glands that sit on top of your kidneys are not working correctly. You got a strong dose of steroids while you are in the hospital. You need to be on Prednisone 10mg once daily. Take this every day and follow up with Dr. Hollins in 4 weeks for Prednisone taper.  Instructions for follow-up, activity and diet:	Follow up with Dr. Jones in 1-2 weeks. PLEASE  ALL YOUR MEDICATIONS AT [facility] [address] [place] [address].  Secondary Diagnosis:	Chronic systolic congestive heart failure  Goal:	STOP Lasix 40mg once daily. START taking Lasix 20mg once daily. Take Potassium 10mequ once daily to keep your potassium levels in a normal range.  Instructions for follow-up, activity and diet:	Follow up with Dr. Jones in 1-2 weeks. You will need another Echocardiogram in 2 weeks.  Secondary Diagnosis:	CKD (chronic kidney disease), stage IV  Goal:	Your kidneys were functioning less than normal when you came into the hospital. Your kidney functions improved slightly while you were here. You need  Secondary Diagnosis:	Asthma  Goal:	Continue home asthma medications  Secondary Diagnosis:	Essential hypertension  Goal:	Continue home blood pressure medications  Secondary Diagnosis:	Electrolyte abnormality  Goal:	Continue Thiamine 100mg once daily. Continue Oysco 500mg with D 500,g0200 international units twice a day. Zinc 100mg once daily. Folic 1mg once daily. Cyanocobalamin 1000mg once daily. These are all VERY IMPORTANT to take to keep your electrolytes in a good range.  Secondary Diagnosis:	Chronic anemia  Goal:	Your hemoglobin/hematocrit (blood counts) are low. You have iron deficiency anemia meaning your blood counts are low because you do not have enough iron in your body. Take Ferrous Sulfate 325mg twice a day to increase the iron in your body. Take laxatives if you get constipation with the ferrous sulfate. Principal Discharge DX:	Cortisol deficiency  Goal:	It seems like your adrenal glands that sit on top of your kidneys are not working correctly. You got a strong dose of steroids while you are in the hospital. You need to be on Prednisone 10mg once daily. Take this every day and follow up with Dr. Hollins in 4 weeks for Prednisone taper.  Instructions for follow-up, activity and diet:	Follow up with Dr. Jones in 1-2 weeks. PLEASE  ALL YOUR MEDICATIONS AT [facility] [address] [place] [address].  Secondary Diagnosis:	Chronic systolic congestive heart failure  Goal:	STOP Lasix 40mg once daily. START taking Lasix 20mg once daily. Take Potassium 10mequ once daily to keep your potassium levels in a normal range.  Instructions for follow-up, activity and diet:	Follow up with Dr. Jones in 1-2 weeks. You will need another Echocardiogram in 2 weeks.  Secondary Diagnosis:	CKD (chronic kidney disease), stage IV  Goal:	Your kidneys were functioning less than normal when you came into the hospital. Your kidney functions improved slightly while you were here. You need  Secondary Diagnosis:	Asthma  Goal:	Continue home asthma medications  Secondary Diagnosis:	Essential hypertension  Goal:	Continue home blood pressure medications  Secondary Diagnosis:	Electrolyte abnormality  Goal:	Continue Thiamine 100mg once daily. Continue Oysco 500mg with D 500,g0200 international units twice a day. Zinc 100mg once daily. Folic 1mg once daily. Cyanocobalamin 1000mg once daily. These are all VERY IMPORTANT to take to keep your electrolytes in a good range.  Secondary Diagnosis:	Chronic anemia  Goal:	Your hemoglobin/hematocrit (blood counts) are low. You have iron deficiency anemia meaning your blood counts are low because you do not have enough iron in your body. Take Ferrous Sulfate 325mg twice a day to increase the iron in your body. Take laxatives if you get constipation with the ferrous sulfate. Principal Discharge DX:	Cortisol deficiency  Goal:	It seems like your adrenal glands that sit on top of your kidneys are not working correctly. You got a strong dose of steroids while you are in the hospital. You need to be on Prednisone 10mg once daily. Take this every day and follow up with Dr. Hollins in 4 weeks for Prednisone taper.  Instructions for follow-up, activity and diet:	Follow up with Dr. Jones in 1-2 weeks. PLEASE  ALL YOUR MEDICATIONS AT [facility] [address] [place] [address].  Secondary Diagnosis:	Chronic systolic congestive heart failure  Goal:	STOP Lasix 40mg once daily. START taking Lasix 20mg once daily. Take Potassium 10mequ once daily to keep your potassium levels in a normal range.  Instructions for follow-up, activity and diet:	Follow up with Dr. Jones in 1-2 weeks. You will need another Echocardiogram in 2 weeks.  Secondary Diagnosis:	CKD (chronic kidney disease), stage IV  Goal:	Your kidneys were functioning less than normal when you came into the hospital. Your kidney functions improved slightly while you were here. You need  Secondary Diagnosis:	Asthma  Goal:	Continue home asthma medications  Secondary Diagnosis:	Essential hypertension  Goal:	Continue home blood pressure medications  Secondary Diagnosis:	Electrolyte abnormality  Goal:	Continue Thiamine 100mg once daily. Continue Oysco 500mg with D 500,g0200 international units twice a day. Zinc 100mg once daily. Folic 1mg once daily. Cyanocobalamin 1000mg once daily. These are all VERY IMPORTANT to take to keep your electrolytes in a good range.  Secondary Diagnosis:	Chronic anemia  Goal:	Your hemoglobin/hematocrit (blood counts) are low. You have iron deficiency anemia meaning your blood counts are low because you do not have enough iron in your body. Take Ferrous Sulfate 325mg twice a day to increase the iron in your body. Take laxatives if you get constipation with the ferrous sulfate.

## 2017-01-27 NOTE — DISCHARGE NOTE ADULT - PLAN OF CARE
It seems like your adrenal glands that sit on top of your kidneys are not working correctly. You got a strong dose of steroids while you are in the hospital. You need to be on Prednisone 10mg once daily. STOP Lasix 40mg once daily. START taking Lasix 20mg once daily. Take Potassium 10mequ once daily to keep your potassium levels in a normal range. Your kidneys were functioning less than normal when you came into the hospital. Your kidney functions improved slightly while you were here. You need Follow up with Dr. Jones in 1-2 weeks. Follow up with Dr. Jones in 1-2 weeks. You will need another Echocardiogram in 2 weeks. Continue home asthma medications Continue home blood pressure medications Continue Thiamine 100mg once daily. Continue Calcium Carbonate 500mg + D once daily. Zinc 100mg once daily. Folic 1mg once daily. Cyanocobalamin 1000mg once daily. These are all VERY IMPORTANT to take to keep your electrolytes in a good range. Continue Thiamine 100mg once daily. Continue Oysco 500mg with D 500,g0200 international units twice a day. Zinc 100mg once daily. Folic 1mg once daily. Cyanocobalamin 1000mg once daily. These are all VERY IMPORTANT to take to keep your electrolytes in a good range. It seems like your adrenal glands that sit on top of your kidneys are not working correctly. You got a strong dose of steroids while you are in the hospital. You need to be on Prednisone 10mg once daily. Take this every day and follow up with Dr. Hollins in 4 weeks for Prednisone taper. Follow up with Dr. Jones in 1-2 weeks. PLEASE  ALL YOUR MEDICATIONS AT [facility] [address] [place] [address]. Your hemoglobin/hematocrit (blood counts) are low. You have iron deficiency anemia meaning your blood counts are low because you do not have enough iron in your body. Take Ferrous Sulfate 325mg twice a day to increase the iron in your body. Take laxatives if you get constipation with the ferrous sulfate.

## 2017-01-27 NOTE — DISCHARGE NOTE ADULT - PATIENT PORTAL LINK FT
“You can access the [facility] Patient Portal, offered by [facility], by registering with the following website: [email or web address]”

## 2017-01-27 NOTE — DISCHARGE NOTE ADULT - MEDICATION SUMMARY - MEDICATIONS TO CHANGE
I will SWITCH the dose or number of times a day I take the medications listed below when I get home from the hospital:  None I will SWITCH the dose or number of times a day I take the medications listed below when I get home from the hospital:    Calcium 500+D  -- 1 tab(s) by mouth 2 times a day

## 2017-01-27 NOTE — DISCHARGE NOTE ADULT - HOSPITAL COURSE
85 y/o F DNR/DNI (MOLST form copy from Rehab in chart) ASA, Shellfish, ARB Allergy from [facility], w/ PMHX HTN, HLD, recent diagnosis of Takotsubo Cardiomyopathy/Systolic CHF EF 25-30% 12/2016, CKD stage 4 w/ h/o Urinary Retention, Asthma/COPD s/p recent exacerbation, Temporal Arteritis complicated by L eye legal blindness, SBO s/p bowel resection, RA, GERD, Chronic Anemia s/p prior blood transfusion, h/o hypocalcemia, h/o Asthma Exacerbation 2nd to +RSV in 12/2016, NSTEMI 12/2016 (medical management) who presented w/ C/P and acute hypokalemia, acute hypocalcemia and acute hypomagnesemia, worsening renal function      CAD (coronary artery disease).    C/P relieved w/ NTG in ER. H/o CAD. Peak Troponin 0.2, trended down to 0.07.    No ASA due to ASA allergy. No Plavix as per Dr. Jones due to h/o Anemia. Cont. Coreg, Lipitor  No further ischemia work-up at this time as discussed w/ Dr. Jones. Pt will get Echocardiogram in 2 weeks upon discharge.     Echo 1/24/16: mild concentric LVH. apical mild HK 55%, impaired left ventricular relaxation with mildly elevated left atrial pressure (8-14mmHg), Moderate AS (FERDINAND 1.2 cm2), The peak pressure gradient is 27mmHg, mean pressure gradient is 16 mmHg, mild to moderate AR, moderate pericardial effusion noted. EF improved from prior. Echo reviewed w/ Dr. Jones.     Repeat Echo in 2 weeks w/ Dr. Jones    Chronic systolic congestive heart failure.    EF improved as per Echo above  No ACEI/ARB due to ACEI allergy and CKD.   Hold Lasix due to acute hypokalemia, acute hypocalcemia and acute hypomagnesemia, in Cr. from baseline on admission. Lungs CTA B/L, trace pedal edema. Pt to be discharged on Lasix 20mg once daily and KCl 10mequ daily as per Dr. Bryan and Dr. Jones. Pt will get follow up blood work as per Dr. Jones.     HTN  Cont. Coreg, Hydralazine. Restart Lasix 20mg once daily upon discharge.    Hypocalcemia.    Electrolyte imbalance in setting of Metabolic disorder as discussed w/ Dr. Hollins  Low electrolytes in setting on Lasix 40mg daily prior to admission as discussed w/ Dr. Bryan.   Ca < 5.0 on admission Repleted w/ 2 g IV Calcium Gluconate  x 2 this admission. Prolonged  repeat 452. Corrected Calcium 7.38 1/26. Cont. PO Calcium & Vitamin D per Dr. Bryan.  Vitamin D level 14.8 , PTH  131, AM Cortisol 13.1 .     Hypokalemia.    Resolved K now 4.3.     Hypomagnesemia.    Resolved. Mg now 2.0.    Dr. Hollins consulted   Possible adrenal insufficiency. AM Cortisol 13.1   Received Hydrocortisone 50mg IV x1on 1/25/17 and IV Hydrocortisone 25mg x1 in AM on 1/26/17  Cont. Prednisone 10mg daily as per Dr. Hollins  T3 low, TSH and T4 normal.     Tremor.   Resolved after electrolyte correction. Monitor for signs and symptoms of electrolyte disturbance: tremors, muscle cramping, confusion paresthesias, tingling in the lips and fingers, irritability, constipation, seizures, insomnia, muscle weakness    Neuro exam w/o any deficit other than known L eye blindness. Strength 5/5 in all extremities.     CKD (chronic kidney disease), stage IV.    Cr. 2.3-2.4 in ER on admission and now 2.2 (baseline Cr. 1.61-2.08 in 12/2016).   On Flomax (h/o urinary retention).   UA/Urine Electrolytes reviewed Urine Cx pending     Renal US showed increased echogenicity of the kidneys bilaterally consistent with chronic  medical renal disease. Small/atrophic left kidney. Mobile material is seen layering within the bladder which may represent  pus/infected urine or blood/hematuria. UA showed small leukocyte esterase, many WBCs.     Urology consulted Dr. Pisano consulted for Renal US findings  14Fr irby was inserted by , bladder was irrigated with NS, urine cloudy, no pus or hematuria. Irby cath was removed, per  no acute  intervention needed at this time. No mass identified on US. F/u urine culture. Can repeat renal bladder US as outpatient per . Continue Flomax upon discharge.     Asthma.    Cont. Xopenex, Advair, on Prednisone 10mg daily for RA (on Protonix for GI protection)    RA  Cont. Prednisone 10mg daily.     Chronic anemia.   H/H 10.5/35 on admission. H/H stable 9.0/26 today and 9.0/27 yesterday.   Iron deficiency anemia noted, Iron 15 started on ferrous sulfate 325 BID, Colace BID PRN constipation.    B12 elevated, folic acid normal. Stool occult ordered. Cont. folic acid, thiamine, B12 dose lowered to cyanocobalamin 500mcg daily.      H/o Short Gut Syndrome  Per daughter and pt, pt w/o recent constipation or diarrhea and no recent need for Loperamide or Colace.     DVT PPX: Intermittent Pneumatic Compression B/L LE     Code Status: DNR/DNI  DNR/DNI form signed in chart. Copy of MOLST form in chart. DNR/DNI order in sunrise.       Dispo: Pt refusing [facility] and will go home with daughterPascale. 83 y/o F DNR/DNI (MOLST form copy from Rehab in chart) ASA, Shellfish, ARB Allergy from [facility], w/ PMHX HTN, HLD, recent diagnosis of Takotsubo Cardiomyopathy/Systolic CHF EF 25-30% 12/2016, CKD stage 4 w/ h/o Urinary Retention, Asthma/COPD s/p recent exacerbation, Temporal Arteritis complicated by L eye legal blindness, SBO s/p bowel resection, RA, GERD, Chronic Anemia s/p prior blood transfusion, h/o hypocalcemia, h/o Asthma Exacerbation 2nd to +RSV in 12/2016, NSTEMI 12/2016 (medical management) who presented w/ C/P and acute hypokalemia, acute hypocalcemia and acute hypomagnesemia, worsening renal function      CAD (coronary artery disease).    C/P relieved w/ NTG in ER. H/o CAD. Peak Troponin 0.2, trended down to 0.07.    No ASA due to ASA allergy. No Plavix as per Dr. Jones due to h/o Anemia. Cont. Coreg, Lipitor  No further ischemia work-up at this time as discussed w/ Dr. Jones. Pt will get Echocardiogram in 2 weeks upon discharge.     Echo 1/24/16: mild concentric LVH. apical mild HK 55%, impaired left ventricular relaxation with mildly elevated left atrial pressure (8-14mmHg), Moderate AS (FERDINAND 1.2 cm2), The peak pressure gradient is 27mmHg, mean pressure gradient is 16 mmHg, mild to moderate AR, moderate pericardial effusion noted. EF improved from prior. Echo reviewed w/ Dr. Jones.     Repeat Echo in 2 weeks w/ Dr. Jones    Chronic systolic congestive heart failure.    EF improved as per Echo above  No ACEI/ARB due to ACEI allergy and CKD.   Hold Lasix due to acute hypokalemia, acute hypocalcemia and acute hypomagnesemia, in Cr. from baseline on admission. Lungs CTA B/L, trace pedal edema. Pt to be discharged on Lasix 20mg once daily and KCl 10mequ daily as per Dr. Bryan and Dr. Jones. Pt will get follow up blood work as per Dr. Jones.     HTN  Cont. Coreg, Hydralazine. Restart Lasix 20mg once daily upon discharge.    Hypocalcemia.    Electrolyte imbalance in setting of Metabolic disorder as discussed w/ Dr. Hollins  Low electrolytes in setting on Lasix 40mg daily prior to admission as discussed w/ Dr. Bryan.   Ca < 5.0 on admission Repleted w/ 2 g IV Calcium Gluconate  x 2 this admission. Prolonged  repeat 452. Corrected Calcium 7.38 1/26. Cont. PO Calcium & Vitamin D per Dr. Bryan.  Vitamin D level 14.8 , PTH  131, AM Cortisol 13.1 .     Hypokalemia.    Resolved K now 4.3.     Hypomagnesemia.    Resolved. Mg now 2.0.    Dr. Hollins consulted   Possible adrenal insufficiency. AM Cortisol 13.1   Received Hydrocortisone 50mg IV x1on 1/25/17 and IV Hydrocortisone 25mg x1 in AM on 1/26/17  Cont. Prednisone 10mg daily as per Dr. Hollins  T3 low, TSH and T4 normal.     Tremor.   Resolved after electrolyte correction. Monitor for signs and symptoms of electrolyte disturbance: tremors, muscle cramping, confusion paresthesias, tingling in the lips and fingers, irritability, constipation, seizures, insomnia, muscle weakness    Neuro exam w/o any deficit other than known L eye blindness. Strength 5/5 in all extremities.     CKD (chronic kidney disease), stage IV.    Cr. 2.3-2.4 in ER on admission and now 2.2 (baseline Cr. 1.61-2.08 in 12/2016).   On Flomax (h/o urinary retention).   UA/Urine Electrolytes reviewed Urine Cx pending     Renal US showed increased echogenicity of the kidneys bilaterally consistent with chronic  medical renal disease. Small/atrophic left kidney. Mobile material is seen layering within the bladder which may represent  pus/infected urine or blood/hematuria. UA showed small leukocyte esterase, many WBCs.     Urology consulted Dr. Pisano consulted for Renal US findings  14Fr irby was inserted by , bladder was irrigated with NS, urine cloudy, no pus or hematuria. Irby cath was removed, per  no acute  intervention needed at this time. No mass identified on US. F/u urine culture. Can repeat renal bladder US as outpatient per . Continue Flomax upon discharge.     Asthma.    Cont. Xopenex, Advair, on Prednisone 10mg daily for RA (on Protonix for GI protection)    RA  Cont. Prednisone 10mg daily.     Chronic anemia.   H/H 10.5/35 on admission. H/H stable in 9s. Hemoglobin dropped to   Iron deficiency anemia noted, Iron 15 started on ferrous sulfate 325 BID, Colace BID PRN constipation.    B12 elevated, folic acid normal. Stool occult ordered. Cont. folic acid, thiamine, B12 dose lowered to cyanocobalamin 500mcg daily.      H/o Short Gut Syndrome  Per daughter and pt, pt w/o recent constipation or diarrhea and no recent need for Loperamide or Colace.     DVT PPX: Intermittent Pneumatic Compression B/L LE     Code Status: DNR/DNI  DNR/DNI form signed in chart. Copy of MOLST form in chart. DNR/DNI order in sunrise.       Dispo: Pt refusing [facility] and will go home with daughterPascale. Home care consult requested. 83 y/o F DNR/DNI (MOLST form copy from Rehab in chart) ASA, Shellfish, ARB Allergy from [facility], w/ PMHX HTN, HLD, recent diagnosis of Takotsubo Cardiomyopathy/Systolic CHF EF 25-30% 12/2016, CKD stage 4 w/ h/o Urinary Retention, Asthma/COPD s/p recent exacerbation, Temporal Arteritis complicated by L eye legal blindness, SBO s/p bowel resection, RA, GERD, Chronic Anemia s/p prior blood transfusion, h/o hypocalcemia, h/o Asthma Exacerbation 2nd to +RSV in 12/2016, NSTEMI 12/2016 (medical management) who presented w/ C/P and acute hypokalemia, acute hypocalcemia and acute hypomagnesemia, worsening renal function      CAD (coronary artery disease).    C/P relieved w/ NTG in ER. H/o CAD. Peak Troponin 0.2, trended down to 0.07.    No ASA due to ASA allergy. No Plavix as per Dr. Jones due to h/o Anemia. Cont. Coreg, Lipitor  No further ischemia work-up at this time as discussed w/ Dr. Jones. Pt will get Echocardiogram in 2 weeks upon discharge.     Echo 1/24/16: mild concentric LVH. apical mild HK 55%, impaired left ventricular relaxation with mildly elevated left atrial pressure (8-14mmHg), Moderate AS (FERDINAND 1.2 cm2), The peak pressure gradient is 27mmHg, mean pressure gradient is 16 mmHg, mild to moderate AR, moderate pericardial effusion noted. EF improved from prior. Echo reviewed w/ Dr. Jones.     Repeat Echo in 2 weeks w/ Dr. Jones    Chronic systolic congestive heart failure.    EF improved as per Echo above  No ACEI/ARB due to ACEI allergy and CKD.   Hold Lasix due to acute hypokalemia, acute hypocalcemia and acute hypomagnesemia, in Cr. from baseline on admission. Lungs CTA B/L, trace pedal edema. Pt to be discharged on Lasix 20mg once daily and KCl 10mequ daily as per Dr. Bryan and Dr. Jones. Pt will get follow up blood work as per Dr. Jones.     HTN  Cont. Coreg, Hydralazine. Restart Lasix 20mg once daily upon discharge.    Hypocalcemia.    Electrolyte imbalance in setting of Metabolic disorder as discussed w/ Dr. Hollins  Low electrolytes in setting on Lasix 40mg daily prior to admission as discussed w/ Dr. Bryan.   Ca < 5.0 on admission Repleted w/ 2 g IV Calcium Gluconate  x 2 this admission. Prolonged  repeat 452. Corrected Calcium 7.38 1/26. Cont. PO Calcium & Vitamin D per Dr. Bryan.  Vitamin D level 14.8 , PTH  131, AM Cortisol 13.1 .     Hypokalemia.    Resolved K now 4.3.     Hypomagnesemia.    Resolved. Mg now 2.0.    Dr. Hollins consulted   Possible adrenal insufficiency. AM Cortisol 13.1   Received Hydrocortisone 50mg IV x1on 1/25/17 and IV Hydrocortisone 25mg x1 in AM on 1/26/17  Cont. Prednisone 10mg daily as per Dr. Hollins  T3 low, TSH and T4 normal.     Tremor.   Resolved after electrolyte correction. Monitor for signs and symptoms of electrolyte disturbance: tremors, muscle cramping, confusion paresthesias, tingling in the lips and fingers, irritability, constipation, seizures, insomnia, muscle weakness    Neuro exam w/o any deficit other than known L eye blindness. Strength 5/5 in all extremities.     CKD (chronic kidney disease), stage IV.    Cr. 2.3-2.4 in ER on admission and now 2.2 (baseline Cr. 1.61-2.08 in 12/2016).   On Flomax (h/o urinary retention).   UA/Urine Electrolytes reviewed Urine Cx pending     Renal US showed increased echogenicity of the kidneys bilaterally consistent with chronic  medical renal disease. Small/atrophic left kidney. Mobile material is seen layering within the bladder which may represent  pus/infected urine or blood/hematuria. UA showed small leukocyte esterase, many WBCs.     Urology consulted Dr. Pisano consulted for Renal US findings  14Fr irby was inserted by , bladder was irrigated with NS, urine cloudy, no pus or hematuria. Irby cath was removed, per  no acute  intervention needed at this time. No mass identified on US. F/u urine culture. Can repeat renal bladder US as outpatient per . Continue Flomax upon discharge.     Asthma.    Cont. Xopenex, Advair, on Prednisone 10mg daily for RA (on Protonix for GI protection)    RA  Cont. Prednisone 10mg daily.     Chronic anemia.   H/H 10.5/35 on admission. H/H stable in 9s. Hemoglobin dropped to   Iron deficiency anemia noted, Iron 15 started on ferrous sulfate 325 BID, Colace BID PRN constipation.    B12 elevated, folic acid normal. Stool occult ordered. Cont. folic acid, thiamine, B12 dose lowered to cyanocobalamin 500mcg daily.      H/o Short Gut Syndrome  Per daughter and pt, pt w/o recent constipation or diarrhea and no recent need for Loperamide or Colace.     DVT PPX: Intermittent Pneumatic Compression B/L LE     Code Status: DNR/DNI  DNR/DNI form signed in chart. Copy of MOLST form in chart. DNR/DNI order in sunrise.       Dispo: Pt refusing [facility] and will go home with daughterPascale. Home care consult requested. All prescriptions sent to [facility] [address] [place] [address]. [phone] called and the pharmacist stated that Cyanocobalamin 1000mcg once daily, Lasix 20mg once daily, and KCl 10mequ daily were successfully eprescribed and received even though it says the esubmit failed in [place]. 85 y/o F DNR/DNI (MOLST form copy from Rehab in chart) ASA, Shellfish, ARB Allergy from [facility], w/ PMHX HTN, HLD, recent diagnosis of Takotsubo Cardiomyopathy/Systolic CHF EF 25-30% 12/2016, CKD stage 4 w/ h/o Urinary Retention, Asthma/COPD s/p recent exacerbation, Temporal Arteritis complicated by L eye legal blindness, SBO s/p bowel resection, RA, GERD, Chronic Anemia s/p prior blood transfusion, h/o hypocalcemia, h/o Asthma Exacerbation 2nd to +RSV in 12/2016, NSTEMI 12/2016 (medical management) who presented w/ C/P and acute hypokalemia, acute hypocalcemia and acute hypomagnesemia, worsening renal function      CAD (coronary artery disease).    C/P relieved w/ NTG in ER. H/o CAD. Peak Troponin 0.2, trended down to 0.07.    No ASA due to ASA allergy. No Plavix as per Dr. Jones due to h/o Anemia. Cont. Coreg, Lipitor  No further ischemia work-up at this time as discussed w/ Dr. Jones. Pt will get Echocardiogram in 2 weeks upon discharge.     Echo 1/24/16: mild concentric LVH. apical mild HK 55%, impaired left ventricular relaxation with mildly elevated left atrial pressure (8-14mmHg), Moderate AS (FERDINAND 1.2 cm2), The peak pressure gradient is 27mmHg, mean pressure gradient is 16 mmHg, mild to moderate AR, moderate pericardial effusion noted. EF improved from prior. Echo reviewed w/ Dr. Jones.     Repeat Echo in 2 weeks w/ Dr. Jones    Chronic systolic congestive heart failure.    EF improved as per Echo above  No ACEI/ARB due to ACEI allergy and CKD.   Hold Lasix due to acute hypokalemia, acute hypocalcemia and acute hypomagnesemia, in Cr. from baseline on admission. Lungs CTA B/L, trace pedal edema. Pt to be discharged on Lasix 20mg once daily and KCl 10mequ daily as per Dr. Bryan and Dr. Jones. Pt will get follow up blood work as per Dr. Jones.     HTN  Cont. Coreg, Hydralazine. Restart Lasix 20mg once daily upon discharge.    Hypocalcemia.    Electrolyte imbalance in setting of Metabolic disorder as discussed w/ Dr. Hollins  Low electrolytes in setting on Lasix 40mg daily prior to admission as discussed w/ Dr. Bryan.   Ca < 5.0 on admission Repleted w/ 2 g IV Calcium Gluconate  x 2 this admission. Prolonged  repeat 452. Corrected Calcium 7.38 1/26. Cont. PO Calcium & Vitamin D per Dr. Bryan.  Vitamin D level 14.8 , PTH  131, AM Cortisol 13.1 .     Hypokalemia.    Resolved K now 4.3.     Hypomagnesemia.    Resolved. Mg now 2.0.    Dr. Hollins consulted   Possible adrenal insufficiency. AM Cortisol 13.1   Received Hydrocortisone 50mg IV x1on 1/25/17 and IV Hydrocortisone 25mg x1 in AM on 1/26/17  Cont. Prednisone 10mg daily as per Dr. Hollins  T3 low, TSH and T4 normal.     Tremor.   Resolved after electrolyte correction. Monitor for signs and symptoms of electrolyte disturbance: tremors, muscle cramping, confusion paresthesias, tingling in the lips and fingers, irritability, constipation, seizures, insomnia, muscle weakness    Neuro exam w/o any deficit other than known L eye blindness. Strength 5/5 in all extremities.     CKD (chronic kidney disease), stage IV.    Cr. 2.3-2.4 in ER on admission and now 2.2 (baseline Cr. 1.61-2.08 in 12/2016).   On Flomax (h/o urinary retention).   UA/Urine Electrolytes reviewed Urine Cx pending     Renal US showed increased echogenicity of the kidneys bilaterally consistent with chronic  medical renal disease. Small/atrophic left kidney. Mobile material is seen layering within the bladder which may represent  pus/infected urine or blood/hematuria. UA showed small leukocyte esterase, many WBCs.     Urology consulted Dr. Pisano consulted for Renal US findings  14Fr irby was inserted by , bladder was irrigated with NS, urine cloudy, no pus or hematuria. Irby cath was removed, per  no acute  intervention needed at this time. No mass identified on US. F/u urine culture. Can repeat renal bladder US as outpatient per . Continue Flomax upon discharge.     Asthma.    Cont. Xopenex, Advair, on Prednisone 10mg daily for RA (on Protonix for GI protection)    RA  Cont. Prednisone 10mg daily.     Chronic anemia.   H/H 10.5/35 on admission. H/H stable in 9s. Hemoglobin dropped to 8.2. Dr. Jones aware  Iron deficiency anemia noted, Iron 15 started on ferrous sulfate 325 BID, Colace BID PRN constipation.    B12 elevated, folic acid normal. Stool occult ordered. Cont. folic acid, thiamine, B12 dose lowered to cyanocobalamin 500mcg daily.      H/o Short Gut Syndrome  Per daughter and pt, pt w/o recent constipation or diarrhea and no recent need for Loperamide or Colace.     DVT PPX: Intermittent Pneumatic Compression B/L LE     Code Status: DNR/DNI  DNR/DNI form signed in chart. Copy of MOLST form in chart. DNR/DNI order in sunrise.       Dispo: Pt refusing [facility] and will go home with daughterPascale. Home care consult requested. All prescriptions sent to [facility] [address] [place] [address]. [phone] called and the pharmacist stated that Cyanocobalamin 1000mcg once daily, Lasix 20mg once daily, and KCl 10mequ daily were successfully eprescribed and received even though it says the esubmit failed in [place].

## 2017-01-27 NOTE — DISCHARGE NOTE ADULT - HOME CARE AGENCY
[facility] tel  (RN start of care 1/28/17 requested, HIP insurance need to authorize), Other services requested: PT, Home aide

## 2017-01-27 NOTE — DISCHARGE NOTE ADULT - CARE PROVIDERS DIRECT ADDRESSES
[email or web address]

## 2017-01-27 NOTE — DISCHARGE NOTE ADULT - MEDICATION SUMMARY - MEDICATIONS TO TAKE
I will START or STAY ON the medications listed below when I get home from the hospital:    predniSONE 10 mg oral tablet  -- 1 tab(s) by mouth once a day  -- Indication: For Adrenal insufficiency    acetaminophen 325 mg oral tablet  -- 2 tab(s) by mouth every 6 hours, As needed, Moderate Pain (4 - 6)  -- Indication: For Pain    tamsulosin 0.4 mg oral capsule  -- 1 cap(s) by mouth once a day (at bedtime)  -- Indication: For Urinary retention    loratadine 10 mg oral tablet  -- 1 tab(s) by mouth once a day  -- Indication: For Asthma    Lipitor 40 mg oral tablet  -- 1 tab(s) by mouth once a day  -- Indication: For Cholesterol Control    Coreg 6.25 mg oral tablet  -- 1 tab(s) by mouth 2 times a day  -- Indication: For Blood pressure control    Advair Diskus 250 mcg-50 mcg inhalation powder  -- 1 puff(s) inhaled 2 times a day  -- Indication: For Asthma    levalbuterol 0.63 mg/3 mL inhalation solution  -- 3 milliliter(s) inhaled every 4 hours, As needed, shortness of breath  -- Indication: For Asthma    Lasix 20 mg oral tablet  -- 1 tab(s) by mouth once a day  -- Avoid prolonged or excessive exposure to direct and/or artificial sunlight while taking this medication.  It is very important that you take or use this exactly as directed.  Do not skip doses or discontinue unless directed by your doctor.  It may be advisable to drink a full glass orange juice or eat a banana daily while taking this medication.    -- Indication: For Congestive Heart Failure    potassium chloride 10 mEq oral capsule, extended release  -- 1 cap(s) by mouth 2 times a day  -- It is very important that you take or use this exactly as directed.  Do not skip doses or discontinue unless directed by your doctor.  Medication should be taken with plenty of water.  Swallow whole.  Do not crush.  Take with food or milk.    -- Indication: For Potassium repletion    zinc (as gluconate) 100 mg oral tablet  -- 1 tab(s) by mouth once a day  -- Indication: For Zinc repletion    pantoprazole 40 mg oral delayed release tablet  -- 1 tab(s) by mouth once a day (before a meal)  -- Indication: For Stomach protection/GERD    hydrALAZINE 50 mg oral tablet  -- 1 tab(s) by mouth 3 times a day  -- Indication: For Blood pressure control    Multiple Vitamins oral tablet  -- 1 tab(s) by mouth once a day  -- Indication: For Multivitamin    Calcium 500+D  -- 1 tab(s) by mouth 2 times a day  -- Indication: For CAlcium repletion    cyanocobalamin 1000 mcg oral tablet  -- 1 tab(s) by mouth once a day  -- Indication: For Vitamin B12 repletion    Vitamin D3 400 intl units oral capsule  -- 1 cap(s) by mouth once a day  -- Indication: For Vitamin D repletion    folic acid 1 mg oral tablet  -- 1 tab(s) by mouth once a day  -- Indication: For Folic Acid repletion    thiamine 100 mg oral tablet  -- 1 tab(s) by mouth once a day  -- Indication: For Thiamine repletion I will START or STAY ON the medications listed below when I get home from the hospital:    predniSONE 10 mg oral tablet  -- 1 tab(s) by mouth once a day  -- Indication: For Cortisol deficiency    acetaminophen 325 mg oral tablet  -- 2 tab(s) by mouth every 6 hours, As needed, Moderate Pain (4 - 6)  -- Indication: For Pain    tamsulosin 0.4 mg oral capsule  -- 1 cap(s) by mouth once a day (at bedtime)  -- Indication: For Urinary retention    loratadine 10 mg oral tablet  -- 1 tab(s) by mouth once a day  -- Indication: For Asthma    Lipitor 40 mg oral tablet  -- 1 tab(s) by mouth once a day  -- Indication: For Cholesterol control    Coreg 6.25 mg oral tablet  -- 1 tab(s) by mouth 2 times a day  -- Indication: For Blood pressure control/CHF    Advair Diskus 250 mcg-50 mcg inhalation powder  -- 1 puff(s) inhaled 2 times a day  -- Indication: For Asthma    levalbuterol 0.63 mg/3 mL inhalation solution  -- 3 milliliter(s) inhaled every 4 hours, As needed, shortness of breath  -- Indication: For Asthma    Lasix 20 mg oral tablet  -- 1 tab(s) by mouth once a day  -- Avoid prolonged or excessive exposure to direct and/or artificial sunlight while taking this medication.  It is very important that you take or use this exactly as directed.  Do not skip doses or discontinue unless directed by your doctor.  It may be advisable to drink a full glass orange juice or eat a banana daily while taking this medication.    -- Indication: For Congestive Heart Failure    potassium chloride 10 mEq oral capsule, extended release  -- 1 cap(s) by mouth 2 times a day  -- It is very important that you take or use this exactly as directed.  Do not skip doses or discontinue unless directed by your doctor.  Medication should be taken with plenty of water.  Swallow whole.  Do not crush.  Take with food or milk.    -- Indication: For Potassium control    zinc (as gluconate) 100 mg oral tablet  -- 1 tab(s) by mouth once a day  -- Indication: For Zinc control    pantoprazole 40 mg oral delayed release tablet  -- 1 tab(s) by mouth once a day (before a meal)  -- Indication: For Stomach Control/GERD    hydrALAZINE 50 mg oral tablet  -- 1 tab(s) by mouth 3 times a day  -- Indication: For Blood pressure control/Congestive heart failure     Multiple Vitamins oral tablet  -- 1 tab(s) by mouth once a day  -- Indication: For Multivitamin    Oysco 500 with D 500 mg-200 intl units oral tablet  -- 1 tab(s) by mouth 2 times a day  -- Indication: For Calcium control    cyanocobalamin 1000 mcg oral tablet  -- 1 tab(s) by mouth once a day  -- Indication: For  Vitamin B12 control    Vitamin D3 400 intl units oral capsule  -- 1 cap(s) by mouth once a day  -- Indication: For Vitamin D control    folic acid 1 mg oral tablet  -- 1 tab(s) by mouth once a day  -- Indication: For Folic Acid control    thiamine 100 mg oral tablet  -- 1 tab(s) by mouth once a day  -- Indication: For Thiamine control I will START or STAY ON the medications listed below when I get home from the hospital:    predniSONE 10 mg oral tablet  -- 1 tab(s) by mouth once a day  -- Indication: For Cortisol deficiency    acetaminophen 325 mg oral tablet  -- 2 tab(s) by mouth every 6 hours, As needed, Moderate Pain (4 - 6)  -- Indication: For Pain    tamsulosin 0.4 mg oral capsule  -- 1 cap(s) by mouth once a day (at bedtime)  -- Indication: For Urinary retention    loratadine 10 mg oral tablet  -- 1 tab(s) by mouth once a day  -- Indication: For Asthma    Lipitor 40 mg oral tablet  -- 1 tab(s) by mouth once a day  -- Indication: For Cholesterol control    Coreg 6.25 mg oral tablet  -- 1 tab(s) by mouth 2 times a day  -- Indication: For Blood pressure control    Advair Diskus 250 mcg-50 mcg inhalation powder  -- 1 puff(s) inhaled 2 times a day  -- Indication: For Asthma    levalbuterol 0.63 mg/3 mL inhalation solution  -- 3 milliliter(s) inhaled every 4 hours, As needed, shortness of breath  -- Indication: For Asthma    Lasix 20 mg oral tablet  -- 1 tab(s) by mouth once a day  -- Avoid prolonged or excessive exposure to direct and/or artificial sunlight while taking this medication.  It is very important that you take or use this exactly as directed.  Do not skip doses or discontinue unless directed by your doctor.  It may be advisable to drink a full glass orange juice or eat a banana daily while taking this medication.    -- Indication: For Congestive Heart Failure    ferrous sulfate 325 mg oral tablet  -- 1 tab(s) by mouth 2 times a day  -- Check with your doctor before becoming pregnant.  Do not chew, break, or crush.  May discolor urine or feces.    -- Indication: For Iron deficiency anemia    potassium chloride 10 mEq oral capsule, extended release  -- 1 cap(s) by mouth 2 times a day  -- It is very important that you take or use this exactly as directed.  Do not skip doses or discontinue unless directed by your doctor.  Medication should be taken with plenty of water.  Swallow whole.  Do not crush.  Take with food or milk.    -- Indication: For Potassium control    zinc (as gluconate) 100 mg oral tablet  -- 1 tab(s) by mouth once a day  -- Indication: For Zinc balance    pantoprazole 40 mg oral delayed release tablet  -- 1 tab(s) by mouth once a day (before a meal)  -- Indication: For Stomach acid control/GERD    hydrALAZINE 50 mg oral tablet  -- 1 tab(s) by mouth 3 times a day  -- Indication: For Blood pressure control/Congestive heart failure     Multiple Vitamins oral tablet  -- 1 tab(s) by mouth once a day  -- Indication: For Multivitamin    Oysco 500 with D 500 mg-200 intl units oral tablet  -- 1 tab(s) by mouth 2 times a day  -- Indication: For Calcium control    cyanocobalamin 1000 mcg oral tablet  -- 1 tab(s) by mouth once a day  -- Indication: For  Vitamin B12 control    Vitamin D3 400 intl units oral capsule  -- 1 cap(s) by mouth once a day  -- Indication: For Vitamin D control    folic acid 1 mg oral tablet  -- 1 tab(s) by mouth once a day  -- Indication: For Folic acid control    thiamine 100 mg oral tablet  -- 1 tab(s) by mouth once a day  -- Indication: For Thiamine control

## 2017-01-27 NOTE — PROGRESS NOTE ADULT - SUBJECTIVE AND OBJECTIVE BOX
INTERVAL HPI/OVERNIGHT EVENTS:      Patient is a 84y old  Female who presents with a chief complaint of Cortisol deficiency (01-27-17)   was seen and examined today. Reports the following symptoms:    CONSTITUTIONAL:  Negative fever or chills, feels well, good appetite  EYES:  Negative  blurry vision or double vision  CARDIOVASCULAR:  Negative for chest pain or palpitations  RESPIRATORY:  Negative for cough, wheezing, or SOB   GASTROINTESTINAL:  Negative for nausea, vomiting, diarrhea, constipation, or abdominal pain  GENITOURINARY:  Negative frequency, urgency or dysuria  NEUROLOGIC:  No headache, confusion, dizziness, lightheadedness    MEDICATIONS  (STANDING):  predniSONE   Tablet 10milliGRAM(s) Oral daily  tamsulosin 0.4milliGRAM(s) Oral at bedtime  loratadine 10milliGRAM(s) Oral daily  atorvastatin 40milliGRAM(s) Oral at bedtime  fluticasone / salmeterol 250-50 MICROgram(s) Diskus 1Dose(s) Inhalation two times a day  zinc gluconate 100milliGRAM(s) Oral daily  pantoprazole    Tablet 40milliGRAM(s) Oral before breakfast  hydrALAZINE 50milliGRAM(s) Oral every 8 hours  calcium carbonate 1250 mG + Vitamin D (OsCal 500 + D) 1Tablet(s) Oral two times a day  multivitamin 1Tablet(s) Oral daily  folic acid 1milliGRAM(s) Oral daily  thiamine 100milliGRAM(s) Oral daily  cholecalciferol 400Unit(s) Oral daily  ferrous    sulfate 325milliGRAM(s) Oral two times a day with meals  cyanocobalamin 500MICROGram(s) Oral daily  carvedilol 6.25milliGRAM(s) Oral every 12 hours    MEDICATIONS  (PRN):  levalbuterol Inhalation 0.63milliGRAM(s) Inhalation every 4 hours PRN sob  acetaminophen   Tablet 650milliGRAM(s) Oral every 6 hours PRN Moderate Pain (4 - 6)  docusate sodium 100milliGRAM(s) Oral two times a day PRN Constipation        LABS:                        8.2    5.6   )-----------( 120      ( 27 Jan 2017 06:15 )             24.7     27 Jan 2017 06:15    142    |  109    |  47     ----------------------------<  85     3.8     |  22     |  2.22     Ca    6.4        27 Jan 2017 06:15  Mg     1.9       27 Jan 2017 06:15    TPro  5.3    /  Alb  2.2    /  TBili  0.9    /  DBili  x      /  AST  13     /  ALT  17     /  AlkPhos  56     26 Jan 2017 06:19          Thyroid Stimulating Hormone, Serum: 1.606 uIU/mL (01-25 @ 06:20)  Thyroid Stimulating Hormone, Serum: 1.540 uIU/mL (12-04 @ 06:46)      RADIOLOGY & ADDITIONAL TESTS:      Vital Signs Last 24 Hrs  T(C): 36.8, Max: 36.8 (01-27 @ 12:39)  T(F): 98.3, Max: 98.3 (01-27 @ 12:39)  HR: 71 (62 - 71)  BP: 141/65 (124/59 - 157/67)  BP(mean): --  RR: 16 (16 - 16)  SpO2: 99% (98% - 100%)    CAPILLARY BLOOD GLUCOSE      PHYSICAL EXAM:  Constitutional: wn/wd in NAD.   HEENT: NCAT, MMM, OP clear, EOMI, , no proptosis or lid retraction  Neck: supple, no acanthosis, no thyromegaly or palpable thyroid nodules   Respiratory: Lungs CTAB.  Cardiovascular: regular rhythm, normal S1 and S2, no audible murmurs, no peripheral edema  GI: soft, + bowel sounds, NT/ND, no masses/HSM appreciated.  Neurology: no tremors, DTR 2+  Skin: no visible rashes/lesions  Psychiatric: AAO x 3, normal affect/mood.        A/P: 84yFemale admitted for (    ). Consulted and being followed for Diabetes Type (  ).       Uncontrolled DM Type (  ) -     Please continue           units lantus AM/PM, premeal Lispro  (  ) units TID, and  Lispro moderate/low dose sliding scale 4 times daily (before meals and bedtime).  Please continue consistent carbohydrate (Diabetic) diet, FS ac & hs. Target  - 150.      Case discussed with attending and primary team      Attending attestation:  I have seen and evaluated the patient at the bedside.   Endocrine Nurse Practitioner/Fellow/Resident’s note reviewed, including vital signs, PE and laboratory results.  Direct personal management and extensive interpretation of the data conducted.   I agree with the Nurse Practitioner/Fellow/Resident’s assessment and recommendations as above. INTERVAL HPI/OVERNIGHT EVENTS:      Patient is a 84y old  Female who presents with a chief complaint of Cortisol deficiency (01-27-17), hypocalcemia   was seen and examined today. Reports the following symptoms:    CONSTITUTIONAL:  Negative fever or chills, feels well, good appetite  EYES:  Negative  blurry vision or double vision  CARDIOVASCULAR:  Negative for chest pain or palpitations  RESPIRATORY:  Negative for cough, wheezing, or SOB   GASTROINTESTINAL:  Negative for nausea, vomiting, diarrhea, constipation, or abdominal pain  GENITOURINARY:  Negative frequency, urgency or dysuria  NEUROLOGIC:  No headache, confusion, dizziness, lightheadedness    MEDICATIONS  (STANDING):  predniSONE   Tablet 10milliGRAM(s) Oral daily  tamsulosin 0.4milliGRAM(s) Oral at bedtime  loratadine 10milliGRAM(s) Oral daily  atorvastatin 40milliGRAM(s) Oral at bedtime  fluticasone / salmeterol 250-50 MICROgram(s) Diskus 1Dose(s) Inhalation two times a day  zinc gluconate 100milliGRAM(s) Oral daily  pantoprazole    Tablet 40milliGRAM(s) Oral before breakfast  hydrALAZINE 50milliGRAM(s) Oral every 8 hours  calcium carbonate 1250 mG + Vitamin D (OsCal 500 + D) 1Tablet(s) Oral two times a day  multivitamin 1Tablet(s) Oral daily  folic acid 1milliGRAM(s) Oral daily  thiamine 100milliGRAM(s) Oral daily  cholecalciferol 400Unit(s) Oral daily  ferrous    sulfate 325milliGRAM(s) Oral two times a day with meals  cyanocobalamin 500MICROGram(s) Oral daily  carvedilol 6.25milliGRAM(s) Oral every 12 hours    MEDICATIONS  (PRN):  levalbuterol Inhalation 0.63milliGRAM(s) Inhalation every 4 hours PRN sob  acetaminophen   Tablet 650milliGRAM(s) Oral every 6 hours PRN Moderate Pain (4 - 6)  docusate sodium 100milliGRAM(s) Oral two times a day PRN Constipation        LABS:                        8.2    5.6   )-----------( 120      ( 27 Jan 2017 06:15 )             24.7     27 Jan 2017 06:15    142    |  109    |  47     ----------------------------<  85     3.8     |  22     |  2.22     Ca    6.4        27 Jan 2017 06:15  Mg     1.9       27 Jan 2017 06:15    TPro  5.3    /  Alb  2.2    /  TBili  0.9    /  DBili  x      /  AST  13     /  ALT  17     /  AlkPhos  56     26 Jan 2017 06:19          Thyroid Stimulating Hormone, Serum: 1.606 uIU/mL (01-25 @ 06:20)  Thyroid Stimulating Hormone, Serum: 1.540 uIU/mL (12-04 @ 06:46)      RADIOLOGY & ADDITIONAL TESTS:      Vital Signs Last 24 Hrs  T(C): 36.8, Max: 36.8 (01-27 @ 12:39)  T(F): 98.3, Max: 98.3 (01-27 @ 12:39)  HR: 71 (62 - 71)  BP: 141/65 (124/59 - 157/67)  BP(mean): --  RR: 16 (16 - 16)  SpO2: 99% (98% - 100%)    CAPILLARY BLOOD GLUCOSE      PHYSICAL EXAM:  Constitutional:  Elderly female in NAD.   HEENT: NCAT, MMM, OP clear, EOMI, no proptosis or lid retraction  Neck: supple, no acanthosis, no thyromegaly or palpable thyroid nodules   Respiratory: Lungs CTAB.  Cardiovascular: regular rhythm, normal S1 and S2, no audible murmurs, no peripheral edema  GI: soft, + bowel sounds, NT/ND  Neurology: no tremors, DTR 2+  Skin: no visible rashes/lesions  Psychiatric: AAO x 3, normal affect/mood.        A/P: 84y Female admitted for hypocalcemia.  Consulted and being followed for adrenal insufficiency.    Adrenal insufficiency being followed with blood pressure monitoring and electrolytes.  Two iv hydrocortisone doses given and prednisone ordered daily.  REC:  Monitor Calcium, albumin, magnesium, phosphorus  Continue calcium oral supplementation daily  Prednisone 10 mg po qAM with food for two weeks and taper.  F/U in endocrine practice, Dr. Hollins, tel: [phone]  Case discussed with physician assistant and primary team.    Attending attestation:  I have seen and evaluated the patient at the bedside.  Physician Assistant/Fellow/Resident’s note reviewed, including vital signs, PE and laboratory results.  Direct personal management and extensive interpretation of the data conducted.   Assessment and recommendations as above. INTERVAL HPI/OVERNIGHT EVENTS:      Patient is a 84y old  Female who presents with a chief complaint of Cortisol deficiency (01-27-17), hypocalcemia was seen and examined today. Reports the following symptoms:    CONSTITUTIONAL:  Negative fever or chills, feels well, good appetite  EYES:  Negative  blurry vision or double vision  CARDIOVASCULAR:  Negative for chest pain or palpitations  RESPIRATORY:  Negative for cough, wheezing, or SOB   GASTROINTESTINAL:  Negative for nausea, vomiting, diarrhea, constipation, or abdominal pain  GENITOURINARY:  Negative frequency, urgency or dysuria  NEUROLOGIC:  No headache, confusion, dizziness, lightheadedness    MEDICATIONS  (STANDING):  predniSONE   Tablet 10milliGRAM(s) Oral daily  tamsulosin 0.4milliGRAM(s) Oral at bedtime  loratadine 10milliGRAM(s) Oral daily  atorvastatin 40milliGRAM(s) Oral at bedtime  fluticasone / salmeterol 250-50 MICROgram(s) Diskus 1Dose(s) Inhalation two times a day  zinc gluconate 100milliGRAM(s) Oral daily  pantoprazole    Tablet 40milliGRAM(s) Oral before breakfast  hydrALAZINE 50milliGRAM(s) Oral every 8 hours  calcium carbonate 1250 mG + Vitamin D (OsCal 500 + D) 1Tablet(s) Oral two times a day  multivitamin 1Tablet(s) Oral daily  folic acid 1milliGRAM(s) Oral daily  thiamine 100milliGRAM(s) Oral daily  cholecalciferol 400Unit(s) Oral daily  ferrous    sulfate 325milliGRAM(s) Oral two times a day with meals  cyanocobalamin 500MICROGram(s) Oral daily  carvedilol 6.25milliGRAM(s) Oral every 12 hours    MEDICATIONS  (PRN):  levalbuterol Inhalation 0.63milliGRAM(s) Inhalation every 4 hours PRN sob  acetaminophen   Tablet 650milliGRAM(s) Oral every 6 hours PRN Moderate Pain (4 - 6)  docusate sodium 100milliGRAM(s) Oral two times a day PRN Constipation        LABS:                        8.2    5.6   )-----------( 120      ( 27 Jan 2017 06:15 )             24.7     27 Jan 2017 06:15    142    |  109    |  47     ----------------------------<  85     3.8     |  22     |  2.22     Ca    6.4        27 Jan 2017 06:15  Mg     1.9       27 Jan 2017 06:15    TPro  5.3    /  Alb  2.2    /  TBili  0.9    /  DBili  x      /  AST  13     /  ALT  17     /  AlkPhos  56     26 Jan 2017 06:19          Thyroid Stimulating Hormone, Serum: 1.606 uIU/mL (01-25 @ 06:20)  Thyroid Stimulating Hormone, Serum: 1.540 uIU/mL (12-04 @ 06:46)      RADIOLOGY & ADDITIONAL TESTS:      Vital Signs Last 24 Hrs  T(C): 36.8, Max: 36.8 (01-27 @ 12:39)  T(F): 98.3, Max: 98.3 (01-27 @ 12:39)  HR: 71 (62 - 71)  BP: 141/65 (124/59 - 157/67)  BP(mean): --  RR: 16 (16 - 16)  SpO2: 99% (98% - 100%)    CAPILLARY BLOOD GLUCOSE      PHYSICAL EXAM:  Constitutional:  Elderly female in NAD.   HEENT: NCAT, MMM, OP clear, EOMI, no proptosis or lid retraction  Neck: supple, no acanthosis, no thyromegaly or palpable thyroid nodules   Respiratory: Lungs CTAB.  Cardiovascular: regular rhythm, normal S1 and S2, no audible murmurs, no peripheral edema  GI: soft, + bowel sounds, NT/ND  Neurology: no tremors, DTR 2+  Skin: no visible rashes/lesions  Psychiatric: AAO x 3, normal affect/mood.        A/P: 84y Female admitted for hypocalcemia.  Consulted and being followed for adrenal insufficiency.    Adrenal insufficiency being followed with blood pressure monitoring and electrolytes.  Two iv hydrocortisone doses given and prednisone ordered daily.  REC:  Monitor Calcium, albumin, magnesium, phosphorus  Continue calcium oral supplementation daily  Prednisone 10 mg po qAM with food for two weeks and taper.  F/U in endocrine practice, Dr. Hollins, tel: [phone]  Case discussed with physician assistant and primary team.    Attending attestation:  I have seen and evaluated the patient at the bedside.  Physician Assistant/Fellow/Resident’s note reviewed, including vital signs, PE and laboratory results.  Direct personal management and extensive interpretation of the data conducted.   Assessment and recommendations as above.

## 2017-01-29 LAB
-  AMPICILLIN/SULBACTAM: SIGNIFICANT CHANGE UP
-  AMPICILLIN: SIGNIFICANT CHANGE UP
-  CEFAZOLIN: SIGNIFICANT CHANGE UP
-  CEFTRIAXONE: SIGNIFICANT CHANGE UP
-  CIPROFLOXACIN: SIGNIFICANT CHANGE UP
-  GENTAMICIN: SIGNIFICANT CHANGE UP
-  NITROFURANTOIN: SIGNIFICANT CHANGE UP
-  PIPERACILLIN/TAZOBACTAM: SIGNIFICANT CHANGE UP
-  TOBRAMYCIN: SIGNIFICANT CHANGE UP
-  TRIMETHOPRIM/SULFAMETHOXAZOLE: SIGNIFICANT CHANGE UP
CULTURE RESULTS: SIGNIFICANT CHANGE UP
METHOD TYPE: SIGNIFICANT CHANGE UP
ORGANISM # SPEC MICROSCOPIC CNT: SIGNIFICANT CHANGE UP
ORGANISM # SPEC MICROSCOPIC CNT: SIGNIFICANT CHANGE UP
SPECIMEN SOURCE: SIGNIFICANT CHANGE UP

## 2017-01-30 LAB
VIT A SERPL-MCNC: 55 UG/DL — SIGNIFICANT CHANGE UP (ref 26–82)
VIT B1 SERPL-MCNC: 161.8 NMOL/L — SIGNIFICANT CHANGE UP (ref 66.5–200)

## 2017-01-31 DIAGNOSIS — J44.9 CHRONIC OBSTRUCTIVE PULMONARY DISEASE, UNSPECIFIED: ICD-10-CM

## 2017-01-31 DIAGNOSIS — R33.9 RETENTION OF URINE, UNSPECIFIED: ICD-10-CM

## 2017-01-31 DIAGNOSIS — N17.9 ACUTE KIDNEY FAILURE, UNSPECIFIED: ICD-10-CM

## 2017-01-31 DIAGNOSIS — K21.9 GASTRO-ESOPHAGEAL REFLUX DISEASE WITHOUT ESOPHAGITIS: ICD-10-CM

## 2017-01-31 DIAGNOSIS — E83.51 HYPOCALCEMIA: ICD-10-CM

## 2017-01-31 DIAGNOSIS — H54.42 BLINDNESS, LEFT EYE, NORMAL VISION RIGHT EYE: ICD-10-CM

## 2017-01-31 DIAGNOSIS — I42.9 CARDIOMYOPATHY, UNSPECIFIED: ICD-10-CM

## 2017-01-31 DIAGNOSIS — E83.42 HYPOMAGNESEMIA: ICD-10-CM

## 2017-01-31 DIAGNOSIS — E78.5 HYPERLIPIDEMIA, UNSPECIFIED: ICD-10-CM

## 2017-01-31 DIAGNOSIS — N18.4 CHRONIC KIDNEY DISEASE, STAGE 4 (SEVERE): ICD-10-CM

## 2017-01-31 DIAGNOSIS — Z90.49 ACQUIRED ABSENCE OF OTHER SPECIFIED PARTS OF DIGESTIVE TRACT: ICD-10-CM

## 2017-01-31 DIAGNOSIS — E87.6 HYPOKALEMIA: ICD-10-CM
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
## 2017-01-31 DIAGNOSIS — J45.909 UNSPECIFIED ASTHMA, UNCOMPLICATED: ICD-10-CM

## 2017-01-31 DIAGNOSIS — Z91.013 ALLERGY TO SEAFOOD: ICD-10-CM

## 2017-01-31 DIAGNOSIS — I50.22 CHRONIC SYSTOLIC (CONGESTIVE) HEART FAILURE: ICD-10-CM

## 2017-01-31 DIAGNOSIS — M31.6 OTHER GIANT CELL ARTERITIS: ICD-10-CM

## 2017-01-31 DIAGNOSIS — I13.0 HYPERTENSIVE HEART AND CHRONIC KIDNEY DISEASE WITH HEART FAILURE AND STAGE 1 THROUGH STAGE 4 CHRONIC KIDNEY DISEASE, OR UNSPECIFIED CHRONIC KIDNEY DISEASE: ICD-10-CM

## 2017-01-31 DIAGNOSIS — Z88.1 ALLERGY STATUS TO OTHER ANTIBIOTIC AGENTS STATUS: ICD-10-CM

## 2017-01-31 DIAGNOSIS — K91.2 POSTSURGICAL MALABSORPTION, NOT ELSEWHERE CLASSIFIED: ICD-10-CM

## 2017-01-31 DIAGNOSIS — Z66 DO NOT RESUSCITATE: ICD-10-CM

## 2017-01-31 DIAGNOSIS — D50.9 IRON DEFICIENCY ANEMIA, UNSPECIFIED: ICD-10-CM

## 2017-01-31 DIAGNOSIS — I25.2 OLD MYOCARDIAL INFARCTION: ICD-10-CM

## 2017-01-31 DIAGNOSIS — E27.40 UNSPECIFIED ADRENOCORTICAL INSUFFICIENCY: ICD-10-CM

## 2017-01-31 PROBLEM — I21.3 ST ELEVATION (STEMI) MYOCARDIAL INFARCTION OF UNSPECIFIED SITE: Chronic | Status: ACTIVE | Noted: 2017-01-24

## 2017-02-23 ENCOUNTER — APPOINTMENT (OUTPATIENT)
Dept: HEART AND VASCULAR | Facility: CLINIC | Age: 82
End: 2017-02-23

## 2017-02-23 VITALS
DIASTOLIC BLOOD PRESSURE: 82 MMHG | OXYGEN SATURATION: 100 % | HEIGHT: 62 IN | SYSTOLIC BLOOD PRESSURE: 163 MMHG | BODY MASS INDEX: 25.03 KG/M2 | WEIGHT: 136 LBS | HEART RATE: 81 BPM

## 2017-03-23 ENCOUNTER — FORM ENCOUNTER (OUTPATIENT)
Age: 82
End: 2017-03-23

## 2017-03-24 ENCOUNTER — OUTPATIENT (OUTPATIENT)
Dept: OUTPATIENT SERVICES | Facility: HOSPITAL | Age: 82
LOS: 1 days | End: 2017-03-24
Payer: COMMERCIAL

## 2017-03-24 DIAGNOSIS — Z90.49 ACQUIRED ABSENCE OF OTHER SPECIFIED PARTS OF DIGESTIVE TRACT: Chronic | ICD-10-CM

## 2017-03-24 DIAGNOSIS — Z92.89 PERSONAL HISTORY OF OTHER MEDICAL TREATMENT: Chronic | ICD-10-CM

## 2017-03-24 DIAGNOSIS — I50.9 HEART FAILURE, UNSPECIFIED: ICD-10-CM

## 2017-03-24 DIAGNOSIS — Z90.710 ACQUIRED ABSENCE OF BOTH CERVIX AND UTERUS: Chronic | ICD-10-CM

## 2017-03-24 PROCEDURE — 93306 TTE W/DOPPLER COMPLETE: CPT | Mod: 26

## 2017-03-24 PROCEDURE — 93306 TTE W/DOPPLER COMPLETE: CPT

## 2017-03-30 ENCOUNTER — APPOINTMENT (OUTPATIENT)
Dept: HEART AND VASCULAR | Facility: CLINIC | Age: 82
End: 2017-03-30

## 2017-05-02 ENCOUNTER — APPOINTMENT (OUTPATIENT)
Dept: RHEUMATOLOGY | Facility: CLINIC | Age: 82
End: 2017-05-02

## 2017-05-06 ENCOUNTER — EMERGENCY (EMERGENCY)
Facility: HOSPITAL | Age: 82
LOS: 1 days | Discharge: HOME | End: 2017-05-06
Attending: EMERGENCY MEDICINE | Admitting: EMERGENCY MEDICINE
Payer: COMMERCIAL

## 2017-05-06 VITALS
SYSTOLIC BLOOD PRESSURE: 142 MMHG | TEMPERATURE: 99 F | DIASTOLIC BLOOD PRESSURE: 63 MMHG | OXYGEN SATURATION: 99 % | HEART RATE: 79 BPM | RESPIRATION RATE: 18 BRPM

## 2017-05-06 VITALS
OXYGEN SATURATION: 98 % | RESPIRATION RATE: 16 BRPM | TEMPERATURE: 98 F | WEIGHT: 104.94 LBS | HEART RATE: 78 BPM | DIASTOLIC BLOOD PRESSURE: 74 MMHG | SYSTOLIC BLOOD PRESSURE: 155 MMHG

## 2017-05-06 DIAGNOSIS — J45.909 UNSPECIFIED ASTHMA, UNCOMPLICATED: ICD-10-CM

## 2017-05-06 DIAGNOSIS — E86.0 DEHYDRATION: ICD-10-CM

## 2017-05-06 DIAGNOSIS — R07.89 OTHER CHEST PAIN: ICD-10-CM

## 2017-05-06 DIAGNOSIS — N18.9 CHRONIC KIDNEY DISEASE, UNSPECIFIED: ICD-10-CM

## 2017-05-06 DIAGNOSIS — Z79.899 OTHER LONG TERM (CURRENT) DRUG THERAPY: ICD-10-CM

## 2017-05-06 DIAGNOSIS — Z79.891 LONG TERM (CURRENT) USE OF OPIATE ANALGESIC: ICD-10-CM

## 2017-05-06 DIAGNOSIS — Z88.8 ALLERGY STATUS TO OTHER DRUGS, MEDICAMENTS AND BIOLOGICAL SUBSTANCES STATUS: ICD-10-CM

## 2017-05-06 DIAGNOSIS — N17.9 ACUTE KIDNEY FAILURE, UNSPECIFIED: ICD-10-CM

## 2017-05-06 DIAGNOSIS — Z91.013 ALLERGY TO SEAFOOD: ICD-10-CM

## 2017-05-06 DIAGNOSIS — Z90.710 ACQUIRED ABSENCE OF BOTH CERVIX AND UTERUS: Chronic | ICD-10-CM

## 2017-05-06 DIAGNOSIS — Z88.0 ALLERGY STATUS TO PENICILLIN: ICD-10-CM

## 2017-05-06 DIAGNOSIS — Z92.89 PERSONAL HISTORY OF OTHER MEDICAL TREATMENT: Chronic | ICD-10-CM

## 2017-05-06 DIAGNOSIS — I12.9 HYPERTENSIVE CHRONIC KIDNEY DISEASE WITH STAGE 1 THROUGH STAGE 4 CHRONIC KIDNEY DISEASE, OR UNSPECIFIED CHRONIC KIDNEY DISEASE: ICD-10-CM

## 2017-05-06 DIAGNOSIS — Z91.018 ALLERGY TO OTHER FOODS: ICD-10-CM

## 2017-05-06 DIAGNOSIS — Z90.49 ACQUIRED ABSENCE OF OTHER SPECIFIED PARTS OF DIGESTIVE TRACT: Chronic | ICD-10-CM

## 2017-05-06 PROCEDURE — 85025 COMPLETE CBC W/AUTO DIFF WBC: CPT

## 2017-05-06 PROCEDURE — 80048 BASIC METABOLIC PNL TOTAL CA: CPT

## 2017-05-06 PROCEDURE — 71020: CPT | Mod: 26

## 2017-05-06 PROCEDURE — 82550 ASSAY OF CK (CPK): CPT

## 2017-05-06 PROCEDURE — 93010 ELECTROCARDIOGRAM REPORT: CPT

## 2017-05-06 PROCEDURE — 80053 COMPREHEN METABOLIC PANEL: CPT

## 2017-05-06 PROCEDURE — 85730 THROMBOPLASTIN TIME PARTIAL: CPT

## 2017-05-06 PROCEDURE — 85610 PROTHROMBIN TIME: CPT

## 2017-05-06 PROCEDURE — 82553 CREATINE MB FRACTION: CPT

## 2017-05-06 PROCEDURE — 84484 ASSAY OF TROPONIN QUANT: CPT

## 2017-05-06 PROCEDURE — 93005 ELECTROCARDIOGRAM TRACING: CPT

## 2017-05-06 PROCEDURE — 99285 EMERGENCY DEPT VISIT HI MDM: CPT | Mod: 25

## 2017-05-06 PROCEDURE — 71046 X-RAY EXAM CHEST 2 VIEWS: CPT

## 2017-05-06 PROCEDURE — 83880 ASSAY OF NATRIURETIC PEPTIDE: CPT

## 2017-05-06 PROCEDURE — 99284 EMERGENCY DEPT VISIT MOD MDM: CPT | Mod: 25

## 2017-05-06 PROCEDURE — 96374 THER/PROPH/DIAG INJ IV PUSH: CPT

## 2017-05-06 RX ORDER — CALCIUM GLUCONATE 100 MG/ML
2 VIAL (ML) INTRAVENOUS ONCE
Qty: 0 | Refills: 0 | Status: COMPLETED | OUTPATIENT
Start: 2017-05-06 | End: 2017-05-06

## 2017-05-06 RX ORDER — SODIUM CHLORIDE 9 MG/ML
1000 INJECTION INTRAMUSCULAR; INTRAVENOUS; SUBCUTANEOUS ONCE
Qty: 0 | Refills: 0 | Status: COMPLETED | OUTPATIENT
Start: 2017-05-06 | End: 2017-05-06

## 2017-05-06 RX ADMIN — SODIUM CHLORIDE 1000 MILLILITER(S): 9 INJECTION INTRAMUSCULAR; INTRAVENOUS; SUBCUTANEOUS at 12:01

## 2017-05-06 RX ADMIN — Medication 200 GRAM(S): at 11:54

## 2017-05-06 NOTE — ED PROVIDER NOTE - CONDUCTED A DETAILED DISCUSSION WITH PATIENT AND/OR GUARDIAN REGARDING, MDM
dialysis goals of care/lab results/DNR/radiology results/need for outpatient follow-up/return to ED if symptoms worsen, persist or questions arise

## 2017-05-06 NOTE — ED PROVIDER NOTE - CARE PLAN
Principal Discharge DX:	Dehydration  Secondary Diagnosis:	Acute kidney injury  Secondary Diagnosis:	Chronic kidney disease, unspecified stage

## 2017-05-06 NOTE — ED PROVIDER NOTE - MEDICAL DECISION MAKING DETAILS
here with dehydration in setting of likely viral syndrome and CP. CXR clear, trops + but stable. BNP less than in past (52347 on last admission) and clinically not in CHF. Cough resolved though some chest pain lingers. Discussed at length with patient, family, and Dr. Hoyt. All prefer to manage her as outpatient. I do feel that admission is not unreasonable and offered it to them but they do not want. They will continue to follow up closely with dr. hoyt and return to the ED for any new or worsening symptoms, or if they change their mind.

## 2017-05-06 NOTE — ED PROVIDER NOTE - OBJECTIVE STATEMENT
84yo F complex medical hx here for fatigue, not feeling well for a few days. Started several days ago, had N/V/D, and cough, congestion. Was pretty dehydrated, and at one point, really couldn't get out of bed. These symptoms have improved markedly, pt now tolerating po. However developed some CP last night, and daughter saw her this am and thought pt too run down, brought in for evaluation. Pt states that she mostly is better but still feels a bit dehydrated, feels like she hasn't caught up on fluids. Has been in touch her with PMD Dr. Hoyt who also wanted her to come to the ED.     Of note pt is DNR/DNI. Has not yet decided whether she wants dialysis or not if her kidneys continue to fail. Really wants to avoid admission or [facility] placement as she was just there for 3 months and absolutely hated it. Much happier now that she is back in her apartment, and daughter checking in on her (lives in same building)/

## 2017-05-06 NOTE — ED ADULT NURSE NOTE - OBJECTIVE STATEMENT
Pt came to ED complaining of midsternal chest pain that radiates to left chest and to left back.  Pt reports 8/10 sharp pain that started last night. Pt denies SOB, abd pain, /GI symptoms, D/N/V, diaphoresis.  Lung sounds clear bilaterally, pt alert and oriented x3.  Hx of MI in Jan 2017.

## 2017-05-31 ENCOUNTER — INPATIENT (INPATIENT)
Facility: HOSPITAL | Age: 82
LOS: 1 days | Discharge: HOME | DRG: 640 | End: 2017-06-02
Attending: HOSPITALIST | Admitting: HOSPITALIST
Payer: COMMERCIAL

## 2017-05-31 VITALS
SYSTOLIC BLOOD PRESSURE: 174 MMHG | DIASTOLIC BLOOD PRESSURE: 84 MMHG | OXYGEN SATURATION: 99 % | HEART RATE: 85 BPM | RESPIRATION RATE: 17 BRPM | TEMPERATURE: 99 F

## 2017-05-31 DIAGNOSIS — Z90.710 ACQUIRED ABSENCE OF BOTH CERVIX AND UTERUS: Chronic | ICD-10-CM

## 2017-05-31 DIAGNOSIS — K56.69 OTHER INTESTINAL OBSTRUCTION: ICD-10-CM

## 2017-05-31 DIAGNOSIS — E87.2 ACIDOSIS: ICD-10-CM

## 2017-05-31 DIAGNOSIS — E83.51 HYPOCALCEMIA: ICD-10-CM

## 2017-05-31 DIAGNOSIS — N18.4 CHRONIC KIDNEY DISEASE, STAGE 4 (SEVERE): ICD-10-CM

## 2017-05-31 DIAGNOSIS — I42.9 CARDIOMYOPATHY, UNSPECIFIED: ICD-10-CM

## 2017-05-31 DIAGNOSIS — M31.6 OTHER GIANT CELL ARTERITIS: ICD-10-CM

## 2017-05-31 DIAGNOSIS — I10 ESSENTIAL (PRIMARY) HYPERTENSION: ICD-10-CM

## 2017-05-31 DIAGNOSIS — Z29.9 ENCOUNTER FOR PROPHYLACTIC MEASURES, UNSPECIFIED: ICD-10-CM

## 2017-05-31 DIAGNOSIS — D64.9 ANEMIA, UNSPECIFIED: ICD-10-CM

## 2017-05-31 DIAGNOSIS — R74.8 ABNORMAL LEVELS OF OTHER SERUM ENZYMES: ICD-10-CM

## 2017-05-31 DIAGNOSIS — D50.9 IRON DEFICIENCY ANEMIA, UNSPECIFIED: ICD-10-CM

## 2017-05-31 DIAGNOSIS — Z92.89 PERSONAL HISTORY OF OTHER MEDICAL TREATMENT: Chronic | ICD-10-CM

## 2017-05-31 DIAGNOSIS — Z90.49 ACQUIRED ABSENCE OF OTHER SPECIFIED PARTS OF DIGESTIVE TRACT: Chronic | ICD-10-CM

## 2017-05-31 LAB
24R-OH-CALCIDIOL SERPL-MCNC: 20.5 NG/ML — LOW (ref 30–100)
ALBUMIN SERPL ELPH-MCNC: 3.1 G/DL — LOW (ref 3.3–5)
ALBUMIN SERPL ELPH-MCNC: 3.2 G/DL — LOW (ref 3.3–5)
ALP SERPL-CCNC: 75 U/L — SIGNIFICANT CHANGE UP (ref 40–120)
ALT FLD-CCNC: 7 U/L — LOW (ref 10–45)
ANION GAP SERPL CALC-SCNC: 29 MMOL/L — HIGH (ref 5–17)
ANION GAP SERPL CALC-SCNC: 30 MMOL/L — HIGH (ref 5–17)
ANION GAP SERPL CALC-SCNC: 31 MMOL/L — HIGH (ref 5–17)
AST SERPL-CCNC: 16 U/L — SIGNIFICANT CHANGE UP (ref 10–40)
BILIRUB SERPL-MCNC: 0.4 MG/DL — SIGNIFICANT CHANGE UP (ref 0.2–1.2)
BLD GP AB SCN SERPL QL: NEGATIVE — SIGNIFICANT CHANGE UP
BUN SERPL-MCNC: 79 MG/DL — HIGH (ref 7–23)
BUN SERPL-MCNC: 80 MG/DL — HIGH (ref 7–23)
BUN SERPL-MCNC: 81 MG/DL — HIGH (ref 7–23)
CALCIUM SERPL-MCNC: 4.1 MG/DL — CRITICAL LOW (ref 8.4–10.5)
CALCIUM SERPL-MCNC: 4.5 MG/DL — CRITICAL LOW (ref 8.4–10.5)
CALCIUM SERPL-MCNC: 4.6 MG/DL — CRITICAL LOW (ref 8.4–10.5)
CALCIUM SERPL-MCNC: 4.6 MG/DL — CRITICAL LOW (ref 8.4–10.5)
CHLORIDE SERPL-SCNC: 101 MMOL/L — SIGNIFICANT CHANGE UP (ref 96–108)
CHLORIDE SERPL-SCNC: 101 MMOL/L — SIGNIFICANT CHANGE UP (ref 96–108)
CHLORIDE SERPL-SCNC: 103 MMOL/L — SIGNIFICANT CHANGE UP (ref 96–108)
CK MB CFR SERPL CALC: 4.1 NG/ML — SIGNIFICANT CHANGE UP (ref 0–6.7)
CK SERPL-CCNC: 377 U/L — HIGH (ref 25–170)
CO2 SERPL-SCNC: 11 MMOL/L — LOW (ref 22–31)
CO2 SERPL-SCNC: 11 MMOL/L — LOW (ref 22–31)
CO2 SERPL-SCNC: 13 MMOL/L — LOW (ref 22–31)
CREAT SERPL-MCNC: 4 MG/DL — HIGH (ref 0.5–1.3)
CREAT SERPL-MCNC: 4.1 MG/DL — HIGH (ref 0.5–1.3)
CREAT SERPL-MCNC: 4.2 MG/DL — HIGH (ref 0.5–1.3)
FERRITIN SERPL-MCNC: 1231 NG/ML — HIGH (ref 15–150)
FOLATE SERPL-MCNC: >20 NG/ML — SIGNIFICANT CHANGE UP (ref 4.8–24.2)
GLUCOSE SERPL-MCNC: 68 MG/DL — LOW (ref 70–99)
GLUCOSE SERPL-MCNC: 70 MG/DL — SIGNIFICANT CHANGE UP (ref 70–99)
GLUCOSE SERPL-MCNC: 78 MG/DL — SIGNIFICANT CHANGE UP (ref 70–99)
HCT VFR BLD CALC: 22.3 % — LOW (ref 34.5–45)
HCT VFR BLD CALC: 22.4 % — LOW (ref 34.5–45)
HGB BLD-MCNC: 7.3 G/DL — LOW (ref 11.5–15.5)
HGB BLD-MCNC: 7.3 G/DL — LOW (ref 11.5–15.5)
IRON SATN MFR SERPL: 11 UG/DL — LOW (ref 30–160)
IRON SATN MFR SERPL: 8 % — LOW (ref 14–50)
LACTATE SERPL-SCNC: 1.5 MMOL/L — SIGNIFICANT CHANGE UP (ref 0.5–2)
LYMPHOCYTES # BLD AUTO: 5 % — LOW (ref 13–44)
MAGNESIUM SERPL-MCNC: 0.6 MG/DL — CRITICAL LOW (ref 1.6–2.6)
MAGNESIUM SERPL-MCNC: 2.1 MG/DL — SIGNIFICANT CHANGE UP (ref 1.6–2.6)
MCHC RBC-ENTMCNC: 24 PG — LOW (ref 27–34)
MCHC RBC-ENTMCNC: 24.3 PG — LOW (ref 27–34)
MCHC RBC-ENTMCNC: 32.6 G/DL — SIGNIFICANT CHANGE UP (ref 32–36)
MCHC RBC-ENTMCNC: 32.7 G/DL — SIGNIFICANT CHANGE UP (ref 32–36)
MCV RBC AUTO: 73.7 FL — LOW (ref 80–100)
MCV RBC AUTO: 74.3 FL — LOW (ref 80–100)
MONOCYTES NFR BLD AUTO: 1 % — LOW (ref 2–14)
NEUTROPHILS NFR BLD AUTO: 93 % — HIGH (ref 43–77)
NT-PROBNP SERPL-SCNC: 1545 PG/ML — HIGH (ref 0–300)
PHOSPHATE SERPL-MCNC: 7.6 MG/DL — HIGH (ref 2.5–4.5)
PLATELET # BLD AUTO: 263 K/UL — SIGNIFICANT CHANGE UP (ref 150–400)
PLATELET # BLD AUTO: 285 K/UL — SIGNIFICANT CHANGE UP (ref 150–400)
POTASSIUM SERPL-MCNC: 3.3 MMOL/L — LOW (ref 3.5–5.3)
POTASSIUM SERPL-MCNC: 3.6 MMOL/L — SIGNIFICANT CHANGE UP (ref 3.5–5.3)
POTASSIUM SERPL-MCNC: 3.6 MMOL/L — SIGNIFICANT CHANGE UP (ref 3.5–5.3)
POTASSIUM SERPL-SCNC: 3.3 MMOL/L — LOW (ref 3.5–5.3)
POTASSIUM SERPL-SCNC: 3.6 MMOL/L — SIGNIFICANT CHANGE UP (ref 3.5–5.3)
POTASSIUM SERPL-SCNC: 3.6 MMOL/L — SIGNIFICANT CHANGE UP (ref 3.5–5.3)
PROT SERPL-MCNC: 6.4 G/DL — SIGNIFICANT CHANGE UP (ref 6–8.3)
PTH-INTACT FLD-MCNC: 494 PG/ML — HIGH (ref 15–65)
RBC # BLD: 3 M/UL — LOW (ref 3.8–5.2)
RBC # BLD: 3.04 M/UL — LOW (ref 3.8–5.2)
RBC # FLD: 20.7 % — HIGH (ref 10.3–16.9)
RBC # FLD: 21.1 % — HIGH (ref 10.3–16.9)
RH IG SCN BLD-IMP: POSITIVE — SIGNIFICANT CHANGE UP
SODIUM SERPL-SCNC: 143 MMOL/L — SIGNIFICANT CHANGE UP (ref 135–145)
SODIUM SERPL-SCNC: 143 MMOL/L — SIGNIFICANT CHANGE UP (ref 135–145)
SODIUM SERPL-SCNC: 144 MMOL/L — SIGNIFICANT CHANGE UP (ref 135–145)
TIBC SERPL-MCNC: 140 UG/DL — LOW (ref 220–430)
TRANSFERRIN SERPL-MCNC: 112 MG/DL — LOW (ref 200–360)
TROPONIN T SERPL-MCNC: 0.22 NG/ML — CRITICAL HIGH (ref 0–0.01)
TROPONIN T SERPL-MCNC: 0.23 NG/ML — CRITICAL HIGH (ref 0–0.01)
UIBC SERPL-MCNC: 129 UG/DL — SIGNIFICANT CHANGE UP (ref 110–370)
VIT B12 SERPL-MCNC: 932 PG/ML — HIGH (ref 243–894)
VIT D25+D1,25 OH+D1,25 PNL SERPL-MCNC: 34 PG/ML — SIGNIFICANT CHANGE UP (ref 19.9–79.3)
WBC # BLD: 8.9 K/UL — SIGNIFICANT CHANGE UP (ref 3.8–10.5)
WBC # BLD: 9 K/UL — SIGNIFICANT CHANGE UP (ref 3.8–10.5)
WBC # FLD AUTO: 8.9 K/UL — SIGNIFICANT CHANGE UP (ref 3.8–10.5)
WBC # FLD AUTO: 9 K/UL — SIGNIFICANT CHANGE UP (ref 3.8–10.5)

## 2017-05-31 PROCEDURE — 93010 ELECTROCARDIOGRAM REPORT: CPT | Mod: 77

## 2017-05-31 PROCEDURE — 99222 1ST HOSP IP/OBS MODERATE 55: CPT

## 2017-05-31 PROCEDURE — 71010: CPT | Mod: 26

## 2017-05-31 PROCEDURE — 99223 1ST HOSP IP/OBS HIGH 75: CPT | Mod: GC

## 2017-05-31 PROCEDURE — 93010 ELECTROCARDIOGRAM REPORT: CPT

## 2017-05-31 PROCEDURE — 99223 1ST HOSP IP/OBS HIGH 75: CPT

## 2017-05-31 PROCEDURE — 99285 EMERGENCY DEPT VISIT HI MDM: CPT | Mod: 25

## 2017-05-31 RX ORDER — ATORVASTATIN CALCIUM 80 MG/1
40 TABLET, FILM COATED ORAL AT BEDTIME
Qty: 0 | Refills: 0 | Status: DISCONTINUED | OUTPATIENT
Start: 2017-05-31 | End: 2017-06-02

## 2017-05-31 RX ORDER — MAGNESIUM SULFATE 500 MG/ML
4 VIAL (ML) INJECTION ONCE
Qty: 0 | Refills: 0 | Status: DISCONTINUED | OUTPATIENT
Start: 2017-05-31 | End: 2017-05-31

## 2017-05-31 RX ORDER — HEPARIN SODIUM 5000 [USP'U]/ML
5000 INJECTION INTRAVENOUS; SUBCUTANEOUS EVERY 12 HOURS
Qty: 0 | Refills: 0 | Status: DISCONTINUED | OUTPATIENT
Start: 2017-05-31 | End: 2017-06-02

## 2017-05-31 RX ORDER — MAGNESIUM SULFATE 500 MG/ML
2 VIAL (ML) INJECTION ONCE
Qty: 0 | Refills: 0 | Status: COMPLETED | OUTPATIENT
Start: 2017-05-31 | End: 2017-05-31

## 2017-05-31 RX ORDER — CALCIUM GLUCONATE 100 MG/ML
1 VIAL (ML) INTRAVENOUS ONCE
Qty: 0 | Refills: 0 | Status: DISCONTINUED | OUTPATIENT
Start: 2017-05-31 | End: 2017-05-31

## 2017-05-31 RX ORDER — HYDRALAZINE HCL 50 MG
25 TABLET ORAL THREE TIMES A DAY
Qty: 0 | Refills: 0 | Status: DISCONTINUED | OUTPATIENT
Start: 2017-05-31 | End: 2017-06-02

## 2017-05-31 RX ORDER — MAGNESIUM SULFATE 500 MG/ML
4 VIAL (ML) INJECTION ONCE
Qty: 0 | Refills: 0 | Status: COMPLETED | OUTPATIENT
Start: 2017-05-31 | End: 2017-05-31

## 2017-05-31 RX ORDER — POTASSIUM CHLORIDE 20 MEQ
20 PACKET (EA) ORAL ONCE
Qty: 0 | Refills: 0 | Status: COMPLETED | OUTPATIENT
Start: 2017-05-31 | End: 2017-05-31

## 2017-05-31 RX ORDER — TAMSULOSIN HYDROCHLORIDE 0.4 MG/1
0.4 CAPSULE ORAL AT BEDTIME
Qty: 0 | Refills: 0 | Status: DISCONTINUED | OUTPATIENT
Start: 2017-05-31 | End: 2017-06-02

## 2017-05-31 RX ORDER — CALCIUM GLUCONATE 100 MG/ML
1 VIAL (ML) INTRAVENOUS ONCE
Qty: 0 | Refills: 0 | Status: COMPLETED | OUTPATIENT
Start: 2017-05-31 | End: 2017-05-31

## 2017-05-31 RX ORDER — HEPARIN SODIUM 5000 [USP'U]/ML
5000 INJECTION INTRAVENOUS; SUBCUTANEOUS EVERY 8 HOURS
Qty: 0 | Refills: 0 | Status: DISCONTINUED | OUTPATIENT
Start: 2017-05-31 | End: 2017-05-31

## 2017-05-31 RX ORDER — CARVEDILOL PHOSPHATE 80 MG/1
6.25 CAPSULE, EXTENDED RELEASE ORAL EVERY 12 HOURS
Qty: 0 | Refills: 0 | Status: DISCONTINUED | OUTPATIENT
Start: 2017-05-31 | End: 2017-06-02

## 2017-05-31 RX ORDER — CALCITRIOL 0.5 UG/1
0.5 CAPSULE ORAL DAILY
Qty: 0 | Refills: 0 | Status: DISCONTINUED | OUTPATIENT
Start: 2017-05-31 | End: 2017-06-02

## 2017-05-31 RX ORDER — SODIUM CHLORIDE 9 MG/ML
1000 INJECTION INTRAMUSCULAR; INTRAVENOUS; SUBCUTANEOUS ONCE
Qty: 0 | Refills: 0 | Status: COMPLETED | OUTPATIENT
Start: 2017-05-31 | End: 2017-05-31

## 2017-05-31 RX ORDER — CLOPIDOGREL BISULFATE 75 MG/1
75 TABLET, FILM COATED ORAL ONCE
Qty: 0 | Refills: 0 | Status: COMPLETED | OUTPATIENT
Start: 2017-05-31 | End: 2017-05-31

## 2017-05-31 RX ADMIN — ATORVASTATIN CALCIUM 40 MILLIGRAM(S): 80 TABLET, FILM COATED ORAL at 22:42

## 2017-05-31 RX ADMIN — CLOPIDOGREL BISULFATE 75 MILLIGRAM(S): 75 TABLET, FILM COATED ORAL at 02:33

## 2017-05-31 RX ADMIN — CARVEDILOL PHOSPHATE 6.25 MILLIGRAM(S): 80 CAPSULE, EXTENDED RELEASE ORAL at 19:16

## 2017-05-31 RX ADMIN — Medication 50 GRAM(S): at 10:16

## 2017-05-31 RX ADMIN — CARVEDILOL PHOSPHATE 6.25 MILLIGRAM(S): 80 CAPSULE, EXTENDED RELEASE ORAL at 07:21

## 2017-05-31 RX ADMIN — Medication 25 MILLIGRAM(S): at 13:16

## 2017-05-31 RX ADMIN — SODIUM CHLORIDE 1000 MILLILITER(S): 9 INJECTION INTRAMUSCULAR; INTRAVENOUS; SUBCUTANEOUS at 01:52

## 2017-05-31 RX ADMIN — Medication 200 GRAM(S): at 03:02

## 2017-05-31 RX ADMIN — Medication 50 GRAM(S): at 05:00

## 2017-05-31 RX ADMIN — Medication 25 MILLIGRAM(S): at 22:42

## 2017-05-31 RX ADMIN — Medication 20 MILLIEQUIVALENT(S): at 11:52

## 2017-05-31 RX ADMIN — CALCITRIOL 0.5 MICROGRAM(S): 0.5 CAPSULE ORAL at 19:16

## 2017-05-31 RX ADMIN — TAMSULOSIN HYDROCHLORIDE 0.4 MILLIGRAM(S): 0.4 CAPSULE ORAL at 22:42

## 2017-05-31 RX ADMIN — Medication 5 MILLIGRAM(S): at 07:21

## 2017-05-31 RX ADMIN — HEPARIN SODIUM 5000 UNIT(S): 5000 INJECTION INTRAVENOUS; SUBCUTANEOUS at 19:16

## 2017-05-31 RX ADMIN — Medication 25 MILLIGRAM(S): at 07:21

## 2017-05-31 NOTE — CONSULT NOTE ADULT - ASSESSMENT
ANTONIA on CKD due to hypovolemia and/or ATN. Hypocalcemia and hypomagnesemia. Anemia.    Suggest:    1. Replace corrected calcium to > 7.  2. Keep K > 4 mg > 2.  3. Replace intravascular volume. Follow up closely for evidence of fluid overload. Follow SCr, UOP.  4. No urgent indications for dialysis.  5. Start procrit 10,000 U sq weekly.    Please call with any questions.    Hector Bryan MD, FACP, FASN | [facility]  Nephrology, Hypertension, and Internal Medicine  Mobile: [phone] (Daytime Hours Only)  Office/Answering Service: [phone]  [employer] of Medicine, [employer]  [facility] - Nephrology at [address]  [address], [address], [place], [place]

## 2017-05-31 NOTE — CONSULT NOTE ADULT - PROBLEM SELECTOR RECOMMENDATION 9
-- Patient with severe electrolyte abnormalities including hypocalcemia, hypokalemia.  Recommend repleting magnesium aggressively (Mg was 0.6), then checking PTH level and Calcium gluconate IV pushes  -- Patient asymptomatic, can hold on calcium drip at this time  -- recommend palliative consult in AM to clarify GOC    Admit to [email or web address] for electrolyte repletion

## 2017-05-31 NOTE — CONSULT NOTE ADULT - SUBJECTIVE AND OBJECTIVE BOX
HPI:  85F PMH htn, CKD 4, takotsubo's ef improved, sbo surgically repaired, anemia, hypocalcemia, nstemi managed medically one year ago presenting with chest pain. Intermittent, midsternal. Brought in by daughter as for reduced PO intake. Over the last several days patient says she has lost her appetite and is not taking her medications. She states her strength is good. No paresthesias. No episodes of confusion. ROS otherwise negative for fever/chills, dizzyness, blurry vision,sob, CP, nausea, vomiting, dysuria, focal weakness, rash. Has chronic soft stools following bowel resection from SBO.     ED VS: 98.6 174/84 85 17 99 on RA. New TWI in V4-6. Chest pain resolved. CXR clear. Hgb 7.3. Mg .6, iron 11, calcium 4.5. Crt 4.1 at baseline. Trops .23. Given 1g calcium, then 4g magnesium in ED. NS 1L bolus, plavix 75.   I  Baseline SCr 2 5 months ago. Per family, she has had poor po intake for 2 weeks. Creatinine on admission increased to 4.1. Denies NSAID use.       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   Card: No chest pain.   Resp: No SOB.  GI: No nausea or vomiting. No abdominal pain.  : No dysuria. Neuro: No focal weakness or sensory loss.  Eyes: No visual symptoms. MS: No joint swelling.  Skin: No rashes. Psych: No psychosis.    PHYSICAL EXAM:  I & Os for 24h ending 05-31 @ 07:00  =============================================  IN: 100 ml / OUT: 0 ml / NET: 100 ml    I & Os for current day (as of 05-31 @ 19:52)  =============================================  IN: 300 ml / OUT: 350 ml / NET: -50 ml    Constitutional: T(C): 36.8, Max: 37 (05-31 @ 00:58)  HR: 66 (66 - 85)  BP: 121/66 (121/66 - 174/84)  RR: 17 (17 - 19)  SpO2: 100% (98% - 100%)  Wt(kg): --  Appearance: Alert, pleasant, INAD.  Eyes: Conjunctivae pink, moist. Pupils equal and reactive.  ENT: External ears/nose normal appearing. Lips normal, dentition good.  Lymphatic: No cervical lymphadenopathy. No supraclavicular lymphadenopathy.  Cardiac: No rubs. NO MURMURS. Extremities: NO LE EDEMA.  Respiratory: Effort no accessory muscle use. Lungs: CLEAR TO AUSCULTATION.  Abdomen: Soft. No masses. Nontender. Liver: Not palpable. Spleen: Not palpable.  Musculoskeltal digits: No clubbing or cyanosis. Tone normal. Moves all four extremities.  Skin inspection: No rashes. Palpation: No abnormalities.  Neuro: Cranial nerves: no facial assymetry or deficits noted. Sensation: LE intact to light touch.  Psych:  Mood/affect: Not depressed.     DATA:  143    |  101    |  81<H>  ----------------------------<  68<L>  Ca:4.6<LL> (31 May 2017 11:23)  3.6     |  11<L>  |  4.00<H>      eGFR if Non : 10 <L>  eGFR if : 11 <L>    TPro  x      /  Alb  3.1 g/dL<L>  /  TBili  x      /  DBili  x      /  AST  x      /  ALT  x      /  AlkPhos  x      31 May 2017 11:23                        7.3<L>  8.9   )-----------( 263      ( 31 May 2017 03:42 )             22.3<L>    Phos:-- Mg:-- PTH:494 pg/mL<H> Uric acid:-- Serum Osm:--  Ferritin:-- Iron:-- TIBC:-- Tsat:--  B12:-- TSH:-- (05-31 @ 15:59)

## 2017-05-31 NOTE — H&P ADULT - PROBLEM SELECTOR PLAN 5
Secondary to takotsubo's and previous nstemi  Hold Lasix (40mg every other day), f/u cardio recs  EF 65%

## 2017-05-31 NOTE — H&P ADULT - PROBLEM SELECTOR PLAN 3
Etiology: acute event v demand in setting of anemia and acute stress  Peaked at .23, chest pain resolved  Given Plavix 75mg  f/u cardiology recs Etiology: acute event v demand in setting of anemia and acute stress  Peaked at .23, chest pain resolved  Given Plavix 75mg  f/u cardiology recs  Serial EKGs

## 2017-05-31 NOTE — CONSULT NOTE ADULT - PROBLEM SELECTOR RECOMMENDATION 2
-- likely acute on chronic iron deficiency anemia. No symptoms of bleeding per hx, and guaic neg per ED  -- transfuse 1 unit PRBC given cardiac hx

## 2017-05-31 NOTE — CONSULT NOTE ADULT - SUBJECTIVE AND OBJECTIVE BOX
83 yo F (DNR/DNI) with pmhx HTN, HLD, Aortic stenosis, takotsubo's cardiomyopathy with HFrEF 25% in 12/2016, CKD stage IV, asthma/copd, RA, SBO s/p bowel resection 10/2016 with short gut syndrome, chronic anemia, chronic hypocalcemia, h/o NSTEMI 12/2016 managed medically who was brought by her daughter for "generalized weakness, fatigue, not eating or taking meds". Patient's chief complaint is chest pain, which is midsternal, intermittent without any SOB.  Per daughter at bedside, she is homebound and mostly bedbound, lives alone (daughter lives in same building). Patient denies any paresthesias, muscle weakness or cramps.    Of note, she had similar admission in Jan 2017 where she presented with chest pain and had similar electrolyte abnormalities. She was taken off of aspirin (due to allergy) and plavix due to concerns regarding her anemia (no known bleeding issues).     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   Eyes: PERRLA, EOMI. Non-icteric.   ENMT: Dry MM, no thrush, no pharyngeal erythema or drainage  Neck: Supple, minimal JVD  Respiratory: CTABL no wheezes rhonchi or rales  Cardiovascular: RRR, 3/6 LACHELLE  Gastrointestinal: Soft, ND NT. Normoactive BSx4  Extremities: No pitting edema  Neurological:  No focal motor or sensory deficits  Skin: Warm, dry and well perfused        CBC Full  -  ( 31 May 2017 03:42 )  WBC Count : 8.9 K/uL  Hemoglobin : 7.3 g/dL  Hematocrit : 22.3 %  Platelet Count - Automated : 263 K/uL  Mean Cell Volume : 74.3 fL  Mean Cell Hemoglobin : 24.3 pg  Mean Cell Hemoglobin Concentration : 32.7 g/dL    05-31    143  |  101  |  80<H>  ----------------------------<  78  3.3<L>   |  13<L>  |  4.10<H>    Ca    4.5<LL>      31 May 2017 03:42  Phos  7.6     05-31  Mg     .6     05-31    TPro  6.4  /  Alb  3.2<L>  /  TBili  0.4  /  DBili  x   /  AST  16  /  ALT  7<L>  /  AlkPhos  75  05-31        CARDIAC MARKERS ( 31 May 2017 03:42 )  x     / 0.22 ng/mL / x     / x     / x      CARDIAC MARKERS ( 31 May 2017 01:54 )  x     / 0.23 ng/mL / 377 U/L / x     / 4.1 ng/mL          EKG: Sinus rhythm with TWI lateral leads (new from prior)    CXR: no infiltrates or effusions

## 2017-05-31 NOTE — H&P ADULT - ASSESSMENT
85F PMH htn, CKD 4, takotsubo's ef improved, sbo surgically improved, anemia, hypocalcemia, nstemi managed medically one year ago presenting with chest pain, with electrolyte abnormalities severe hypocalcemia.

## 2017-05-31 NOTE — CONSULT NOTE ADULT - ASSESSMENT
83 yo F (DNR/DNI) with pmhx HTN, HLD, Aortic stenosis, takotsubo's cardiomyopathy with HFrEF 25% in 12/2016, CKD stage IV, asthma/copd, RA, SBO s/p bowel resection 10/2016 with short gut syndrome, chronic anemia, chronic hypocalcemia, h/o NSTEMI 12/2016

## 2017-05-31 NOTE — ED PROVIDER NOTE - OBJECTIVE STATEMENT
85F DNR/DNI, CKD, HTN, ACS, SBO presenting with weakness, decreased PO intake for 4 days. No n/v/diaphoresis. pt with L sided chest pain this afternoon, nonradiating, no sob. Pt states pain is 5/10, moderate. Now resolved. 85F DNR/DNI, CKD, CHF, HTN, ACS, SBO, hx of NSTEMI in the past presenting with weakness, decreased PO intake for 4 days. No n/v/diaphoresis. pt with L sided chest pain this afternoon, nonradiating, no sob. Pt states pain is 5/10, moderate. Now resolved.

## 2017-05-31 NOTE — CONSULT NOTE ADULT - ATTENDING COMMENTS
84F ho takotsubo CM, progressive CKD and worsened anemia, admitted with several days of low energy, chest discomfort. mild troponinemia, lateral T wave changes, marked hypomagnesemia, hypocalcemia which is asymptomatic. she is on PPI for years for unclear indication. no symmptoms due to hypocalcemia.plan to admit to medical stepdown for management of electrolyte abnormalities. impression, PPI induced hypomagnesemia/hypoparathyroidism vs hypocalcemia due to renal failure. supplement mag aggressively, treat with ca gluconate.  monitor elecdtrolytes.  tx prbc given anemia and angina. stool guaic pending. if postiive will consult GI.

## 2017-05-31 NOTE — H&P ADULT - PROBLEM SELECTOR PLAN 6
Crt at baseline, no acute issues Crt at baseline, worsening uremia, electrolyte abnormalities concern for worsening renal disease  Consult renal Crt at baseline, worsening uremia, electrolyte abnormalities concern for worsening renal disease, with hyperphosphatemia  Electrolyte repletion as above  Consult renal

## 2017-05-31 NOTE — H&P ADULT - NSHPPHYSICALEXAM_GEN_ALL_CORE
Gen NAD sitting in bed  HEENT EOMI PERRL no SI, dry no acute lesions, neck supple no jvd no sage, no vision out of left eye  CV rrr 3/6 rusb murmur, no rubs or gallops  Resp ctab no wrc  Ab soft ntnd normoactive bs  Ext warm pulses+ no edema  Neuro AAOx3 no gross deficits, strength 5/5 sensation + , reflexes 2+ Gen NAD sitting in bed  HEENT EOMI PERRL no SI, dry no acute lesions, neck supple no jvd no sage, no vision out of left eye, MMD  CV rrr 3/6 rusb murmur, no rubs or gallops  Resp ctab no wrc  Ab soft ntnd normoactive bs  Ext/Vasc warm radial and DP pulses 2+ no edema  MSK: no joint swelling  skin: no rash  Neuro AAOx3 no gross deficits, strength 5/5 sensation + , reflexes 2+ except biceps 3+  Psych: normal affect  LN: no adenopathy.

## 2017-05-31 NOTE — H&P ADULT - PROBLEM SELECTOR PLAN 9
s/p resection one year ago, likely contributing to electrolyte abnormalities with reduced absorption  No acute issues

## 2017-05-31 NOTE — ED PROVIDER NOTE - MEDICAL DECISION MAKING DETAILS
85F with concern for weakness, chest pain. Vitals notable for HTN, EKG with T wave inv in lateral leads. Plan for labs including trop, plan for IV fluids given hx of dehydration. Pt seen by Dr. Jones in office. Some nonspecific EKG changes, plan for trop although likely elevated in CKD. Will discuss with Robert re: need for admission for tele. 85F with concern for weakness, chest pain. Vitals notable for HTN, EKG with T wave inv in lateral leads. Plan for labs including trop, plan for IV fluids slowsly given hx of dehydration in context of chf. Pt seen by Dr. Jones in office. Some nonspecific EKG changes, plan for trop although likely elevated in CKD. Will discuss with Robert re: need for admission for tele. Pt's family is unsure whether pt would decline catheterization at this time, will discuss with pt.

## 2017-05-31 NOTE — PROGRESS NOTE ADULT - SUBJECTIVE AND OBJECTIVE BOX
REASON FOR CONSULT: CHF    HISTORY OF PRESENT ILLNESS: 85 yo F (DNR/DNI) with pmhx HTN, HLD, Aortic stenosis, takotsubo's cardiomyopathy with HFrEF 25% in 12/2016, CKD stage IV, asthma/copd, RA, SBO s/p bowel resection 10/2016 with short gut syndrome, chronic anemia, chronic hypocalcemia, h/o NSTEMI 12/2016 managed medically who was brought by her daughter for "generalized weakness, fatigue, not eating or taking meds". Patient's chief complaint is chest pain, which is midsternal, intermittent without any SOB.  Per daughter at bedside, she is homebound and mostly bedbound, lives alone (daughter lives in same building). Patient denies any paresthesias, muscle weakness or cramps.    Of note, she had similar admission in Jan 2017 where she presented with chest pain and had similar electrolyte abnormalities. She was taken off of aspirin (due to allergy) and plavix due to concerns regarding her anemia (no known bleeding issues).       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: S/P partial hysterectomy      [ ] Diabetes   [ ] Hypertension  [ ] Hyperlipidemia  [ ] CAD  [ ] PCI  [ ] CABG    PREVIOUS DIAGNOSTIC TESTING:    [ ] Echocardiogram:  [ ]  Catheterization:  [ ] Stress Test:  	    MEDICATIONS:  carvedilol 6.25milliGRAM(s) Oral every 12 hours  hydrALAZINE 25milliGRAM(s) Oral three times a day  tamsulosin 0.4milliGRAM(s) Oral at bedtime            predniSONE   Tablet 5milliGRAM(s) Oral daily    potassium chloride    Tablet ER 20milliEquivalent(s) Oral once  calcium gluconate IVPB 1Gram(s) IV Intermittent once  heparin  Injectable 5000Unit(s) SubCutaneous every 12 hours      FAMILY HISTORY:  No pertinent family history in first degree relatives      SOCIAL HISTORY:    [ ] Non-smoker  [ ] Smoker  [ ] Alcohol    Allergies    angiotensin converting enzyme inhibitors (Angioedema)  aspirin (Hives; Rash)  lactose (Diarrhea)  penicillin (Angioedema)  Seafood (Short breath)  shellfish (Short breath)  spinach (Unknown)    Intolerances    	    REVIEW OF SYSTEMS:    [x] as per HPI  CONSTITUTIONAL: No fever, weight loss, or fatigue  ENT:  No difficulty hearing, tinnitus, vertigo; No sinus or throat pain  RESPIRATORY: No cough, wheezing, chills or hemoptysis; No Shortness of Breath  CARDIOVASCULAR: No chest pain, palpitations, dizziness, or leg swelling  GASTROINTESTINAL: No abdominal or epigastric pain. No nausea, vomiting, or hematemesis; No diarrhea or constipation. No melena or hematochezia.  GENITOURINARY: No dysuria, frequency, hematuria, or incontinence  NEUROLOGICAL: No headaches, memory loss, loss of strength, numbness, or tremors  MUSCULOSKELETAL: No joint pain or swelling; No muscle, back, or extremity pain  [x] All others negative	  [ ] Unable to obtain    PHYSICAL EXAM:  T(C): 36.8, Max: 37 (05-31 @ 00:58)  HR: 68 (68 - 85)  BP: 162/77 (145/63 - 174/84)  RR: 18 (17 - 18)  SpO2: 99% (98% - 99%)  Wt(kg): --  I&O's Summary    I & Os for current day (as of 31 May 2017 10:44)  =============================================  IN: 100 ml / OUT: 0 ml / NET: 100 ml      Appearance: Normal	  HEENT:   Normal oral mucosa, PERRL, EOMI	  Lymphatic: No lymphadenopathy  Cardiovascular: Normal S1 S2, No JVD, No murmurs, No edema  Respiratory: Lungs clear to auscultation	  Psychiatry: A & O x 3, Mood & affect appropriate  Gastrointestinal:  Soft, Non-tender, + BS	  Skin: No rashes, No ecchymoses, No cyanosis	  Neurologic: Non-focal  Extremities: Normal range of motion, No clubbing, cyanosis or edema  Vascular: Peripheral pulses palpable 2+ bilaterally    TELEMETRY: 	    ECG:  Diagnosis Line Sinus rhythm with short RI  Otherwise normal ECG    ECHO:There is mild concentric left ventricular hypertrophy. The left   ventricular   wall motion is normal.  The left ventricular ejection fraction is   normal. The   left ventricular ejection fraction is 65%.  The left atrial size is   normal.   Right atrial size is normal.The right ventricle is normal in size and   function.Calcified aortic valve. There is Moderate aortic stenosis.The   calculated aortic valve area using the continuity equation is 1.2   cm2.The peak   pressuregradient is 41 mmHg, mean pressure gradient is 21 mmHg. There   is mild   aortic regurgitation.  There is no echocardiographic evidence for   pulmonary   hypertension. The pulmonary artery systolic pressure is estimated to be   30   mmHg.  The inferior vena cava is normal in size (<2.1 cm) with normal   inspiratory collapse (>50%) consistent with normal right atrial   pressure.  No   aortic root dilatation.A trivial pericardial effusion noted.  STRESS:  CATH:  	  RADIOLOGY:  CXR:  CT:  US:   	  	  LABS:	 	    CARDIAC MARKERS:                                  7.3    8.9   )-----------( 263      ( 31 May 2017 03:42 )             22.3     05-31    143  |  101  |  80<H>  ----------------------------<  78  3.3<L>   |  13<L>  |  4.10<H>    Ca    4.5<LL>      31 May 2017 03:42  Phos  7.6     05-31  Mg     .6     05-31    TPro  6.4  /  Alb  3.2<L>  /  TBili  0.4  /  DBili  x   /  AST  16  /  ALT  7<L>  /  AlkPhos  75  05-31    proBNP: Serum Pro-Brain Natriuretic Peptide: 1545 pg/mL (05-31 @ 01:54)    Lipid Profile:   HgA1c:   TSH:     ASSESSMENT/PLAN: 	  Problem/Plan - 1:    ·  Problem: CAD (coronary artery disease).   Troponin peaked 2/2 demand.  No ASA due to ASA allergy. No Plavix due to h/o Anemia. Cont. Coreg, Lipitor  No further ischemia work-up at this time     Echo 1/24/16: mild concentric LVH. apical mild HK 55%, impaired left ventricular relaxation with mildly elevated left atrial pressure (8-14mmHg), Moderate AS (FERDINAND 1.2 cm2), The peak pressure gradient is 27mmHg, mean pressure gradient is 16 mmHg, mild to moderate AR, moderate pericardial effusion noted. EF improved from prior.     Please repeat echo to eval effusion and AI    Problem/Plan - 2:  ·  Problem: Chronic systolic congestive heart failure.  Plan: repeat Echo  No ACEI/ARB due to ACEI allergy and CKD.   Hold Lasix due to acute hypokalemia, acute hypocalcemia and acute hypomagnesemia, in Cr. from baseline on admission. Lungs CTA B/L. Euvolemic.   f/u need for lasix per Renal recs    Problem/Plan - 3:  ·  Problem: Essential hypertension.  Plan: Cont. Coreg, Hydralazine. Lasix on hold.

## 2017-05-31 NOTE — PROGRESS NOTE ADULT - SUBJECTIVE AND OBJECTIVE BOX
Overnight Events: Admitted to [facility].    Subjective: Patient seen and examined at bedside. Reports appetite returned. Denies paresthesias, myalgias, constipation.    [OBJECTIVE]:    Vital Signs:  T(F): , Max: 98.6 (05-31 @ 00:58)  HR:  (68 - 85)  BP:  (145/63 - 174/84)  BP(mean):  (111 - 127)  RR:  (17 - 19)  SpO2:  (98% - 100%) RA     I & Os for 24h ending 05-31 @ 07:00  =============================================  IN: 100 ml / OUT: 0 ml / NET: 100 ml    I & Os for current day (as of 05-31 @ 15:37)  =============================================  IN: 300 ml / OUT: 350 ml / NET: -50 ml    Physical Exam:  T(F): 97.4  HR: 68  BP: 146/65  RR: 19  SpO2: 100% RA    General: AOx3, NAD  HEENT: MMM, neg Chvostek's sign  Respiratory: CTA b/l, no wheezes, rales or rhonchi  Cardiovascular: Regular, +S1 + S2, holosystolic murmur LLSB radiating to clavicles  Abdomen: Soft, NTND, normoactive bowel sounds, no rebound, no guarding  Extremities: WWP, no edema  Neurological: CN II-XII grossly intact, follows commands, moves all extremities    Medications:  MEDICATIONS  (STANDING):  carvedilol 6.25milliGRAM(s) Oral every 12 hours  hydrALAZINE 25milliGRAM(s) Oral three times a day  tamsulosin 0.4milliGRAM(s) Oral at bedtime  predniSONE   Tablet 5milliGRAM(s) Oral daily  heparin  Injectable 5000Unit(s) SubCutaneous every 12 hours  atorvastatin 40milliGRAM(s) Oral at bedtime    MEDICATIONS  (PRN):      Allergies:  Allergies    angiotensin converting enzyme inhibitors (Angioedema)  aspirin (Hives; Rash)  lactose (Diarrhea)  penicillin (Angioedema)  Seafood (Short breath)  shellfish (Short breath)  spinach (Unknown)    Labs:                        7.3    8.9   )-----------( 263      ( 31 May 2017 03:42 )             22.3     05-31    143  |  101  |  81<H>  ----------------------------<  68<L>  3.6   |  11<L>  |  4.00<H>    Ca    4.6<LL>      31 May 2017 11:23  Phos  7.6     05-31  Mg     2.1     05-31    TPro  x   /  Alb  3.1<L>  /  TBili  x   /  DBili  x   /  AST  x   /  ALT  x   /  AlkPhos  x   05-31    ASSESSMENT/PLAN: 85F PMH HTN, CKD 4, Takotsubo cardiomyopathy (EF now 65%), SBO s/p resection 10/2016, anemia, hypocalcemia, NSTEMI, temporal arteritis presented 5/31 in setting of decreased PO intake and malaise, admitted to [facility] for hypocalcemia, hypomagnesemia, and anemia likely 2/2 CKD.    RENAL  #Hypocalcemia: Asymptomatic, QTC wnl on EKG. suspect 2/2 low 1,25 dihydroxy vitamin D 2/2 CKD. S/P 1g calcium gluconate.   - f/u PTH, 25 OH D, 1,25 dihydroxy D  - Dr. Bryan consulted, will f/u recs    #Hypomag; likely 2/2 decreased PO intake vs. PPI, resolved w/ repletion.  - dc PPI  - trend daily    #AG metabolic acidosis- possibly 2/2 uremia and starvation ketoacidosis  - f/u BHB, UA for ketones  - encourage PO intake    HEME: Hgb 7 on admission from 9 on May 6th. Suspect 2/2 ACD 2/2 temporal arteritis as well as CKD. Guaiac negative. Given 1u PRBC.  - trend Hgb  - f/u FOBT    CV  #Demand ischemia: possibly 2/2 ischemia. chest pain on admission resolved, trops downtrended, TWI in V4-V6 resolved. Per Cardiology, no ASA due to ASA allergy, no Plavix due to h/o Anemia, c/w Coreg and Lipitor, no further ischemia work-up at this time   #AS: repeat echo per Cardiology recs  #Essential HTN: c/w home coreg, hydralazine. Holding home Lasix in setting of electrolyte abnormalities.  #Takotsubo cardiomyopathy: EF recovered, 65% in 3/2017    RHEUM  #Temporal arteritis: c/w home prednisone 5mg PO QD    F: none  E: cautious repletion in setting of CKD 4  N: DASH/renal diet    PPX: HSQ BID    DISPO: 7LACH  CODE: DNR/DNI Overnight Events: Admitted to [facility].    Subjective: Patient seen and examined at bedside. Reports appetite returned. Denies paresthesias, myalgias, constipation.    [OBJECTIVE]:    Vital Signs:  T(F): , Max: 98.6 (05-31 @ 00:58)  HR:  (68 - 85)  BP:  (145/63 - 174/84)  BP(mean):  (111 - 127)  RR:  (17 - 19)  SpO2:  (98% - 100%) RA     I & Os for 24h ending 05-31 @ 07:00  =============================================  IN: 100 ml / OUT: 0 ml / NET: 100 ml    I & Os for current day (as of 05-31 @ 15:37)  =============================================  IN: 300 ml / OUT: 350 ml / NET: -50 ml    Physical Exam:  T(F): 97.4  HR: 68  BP: 146/65  RR: 19  SpO2: 100% RA    General: AOx3, NAD  HEENT: MMM, neg Chvostek's sign  Respiratory: CTA b/l, no wheezes, rales or rhonchi  Cardiovascular: Regular, +S1 + S2, holosystolic murmur LLSB radiating to clavicles  Abdomen: Soft, NTND, normoactive bowel sounds, no rebound, no guarding  Extremities: WWP, no edema  Neurological: CN II-XII grossly intact, follows commands, moves all extremities    Medications:  MEDICATIONS  (STANDING):  carvedilol 6.25milliGRAM(s) Oral every 12 hours  hydrALAZINE 25milliGRAM(s) Oral three times a day  tamsulosin 0.4milliGRAM(s) Oral at bedtime  predniSONE   Tablet 5milliGRAM(s) Oral daily  heparin  Injectable 5000Unit(s) SubCutaneous every 12 hours  atorvastatin 40milliGRAM(s) Oral at bedtime    MEDICATIONS  (PRN):      Allergies:  Allergies    angiotensin converting enzyme inhibitors (Angioedema)  aspirin (Hives; Rash)  lactose (Diarrhea)  penicillin (Angioedema)  Seafood (Short breath)  shellfish (Short breath)  spinach (Unknown)    Labs:                        7.3    8.9   )-----------( 263      ( 31 May 2017 03:42 )             22.3     05-31    143  |  101  |  81<H>  ----------------------------<  68<L>  3.6   |  11<L>  |  4.00<H>    Ca    4.6<LL>      31 May 2017 11:23  Phos  7.6     05-31  Mg     2.1     05-31    TPro  x   /  Alb  3.1<L>  /  TBili  x   /  DBili  x   /  AST  x   /  ALT  x   /  AlkPhos  x   05-31    ASSESSMENT/PLAN: 85F PMH HTN, CKD 4, Takotsubo cardiomyopathy (EF now 65%), SBO s/p resection 10/2016, anemia, hypocalcemia, NSTEMI, temporal arteritis presented 5/31 in setting of decreased PO intake and malaise, admitted to [facility] for hypocalcemia, hypomagnesemia, and anemia likely 2/2 CKD.    RENAL  #Hypocalcemia: Asymptomatic, QTC wnl on EKG. suspect 2/2 low 1,25 dihydroxy vitamin D 2/2 CKD. S/P 1g calcium gluconate.   - f/u PTH, 25 OH D, 1,25 dihydroxy D  - Dr. Bryan consulted, will f/u recs including UPEP as appears to have nephrotic range proteinuria, renal U/S    #Hypomag; likely 2/2 decreased PO intake vs. PPI, resolved w/ repletion.  - dc PPI  - trend daily    #AG metabolic acidosis- possibly 2/2 uremia and starvation ketoacidosis  - f/u BHB, UA for ketones  - encourage PO intake    HEME: Hgb 7 on admission from 9 on May 6th. Suspect 2/2 ACD 2/2 temporal arteritis as well as CKD. Guaiac negative. Given 1u PRBC.  - trend Hgb  - f/u FOBT    CV  #Demand ischemia: possibly 2/2 ischemia. chest pain on admission resolved, trops downtrended, TWI in V4-V6 resolved. Per Cardiology, no ASA due to ASA allergy, no Plavix due to h/o Anemia, c/w Coreg and Lipitor, no further ischemia work-up at this time   #AS: repeat echo per Cardiology recs  #Essential HTN: c/w home coreg, hydralazine. Holding home Lasix in setting of electrolyte abnormalities.  #Takotsubo cardiomyopathy: EF recovered, 65% in 3/2017    RHEUM  #Temporal arteritis: c/w home prednisone 5mg PO QD    F: none  E: cautious repletion in setting of CKD 4  N: DASH/renal diet    PPX: HSQ BID    DISPO: 7LACH  CODE: DNR/DNI

## 2017-05-31 NOTE — H&P ADULT - HISTORY OF PRESENT ILLNESS
85F PMH COPD, CKD 4, takotsubo's ef improved, sbo surgically improved, anemia, hypocalcemia, nstemi managed medically one year ago presenting with chest pain. Intermittent, midsternal. Brought in by daughter as for reduced PO intake. Over the last several days patient says she has lost her appetite and is not taking her medications. She states her strength is good. No paresthesias. No episodes of confusion. ROS otherwise negative for fever/chills, sob, nausea, vomiting, dysuria, focal weakness. Has chronic soft stools following bowel resection from SBO.     ED VS: 98.6 174/84 85 17 99 on RA. New TWI in V4-6. Chest pain resolved. CXR clear. Hgb 7.3. Mg .6, iron 11, calcium 4.5. Crt 4.1 at baseline. Trops .23. Given 1g calcium, then 4g magnesium in ED. NS 1L bolus, plavix 75.   ICU consult called for hypocalcemia, admitted to . 85F PMH htn, CKD 4, takotsubo's ef improved, sbo surgically repaired, anemia, hypocalcemia, nstemi managed medically one year ago presenting with chest pain. Intermittent, midsternal. Brought in by daughter as for reduced PO intake. Over the last several days patient says she has lost her appetite and is not taking her medications. She states her strength is good. No paresthesias. No episodes of confusion. ROS otherwise negative for fever/chills, sob, nausea, vomiting, dysuria, focal weakness. Has chronic soft stools following bowel resection from SBO.     ED VS: 98.6 174/84 85 17 99 on RA. New TWI in V4-6. Chest pain resolved. CXR clear. Hgb 7.3. Mg .6, iron 11, calcium 4.5. Crt 4.1 at baseline. Trops .23. Given 1g calcium, then 4g magnesium in ED. NS 1L bolus, plavix 75.   ICU consult called for hypocalcemia, admitted to 7L. 85F PMH htn, CKD 4, takotsubo's ef improved, sbo surgically repaired, anemia, hypocalcemia, nstemi managed medically one year ago presenting with chest pain. Intermittent, midsternal. Brought in by daughter as for reduced PO intake. Over the last several days patient says she has lost her appetite and is not taking her medications. She states her strength is good. No paresthesias. No episodes of confusion. ROS otherwise negative for fever/chills, dizzyness, blurry vision,sob, CP, nausea, vomiting, dysuria, focal weakness, rash. Has chronic soft stools following bowel resection from SBO.     ED VS: 98.6 174/84 85 17 99 on RA. New TWI in V4-6. Chest pain resolved. CXR clear. Hgb 7.3. Mg .6, iron 11, calcium 4.5. Crt 4.1 at baseline. Trops .23. Given 1g calcium, then 4g magnesium in ED. NS 1L bolus, plavix 75.   ICU consult called for hypocalcemia, admitted to .

## 2017-05-31 NOTE — H&P ADULT - PROBLEM SELECTOR PLAN 4
Etiology: likely uremia with starvation ketoacidosis (given poor PO intake history)  Check ketones for confirmation of diagnosis  Respiratory status stable, breathing comfortably

## 2017-05-31 NOTE — ED PROVIDER NOTE - PROGRESS NOTE DETAILS
Concern for ekg changes, will give plavix po. Pt has rec'd in the past, hx of asa allergy. Will give fluids slowly given hx of CHF new ekg changes and elevated trops and decrease calcium will repleat and admit to cards dr da silva aware

## 2017-05-31 NOTE — H&P ADULT - PROBLEM SELECTOR PLAN 1
Hypocalcemia likely multifactorial from poor nutritional intake, bowel resection with reduced absorption, hypomagnesemia, vitamin D deficiency  Hyperphosphatemia in setting of renal disease  Correct magnesium then correct calcium with aggressive repletion, recheck electrolytes  Currently asymptomatic  Check vitamin D level  PTH elevated in past Hypocalcemia likely multifactorial from poor nutritional intake, bowel resection with reduced absorption, hypomagnesemia, vitamin D deficiency  Hyperphosphatemia in setting of renal disease  Correct magnesium then correct calcium with aggressive repletion, recheck electrolytes  Currently asymptomatic  Check vitamin D level  PTH elevated in past  f/u PTH Hypocalcemia likely multifactorial from poor nutritional intake, bowel resection with reduced absorption, hypomagnesemia, vitamin D deficiency but mostl likely the latter with the worsening renal failure  Hyperphosphatemia in setting of renal disease  Correct magnesium then correct calcium with aggressive repletion, recheck electrolytes  Currently asymptomatic  Check vitamin D level, 25 OH and 1,25 dihydroxy D3  PTH elevated in past  f/u PTH

## 2017-05-31 NOTE — H&P ADULT - PROBLEM SELECTOR PLAN 2
Likely combo of iron deficiency and ACD, renal disease  Iron at 11, not taking supplementation at home  Likely with bowel resection reduced absorption  IV iron infusions  Guaiac negative, low suspicion for acute bleed at this time given history and exam  DC PPI  Transfuse 1U PRBC, re-check CBC Likely combo of iron deficiency and ACD, renal disease  Iron at 11, not taking supplementation at home  Likely with bowel resection reduced absorption  IV iron infusions  Guaiac negative, low suspicion for acute bleed at this time given history and exam  DC PPI  Transfuse 1U PRBC Likely combo of iron deficiency and ACD, renal disease  Iron at 11, not taking supplementation at home but ferritin high; hard to confirm iron deficiency without marrow  Likely with bowel resection reduced absorption  IV iron infusions  Guaiac negative, low suspicion for acute bleed at this time given history and exam  DC PPI  Transfuse 1U PRBC

## 2017-06-01 LAB
ALBUMIN SERPL ELPH-MCNC: 2.9 G/DL — LOW (ref 3.3–5)
ANION GAP SERPL CALC-SCNC: 25 MMOL/L — HIGH (ref 5–17)
APPEARANCE UR: (no result)
B-OH-BUTYR SERPL-SCNC: 1 MMOL/L — HIGH
BACTERIA # UR AUTO: (no result) /HPF
BILIRUB UR-MCNC: NEGATIVE — SIGNIFICANT CHANGE UP
BUN SERPL-MCNC: 82 MG/DL — HIGH (ref 7–23)
CA-I BLD-SCNC: 0.69 MMOL/L — CRITICAL LOW (ref 1.05–1.34)
CALCIUM SERPL-MCNC: 4.8 MG/DL — CRITICAL LOW (ref 8.4–10.5)
CHLORIDE SERPL-SCNC: 102 MMOL/L — SIGNIFICANT CHANGE UP (ref 96–108)
CO2 SERPL-SCNC: 15 MMOL/L — LOW (ref 22–31)
COLOR SPEC: YELLOW — SIGNIFICANT CHANGE UP
CREAT ?TM UR-MCNC: 74 MG/DL — SIGNIFICANT CHANGE UP
CREAT SERPL-MCNC: 4.1 MG/DL — HIGH (ref 0.5–1.3)
DIFF PNL FLD: NEGATIVE — SIGNIFICANT CHANGE UP
EPI CELLS # UR: SIGNIFICANT CHANGE UP /HPF
ERYTHROCYTE [SEDIMENTATION RATE] IN BLOOD: 92 MM/HR — HIGH
GLUCOSE SERPL-MCNC: 105 MG/DL — HIGH (ref 70–99)
GLUCOSE UR QL: NEGATIVE — SIGNIFICANT CHANGE UP
HCT VFR BLD CALC: 22.2 % — LOW (ref 34.5–45)
HGB BLD-MCNC: 7.6 G/DL — LOW (ref 11.5–15.5)
KETONES UR-MCNC: NEGATIVE — SIGNIFICANT CHANGE UP
LEUKOCYTE ESTERASE UR-ACNC: (no result)
MAGNESIUM SERPL-MCNC: 2 MG/DL — SIGNIFICANT CHANGE UP (ref 1.6–2.6)
MCHC RBC-ENTMCNC: 25.2 PG — LOW (ref 27–34)
MCHC RBC-ENTMCNC: 34.2 G/DL — SIGNIFICANT CHANGE UP (ref 32–36)
MCV RBC AUTO: 73.5 FL — LOW (ref 80–100)
NITRITE UR-MCNC: NEGATIVE — SIGNIFICANT CHANGE UP
OSMOLALITY UR: 325 MOSMOL/KG — SIGNIFICANT CHANGE UP (ref 100–650)
PH UR: 5.5 — SIGNIFICANT CHANGE UP (ref 5–8)
PHOSPHATE SERPL-MCNC: 6.3 MG/DL — HIGH (ref 2.5–4.5)
PLATELET # BLD AUTO: 198 K/UL — SIGNIFICANT CHANGE UP (ref 150–400)
POTASSIUM SERPL-MCNC: 3.4 MMOL/L — LOW (ref 3.5–5.3)
POTASSIUM SERPL-SCNC: 3.4 MMOL/L — LOW (ref 3.5–5.3)
PROT SERPL-MCNC: 5.3 G/DL — LOW (ref 6–8.3)
PROT SERPL-MCNC: 5.3 G/DL — LOW (ref 6–8.3)
PROT UR-MCNC: 100 MG/DL
RBC # BLD: 3.02 M/UL — LOW (ref 3.8–5.2)
RBC # FLD: 20.3 % — HIGH (ref 10.3–16.9)
RBC CASTS # UR COMP ASSIST: < 5 /HPF — SIGNIFICANT CHANGE UP
SODIUM SERPL-SCNC: 142 MMOL/L — SIGNIFICANT CHANGE UP (ref 135–145)
SODIUM UR-SCNC: 57 MMOL/L — SIGNIFICANT CHANGE UP
SP GR SPEC: 1.01 — SIGNIFICANT CHANGE UP (ref 1–1.03)
UROBILINOGEN FLD QL: 0.2 E.U./DL — SIGNIFICANT CHANGE UP
WBC # BLD: 8.5 K/UL — SIGNIFICANT CHANGE UP (ref 3.8–10.5)
WBC # FLD AUTO: 8.5 K/UL — SIGNIFICANT CHANGE UP (ref 3.8–10.5)
WBC UR QL: (no result) /HPF

## 2017-06-01 PROCEDURE — 99233 SBSQ HOSP IP/OBS HIGH 50: CPT

## 2017-06-01 PROCEDURE — 93010 ELECTROCARDIOGRAM REPORT: CPT

## 2017-06-01 PROCEDURE — 99232 SBSQ HOSP IP/OBS MODERATE 35: CPT

## 2017-06-01 PROCEDURE — 99233 SBSQ HOSP IP/OBS HIGH 50: CPT | Mod: GC

## 2017-06-01 PROCEDURE — 76775 US EXAM ABDO BACK WALL LIM: CPT | Mod: 26

## 2017-06-01 RX ORDER — POLYETHYLENE GLYCOL 3350 17 G/17G
17 POWDER, FOR SOLUTION ORAL DAILY
Qty: 0 | Refills: 0 | Status: DISCONTINUED | OUTPATIENT
Start: 2017-06-01 | End: 2017-06-02

## 2017-06-01 RX ORDER — CALCIUM CARBONATE 500(1250)
1 TABLET ORAL EVERY 8 HOURS
Qty: 0 | Refills: 0 | Status: DISCONTINUED | OUTPATIENT
Start: 2017-06-01 | End: 2017-06-01

## 2017-06-01 RX ORDER — ERYTHROPOIETIN 10000 [IU]/ML
10000 INJECTION, SOLUTION INTRAVENOUS; SUBCUTANEOUS
Qty: 0 | Refills: 0 | Status: DISCONTINUED | OUTPATIENT
Start: 2017-06-01 | End: 2017-06-02

## 2017-06-01 RX ORDER — CALCIUM CARBONATE 500(1250)
1 TABLET ORAL EVERY 6 HOURS
Qty: 0 | Refills: 0 | Status: DISCONTINUED | OUTPATIENT
Start: 2017-06-01 | End: 2017-06-02

## 2017-06-01 RX ORDER — CALCIUM ACETATE 667 MG
1334 TABLET ORAL
Qty: 0 | Refills: 0 | Status: DISCONTINUED | OUTPATIENT
Start: 2017-06-01 | End: 2017-06-02

## 2017-06-01 RX ORDER — POTASSIUM CHLORIDE 20 MEQ
40 PACKET (EA) ORAL ONCE
Qty: 0 | Refills: 0 | Status: COMPLETED | OUTPATIENT
Start: 2017-06-01 | End: 2017-06-01

## 2017-06-01 RX ORDER — FERROUS SULFATE 325(65) MG
325 TABLET ORAL DAILY
Qty: 0 | Refills: 0 | Status: DISCONTINUED | OUTPATIENT
Start: 2017-06-01 | End: 2017-06-02

## 2017-06-01 RX ORDER — ASCORBIC ACID 60 MG
500 TABLET,CHEWABLE ORAL DAILY
Qty: 0 | Refills: 0 | Status: DISCONTINUED | OUTPATIENT
Start: 2017-06-01 | End: 2017-06-02

## 2017-06-01 RX ADMIN — HEPARIN SODIUM 5000 UNIT(S): 5000 INJECTION INTRAVENOUS; SUBCUTANEOUS at 17:01

## 2017-06-01 RX ADMIN — Medication 1334 MILLIGRAM(S): at 12:07

## 2017-06-01 RX ADMIN — Medication 1334 MILLIGRAM(S): at 17:01

## 2017-06-01 RX ADMIN — Medication 5 MILLIGRAM(S): at 06:29

## 2017-06-01 RX ADMIN — Medication 325 MILLIGRAM(S): at 17:01

## 2017-06-01 RX ADMIN — ERYTHROPOIETIN 10000 UNIT(S): 10000 INJECTION, SOLUTION INTRAVENOUS; SUBCUTANEOUS at 14:44

## 2017-06-01 RX ADMIN — Medication 500 MILLIGRAM(S): at 17:01

## 2017-06-01 RX ADMIN — Medication 1 TABLET(S): at 17:05

## 2017-06-01 RX ADMIN — Medication 25 MILLIGRAM(S): at 21:55

## 2017-06-01 RX ADMIN — Medication 40 MILLIEQUIVALENT(S): at 12:09

## 2017-06-01 RX ADMIN — CARVEDILOL PHOSPHATE 6.25 MILLIGRAM(S): 80 CAPSULE, EXTENDED RELEASE ORAL at 19:25

## 2017-06-01 RX ADMIN — Medication 25 MILLIGRAM(S): at 13:32

## 2017-06-01 RX ADMIN — HEPARIN SODIUM 5000 UNIT(S): 5000 INJECTION INTRAVENOUS; SUBCUTANEOUS at 06:29

## 2017-06-01 RX ADMIN — CALCITRIOL 0.5 MICROGRAM(S): 0.5 CAPSULE ORAL at 12:06

## 2017-06-01 RX ADMIN — POLYETHYLENE GLYCOL 3350 17 GRAM(S): 17 POWDER, FOR SOLUTION ORAL at 17:00

## 2017-06-01 RX ADMIN — TAMSULOSIN HYDROCHLORIDE 0.4 MILLIGRAM(S): 0.4 CAPSULE ORAL at 21:55

## 2017-06-01 RX ADMIN — ATORVASTATIN CALCIUM 40 MILLIGRAM(S): 80 TABLET, FILM COATED ORAL at 21:55

## 2017-06-01 RX ADMIN — Medication 25 MILLIGRAM(S): at 06:29

## 2017-06-01 RX ADMIN — CARVEDILOL PHOSPHATE 6.25 MILLIGRAM(S): 80 CAPSULE, EXTENDED RELEASE ORAL at 07:31

## 2017-06-01 NOTE — PROGRESS NOTE ADULT - SUBJECTIVE AND OBJECTIVE BOX
TRANSFER ACCEPTANCE NOTE FROM [facility] TO [facility] / BRIEF HOSPITAL COURSE    SUBJECTIVE  85F PMH HTN, CKD 4, NSTEMI, Takotsubo cardiomyopathy (EF now 65% from 25%), SBO s/p resection 10/2016, anemia, hypocalcemia, temporal arteritis presented 5/31 in setting of decreased PO intake and malaise who was initially admitted to [facility] for hypocalcemia (4.9 corrected) and hypomagnesemia (0.6) likely multifactorial 2/2 CKD and poor oral intake. Total, the patient received 1g CaGlu, 6g MgSO4, and 60 KCL PO. . QTc initially was 470. Repeat after improvement of calcium to higher level was 511 however this was thought to be an error. Repeat EKG this afternoon with Qtc at 472. In regards to the patients chest pain, the patient was transfused 1 unit of PRBC for anemia in the ED because of concern for demand ischemia given history of NSTEMI which is currently not thought to be the case. EKG w/o clear ischemic changes (TWI noted by admission team not meeting significant TWI criteria). Troponin peaked. TTE showed that EF has improved to 65%; diastolic dysfx; moderate AS; moderate MR; moderate pericardial effusion w/o wall motion abnormalities. The patient on presentation and throughout admission did not have paresthesias, numbness, twitching, or palpitations. Her chest pain has resolved. No sob, n, v, d, f, c. Cardio on board and feel that ischemic workup can stop. Recommendation of no ASA, plavix, ACE-I/ARB given intolerance / anemia / CKD. The patient is being seen by renal as well who made recommendations to workup plasma cell dyscrasia given nephrotic range proteinuria w/ SPEP, serum/urine immunofixation. Also recommend procrit, oral calcium supplementation and oral iron supplementation. Patient deemed optimized for step down to [facility].     SUBJECTIVE  No acute complaints.   Denies paresthesias, numbness, twitching, or palpitations. Her chest pain has resolved. No sob, n, v, d, f, c.     angiotensin converting enzyme inhibitors (Angioedema)  aspirin (Hives; Rash)  lactose (Diarrhea)  penicillin (Angioedema)  Seafood (Short breath)  shellfish (Short breath)  spinach (Unknown)    OBJECTIVE  PHYSICAL EXAM:  T(C): 36.9, Max: 37.2 (06-01 @ 01:56)  HR: 69 (66 - 84)  BP: 136/66 (120/63 - 159/70)  RR: 17 (14 - 18)  SpO2: 99% (97% - 100%)    I&O's Summary  I & Os for 24h ending 01 Jun 2017 07:00  =============================================  IN: 600 ml / OUT: 950 ml / NET: -350 ml    I & Os for current day (as of 01 Jun 2017 15:37)  =============================================  IN: 270 ml / OUT: 600 ml / NET: -330 ml      Appearance: NAD. Speaking in full sentences.   HEENT:   Conjunctiva clear b/l. Moist oral mucosa.  Cardiovascular: RRR with no m/r/g. No JVD. No LE edema b/l.   Respiratory: Lungs CTAB.  Gastrointestinal:  Soft, nontender. No rebound. No rigidity. 	  Extremities: No edema b/l. No erythema b/l. No calf tenderness b/l. Armando's sign negative b/l.  Vascular: DP 2+ b/l.  Skin: No rashes, No ecchymoses, No cyanosis. 	  Neurologic:  A&Ox3. Non-focal. Moving all extremities. Following commands.  	  LABS:                        7.6    8.5   )-----------( 198      ( 01 Jun 2017 05:43 )             22.2     06-01    142  |  102  |  82<H>  ----------------------------<  105<H>  3.4<L>   |  15<L>  |  4.10<H>    Ca    4.8<LL>      01 Jun 2017 05:49  Phos  6.3     06-01  Mg     2.0     06-01    TPro  x   /  Alb  2.9<L>  /  TBili  x   /  DBili  x   /  AST  x   /  ALT  x   /  AlkPhos  x   06-01      Urinalysis Basic - ( 01 Jun 2017 14:08 )    Color: x / Appearance: x / SG: x / pH: x  Gluc: x / Ketone: x  / Bili: x / Urobili: x   Blood: x / Protein: x / Nitrite: x   Leuk Esterase: x / RBC: < 5 /HPF / WBC Many /HPF   Sq Epi: x / Non Sq Epi: Rare /HPF / Bacteria: Many /HPF    RADIOLOGY & ADDITIONAL TESTS:  Reviewed by myself and with medical team.    MEDICATIONS  (STANDING):  carvedilol 6.25milliGRAM(s) Oral every 12 hours  hydrALAZINE 25milliGRAM(s) Oral three times a day  tamsulosin 0.4milliGRAM(s) Oral at bedtime  predniSONE   Tablet 5milliGRAM(s) Oral daily  heparin  Injectable 5000Unit(s) SubCutaneous every 12 hours  atorvastatin 40milliGRAM(s) Oral at bedtime  calcitriol   Capsule 0.5MICROGram(s) Oral daily  epoetin suhas Injectable 93686Ahts(s) SubCutaneous every 7 days  calcium acetate 1334milliGRAM(s) Oral three times a day with meals  calcium carbonate 648 mG 1Tablet(s) Oral every 8 hours  ferrous    sulfate 325milliGRAM(s) Oral daily  ascorbic acid 500milliGRAM(s) Oral daily  polyethylene glycol 3350 17Gram(s) Oral daily      ASSESSMENT/PLAN:   85F PMH HTN, CKD 4, NSTEMI, Takotsubo cardiomyopathy (EF now 65% from 25%), SBO s/p resection 10/2016, anemia, hypocalcemia, temporal arteritis presented 5/31 in setting of decreased PO intake and malaise who was initially admitted to [facility] for hypocalcemia (4.9 corrected) and hypomagnesemia (0.6) likely multifactorial. Hypocalcemia from Suspect 2/2 secondary poor PO intake, CKD, hyperphosphatemia, and vit D def. Hypomagnesemia likely from poor po intake vs PPI.     RENAL / METABOLIC   #Hypocalcemia -- asymptomatic w/ nrml WTC. Suspect 2/2 secondary poor PO intake, CKD, hyperphosphatemia, and vit D def.   - S/P 1g calcium gluconate on admission.   - calcitriol   Capsule 0.5MICROGram(s) Oral daily  - calcium acetate 1334milliGRAM(s) Oral three times a day with meals  - calcium carbonate 648 mG 1Tablet(s) Oral every 8 hours  - Consider daily EKG.     #Hypomagnesemia -- likely 2/2 decreased PO intake vs. PPI, resolved w/ repletion.  - dc PPI  - trend Mg daily    #CKD - presumably 2/2 HTN, appears to have nephrotic-range proteinuria.  - Dr. Bryan following, appreciate recs  - Renal US  - F/u plasma cell dyscrasia w/u.   - Trend all lytes daily, even phosphorus.     #Anion gap metabolic acidosis -- likely from CKD. 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   -Manage above conditions.  -BMP daily.  -Renal on board.     HEME:   #Microcytic Anemia -- Hgb 7 on admission from 9 on May 6th. Suspect 2/2 ACD 2/2 temporal arteritis (ESR elevated to 92) as well as CKD. Guaiac negative. The patient was transfused 1 unit of PRBC for anemia in the ED because of concern for demand ischemia given history of NSTEMI which is currently not thought to be the case. EKG w/o clear ischemic changes (TWI noted by admission team not meeting significant TWI criteria). Troponin peaked.  - Dr. Bryan following, appreciate recs  - epoetin suhas Injectable 74192Mnio(s) SubCutaneous every 7 days  - ferrous    sulfate 325milliGRAM(s) Oral daily  - ascorbic acid 500milliGRAM(s) Oral daily  - polyethylene glycol 3350 17Gram(s) Oral daily  - trend Hgb daily, keep type and screen current, maintain two large bore IV lines.   - f/u FOBT  - F/u reticulocyte count.  - F/u plasma cell dyscrasia w/u     CV  #Chest pain (RESOLVED) -- unlikely ACS.   -Stat EKG if CP again.  -Cardio on board - no more ischemic w/u.     #Troponinemia -- possibly 2/2 demand ischemia. Chest pain on admission resolved. Trops downtrended. TWI in admission NONSPECIFIC.   -Cards on board -- no need for further ischemic workup. Other recs appreciated     #Moderate AS -- compensated.   -echo repeated per Dr. Jones recs to monitor AS  -repeat echo w/ stable moderate AS.     #Essential HTN  -carvedilol 6.25milliGRAM(s) Oral every 12 hours  -hydrALAZINE 25milliGRAM(s) Oral three times a day    #Takotsubo cardiomyopathy (RESOLVED): EF recovered, 65% in 3/2017  -Repeat ECHO as above.     #Moderate pericardial effusion -- no signs of tamponade on exam.   -Repeat ECHO as above.       #BPH  -C/w flomax    RHEUM  #Temporal arteritis:  - c/w home prednisone 5mg PO QD  - will attempt to obtain collateral from pt's outpt rheumatologist    #F: none  #E: cautious repletion in setting of CKD 4  #N: DASH/renal diet, f/u Nutrition consult given poor appetite at home    #PPX: HSQ    #DISPO:  pending above.     #CODE: DNR/DNI TRANSFER ACCEPTANCE NOTE FROM [facility] TO [facility] / BRIEF HOSPITAL COURSE    SUBJECTIVE  85F PMH HTN, CKD 4, NSTEMI, Takotsubo cardiomyopathy (EF now 65% from 25%), SBO s/p resection 10/2016, anemia, hypocalcemia, temporal arteritis presented 5/31 in setting of decreased PO intake and malaise who was initially admitted to [facility] for hypocalcemia (4.9 corrected) and hypomagnesemia (0.6) likely multifactorial 2/2 CKD and poor oral intake. Total, the patient received 1g CaGlu, 6g MgSO4, and 60 KCL PO. . QTc initially was 470. Repeat after improvement of calcium to higher level was 511 however this was thought to be an error. Repeat EKG this afternoon with Qtc at 472. In regards to the patients chest pain, the patient was transfused 1 unit of PRBC for anemia in the ED because of concern for demand ischemia given history of NSTEMI which is currently not thought to be the case. EKG w/o clear ischemic changes (TWI noted by admission team not meeting significant TWI criteria). Troponin peaked. TTE showed that EF has improved to 65%; diastolic dysfx; moderate AS; moderate MR; moderate pericardial effusion w/o wall motion abnormalities. The patient on presentation and throughout admission did not have paresthesias, numbness, twitching, or palpitations. Her chest pain has resolved. No sob, n, v, d, f, c. Cardio on board and feel that ischemic workup can stop. Recommendation of no ASA, plavix, ACE-I/ARB given intolerance / anemia / CKD. The patient is being seen by renal as well who made recommendations to workup plasma cell dyscrasia given nephrotic range proteinuria w/ SPEP, serum/urine immunofixation. Also recommend procrit, oral calcium supplementation and oral iron supplementation. Patient deemed optimized for step down to [facility].     SUBJECTIVE  No acute complaints.   Denies paresthesias, numbness, twitching, or palpitations. Her chest pain has resolved. No sob, n, v, d, f, c.     angiotensin converting enzyme inhibitors (Angioedema)  aspirin (Hives; Rash)  lactose (Diarrhea)  penicillin (Angioedema)  Seafood (Short breath)  shellfish (Short breath)  spinach (Unknown)    OBJECTIVE  PHYSICAL EXAM:  T(C): 36.9, Max: 37.2 (06-01 @ 01:56)  HR: 69 (66 - 84)  BP: 136/66 (120/63 - 159/70)  RR: 17 (14 - 18)  SpO2: 99% (97% - 100%)    I&O's Summary  I & Os for 24h ending 01 Jun 2017 07:00  =============================================  IN: 600 ml / OUT: 950 ml / NET: -350 ml    I & Os for current day (as of 01 Jun 2017 15:37)  =============================================  IN: 270 ml / OUT: 600 ml / NET: -330 ml      Appearance: NAD. Speaking in full sentences.   HEENT:   Conjunctiva clear b/l. Moist oral mucosa.  Cardiovascular: RRR with no m/r/g. No JVD. No LE edema b/l.   Respiratory: Lungs CTAB.  Gastrointestinal:  Soft, nontender. No rebound. No rigidity. 	  Extremities: No edema b/l. No erythema b/l. No calf tenderness b/l. Armando's sign negative b/l.  Vascular: DP 2+ b/l.  Skin: No rashes, No ecchymoses, No cyanosis. 	  Neurologic:  A&Ox3. Non-focal. Moving all extremities. Following commands.  	  LABS:                        7.6    8.5   )-----------( 198      ( 01 Jun 2017 05:43 )             22.2     06-01    142  |  102  |  82<H>  ----------------------------<  105<H>  3.4<L>   |  15<L>  |  4.10<H>    Ca    4.8<LL>      01 Jun 2017 05:49  Phos  6.3     06-01  Mg     2.0     06-01    TPro  x   /  Alb  2.9<L>  /  TBili  x   /  DBili  x   /  AST  x   /  ALT  x   /  AlkPhos  x   06-01      Urinalysis Basic - ( 01 Jun 2017 14:08 )    Color: x / Appearance: x / SG: x / pH: x  Gluc: x / Ketone: x  / Bili: x / Urobili: x   Blood: x / Protein: x / Nitrite: x   Leuk Esterase: x / RBC: < 5 /HPF / WBC Many /HPF   Sq Epi: x / Non Sq Epi: Rare /HPF / Bacteria: Many /HPF    RADIOLOGY & ADDITIONAL TESTS:  Reviewed by myself and with medical team.    MEDICATIONS  (STANDING):  carvedilol 6.25milliGRAM(s) Oral every 12 hours  hydrALAZINE 25milliGRAM(s) Oral three times a day  tamsulosin 0.4milliGRAM(s) Oral at bedtime  predniSONE   Tablet 5milliGRAM(s) Oral daily  heparin  Injectable 5000Unit(s) SubCutaneous every 12 hours  atorvastatin 40milliGRAM(s) Oral at bedtime  calcitriol   Capsule 0.5MICROGram(s) Oral daily  epoetin suhas Injectable 58358Kukw(s) SubCutaneous every 7 days  calcium acetate 1334milliGRAM(s) Oral three times a day with meals  calcium carbonate 648 mG 1Tablet(s) Oral every 8 hours  ferrous    sulfate 325milliGRAM(s) Oral daily  ascorbic acid 500milliGRAM(s) Oral daily  polyethylene glycol 3350 17Gram(s) Oral daily      ASSESSMENT/PLAN:   85F PMH HTN, CKD 4, NSTEMI, Takotsubo cardiomyopathy (EF now 65% from 25%), SBO s/p resection 10/2016, anemia, hypocalcemia, temporal arteritis presented 5/31 in setting of decreased PO intake and malaise who was initially admitted to [facility] for hypocalcemia (4.9 corrected) and hypomagnesemia (0.6) likely multifactorial. Hypocalcemia from Suspect 2/2 secondary poor PO intake, CKD, hyperphosphatemia, and vit D def. Hypomagnesemia likely from poor po intake vs PPI.     RENAL / METABOLIC   #Hypocalcemia -- asymptomatic w/ nrml WTC. Suspect 2/2 secondary poor PO intake, CKD, hyperphosphatemia, and vit D def.   - S/P 1g calcium gluconate on admission.   - calcitriol   Capsule 0.5MICROGram(s) Oral daily  - calcium acetate 1334milliGRAM(s) Oral three times a day with meals  - calcium carbonate 648 mG 1Tablet(s) Oral every 8 hours  - Repeat EKG in AM x 1.   - Goal corrected Ca >7     #Hypomagnesemia -- likely 2/2 decreased PO intake vs. PPI, resolved w/ repletion.  - dc PPI  - trend Mg daily    #CKD - presumably 2/2 HTN, appears to have nephrotic-range proteinuria.  - Dr. Bryan following, appreciate recs  - Renal US  - F/u plasma cell dyscrasia w/u.   - Trend all lytes daily, even phosphorus.     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     #Anion gap metabolic acidosis -- likely from CKD. 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   -Manage above conditions.  -BMP daily.  -Renal on board.     HEME:   #Microcytic Anemia -- Hgb 7 on admission from 9 on May 6th. Suspect 2/2 ACD 2/2 temporal arteritis (ESR elevated to 92) as well as CKD. Guaiac negative. The patient was transfused 1 unit of PRBC for anemia in the ED because of concern for demand ischemia given history of NSTEMI which is currently not thought to be the case. EKG w/o clear ischemic changes (TWI noted by admission team not meeting significant TWI criteria). Troponin peaked.  - Dr. Bryan following, appreciate recs  - epoetin suhas Injectable 64941Vdyx(s) SubCutaneous every 7 days  - ferrous    sulfate 325milliGRAM(s) Oral daily  - ascorbic acid 500milliGRAM(s) Oral daily  - polyethylene glycol 3350 17Gram(s) Oral daily  - trend Hgb daily, keep type and screen current, maintain two large bore IV lines.   - f/u FOBT  - F/u reticulocyte count.  - F/u plasma cell dyscrasia w/u     CV  #Chest pain (RESOLVED) -- unlikely ACS.   -Stat EKG if CP again.  -Cardio on board - no more ischemic w/u.     #Troponinemia -- possibly 2/2 demand ischemia. Chest pain on admission resolved. Trops downtrended. TWI in admission NONSPECIFIC.   -Cards on board -- no need for further ischemic workup. Other recs appreciated     #Moderate AS -- compensated.   -echo repeated per Dr. Jones recs to monitor AS  -repeat echo w/ stable moderate AS.     #Essential HTN  -carvedilol 6.25milliGRAM(s) Oral every 12 hours  -hydrALAZINE 25milliGRAM(s) Oral three times a day -- can increase to home dose of 50.     #Takotsubo cardiomyopathy (RESOLVED): EF recovered, 65% in 3/2017  -Repeat ECHO as above.     #Moderate pericardial effusion -- no signs of tamponade on exam. VSS.   -Repeat ECHO as above.       #BPH  -C/w flomax    RHEUM  #Temporal arteritis:  - c/w home prednisone 5mg PO QD  - will attempt to obtain collateral from pt's outpt rheumatologist    #F: none  #E: cautious repletion in setting of CKD 4  #N: DASH/renal diet, f/u Nutrition consult given poor appetite at home    #PPX: HSQ    #DISPO:  PT recs and pending above.     #CODE: DNR/DNI TRANSFER ACCEPTANCE NOTE FROM [facility] TO [facility] / BRIEF HOSPITAL COURSE    SUBJECTIVE  85F PMH HTN, CKD 4, NSTEMI, Takotsubo cardiomyopathy (EF now 65% from 25%), SBO s/p resection 10/2016, anemia, hypocalcemia, temporal arteritis presented 5/31 in setting of decreased PO intake and malaise who was initially admitted to [facility] for hypocalcemia (4.9 corrected) and hypomagnesemia (0.6) likely multifactorial 2/2 CKD and poor oral intake. Total, the patient received 1g CaGlu, 6g MgSO4, and 60 KCL PO. . QTc initially was 470. Repeat after improvement of calcium to higher level was 511 however this was thought to be an error. Repeat EKG this afternoon with Qtc at 472. In regards to the patients chest pain, the patient was transfused 1 unit of PRBC for anemia in the ED because of concern for demand ischemia given history of NSTEMI which is currently not thought to be the case. EKG w/o clear ischemic changes (TWI noted by admission team not meeting significant TWI criteria). Troponin peaked. TTE showed that EF has improved to 65%; diastolic dysfx; moderate AS; moderate MR; moderate pericardial effusion w/o wall motion abnormalities. The patient on presentation and throughout admission did not have paresthesias, numbness, twitching, or palpitations. Her chest pain has resolved. No sob, n, v, d, f, c. Cardio on board and feel that ischemic workup can stop. Recommendation of no ASA, plavix, ACE-I/ARB given intolerance / anemia / CKD. The patient is being seen by renal as well who made recommendations to workup plasma cell dyscrasia given nephrotic range proteinuria w/ SPEP, serum/urine immunofixation. Also recommend procrit, oral calcium supplementation and oral iron supplementation. Patient deemed optimized for step down to [facility].     SUBJECTIVE  No acute complaints.   Denies paresthesias, numbness, twitching, or palpitations. Her chest pain has resolved. No sob, n, v, d, f, c.     angiotensin converting enzyme inhibitors (Angioedema)  aspirin (Hives; Rash)  lactose (Diarrhea)  penicillin (Angioedema)  Seafood (Short breath)  shellfish (Short breath)  spinach (Unknown)    OBJECTIVE  PHYSICAL EXAM:  T(C): 36.9, Max: 37.2 (06-01 @ 01:56)  HR: 69 (66 - 84)  BP: 136/66 (120/63 - 159/70)  RR: 17 (14 - 18)  SpO2: 99% (97% - 100%)    I&O's Summary  I & Os for 24h ending 01 Jun 2017 07:00  =============================================  IN: 600 ml / OUT: 950 ml / NET: -350 ml    I & Os for current day (as of 01 Jun 2017 15:37)  =============================================  IN: 270 ml / OUT: 600 ml / NET: -330 ml      Appearance: NAD. Speaking in full sentences.   HEENT:   Conjunctiva clear b/l. Moist oral mucosa.  Cardiovascular: RRR with no m/r/g. No JVD. No LE edema b/l.   Respiratory: Lungs CTAB.  Gastrointestinal:  Soft, nontender. No rebound. No rigidity. 	  Extremities: No edema b/l. No erythema b/l. No calf tenderness b/l. Armando's sign negative b/l.  Vascular: DP 2+ b/l.  Skin: No rashes, No ecchymoses, No cyanosis. 	  Neurologic:  A&Ox3. Non-focal. Moving all extremities. Following commands.  	  LABS:                        7.6    8.5   )-----------( 198      ( 01 Jun 2017 05:43 )             22.2     06-01    142  |  102  |  82<H>  ----------------------------<  105<H>  3.4<L>   |  15<L>  |  4.10<H>    Ca    4.8<LL>      01 Jun 2017 05:49  Phos  6.3     06-01  Mg     2.0     06-01    TPro  x   /  Alb  2.9<L>  /  TBili  x   /  DBili  x   /  AST  x   /  ALT  x   /  AlkPhos  x   06-01      Urinalysis Basic - ( 01 Jun 2017 14:08 )    Color: x / Appearance: x / SG: x / pH: x  Gluc: x / Ketone: x  / Bili: x / Urobili: x   Blood: x / Protein: x / Nitrite: x   Leuk Esterase: x / RBC: < 5 /HPF / WBC Many /HPF   Sq Epi: x / Non Sq Epi: Rare /HPF / Bacteria: Many /HPF    RADIOLOGY & ADDITIONAL TESTS:  Reviewed by myself and with medical team.    MEDICATIONS  (STANDING):  carvedilol 6.25milliGRAM(s) Oral every 12 hours  hydrALAZINE 25milliGRAM(s) Oral three times a day  tamsulosin 0.4milliGRAM(s) Oral at bedtime  predniSONE   Tablet 5milliGRAM(s) Oral daily  heparin  Injectable 5000Unit(s) SubCutaneous every 12 hours  atorvastatin 40milliGRAM(s) Oral at bedtime  calcitriol   Capsule 0.5MICROGram(s) Oral daily  epoetin suhas Injectable 46879Yfjl(s) SubCutaneous every 7 days  calcium acetate 1334milliGRAM(s) Oral three times a day with meals  calcium carbonate 648 mG 1Tablet(s) Oral every 8 hours  ferrous    sulfate 325milliGRAM(s) Oral daily  ascorbic acid 500milliGRAM(s) Oral daily  polyethylene glycol 3350 17Gram(s) Oral daily      ASSESSMENT/PLAN:   85F PMH HTN, CKD 4, NSTEMI, Takotsubo cardiomyopathy (EF now 65% from 25%), SBO s/p resection 10/2016, anemia, hypocalcemia, temporal arteritis presented 5/31 in setting of decreased PO intake and malaise who was initially admitted to [facility] for hypocalcemia (4.9 corrected) and hypomagnesemia (0.6) likely multifactorial. Hypocalcemia from Suspect 2/2 secondary poor PO intake, CKD, hyperphosphatemia, and vit D def. Hypomagnesemia likely from poor po intake vs PPI.     RENAL / METABOLIC   #Hypocalcemia -- asymptomatic w/ nrml WTC. Suspect 2/2 secondary poor PO intake, CKD, hyperphosphatemia, and vit D def.   - S/P 1g calcium gluconate on admission.   - calcitriol   Capsule 0.5MICROGram(s) Oral daily  - calcium acetate 1334milliGRAM(s) Oral three times a day with meals  - calcium carbonate 1250 mg -- 1Tablet(s) Oral every 8 hours  - Repeat EKG in AM x 1.   - Goal corrected Ca >7     #Hypomagnesemia -- likely 2/2 decreased PO intake vs. PPI, resolved w/ repletion.  - dc PPI  - trend Mg daily    #CKD - presumably 2/2 HTN, appears to have nephrotic-range proteinuria.  - Dr. Bryan following, appreciate recs  - Renal US  - F/u plasma cell dyscrasia w/u.   - Trend all lytes daily, even phosphorus.     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     #Anion gap metabolic acidosis -- likely from CKD. 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   -Manage above conditions.  -BMP daily.  -Renal on board.     HEME:   #Microcytic Anemia -- Hgb 7 on admission from 9 on May 6th. Suspect 2/2 ACD 2/2 temporal arteritis (ESR elevated to 92) as well as CKD. Guaiac negative. The patient was transfused 1 unit of PRBC for anemia in the ED because of concern for demand ischemia given history of NSTEMI which is currently not thought to be the case. EKG w/o clear ischemic changes (TWI noted by admission team not meeting significant TWI criteria). Troponin peaked.  - Dr. Bryan following, appreciate recs  - epoetin suhas Injectable 94118Swib(s) SubCutaneous every 7 days  - ferrous    sulfate 325milliGRAM(s) Oral daily  - ascorbic acid 500milliGRAM(s) Oral daily  - polyethylene glycol 3350 17Gram(s) Oral daily  - trend Hgb daily, keep type and screen current, maintain two large bore IV lines.   - f/u FOBT  - F/u reticulocyte count.  - F/u plasma cell dyscrasia w/u     CV  #Chest pain (RESOLVED) -- unlikely ACS.   -Stat EKG if CP again.  -Cardio on board - no more ischemic w/u.     #Troponinemia -- possibly 2/2 demand ischemia. Chest pain on admission resolved. Trops downtrended. TWI in admission NONSPECIFIC.   -Cards on board -- no need for further ischemic workup. Other recs appreciated     #Moderate AS -- compensated.   -echo repeated per Dr. Jones recs to monitor AS  -repeat echo w/ stable moderate AS.     #Essential HTN  -carvedilol 6.25milliGRAM(s) Oral every 12 hours  -hydrALAZINE 25milliGRAM(s) Oral three times a day -- can increase to home dose of 50.     #Takotsubo cardiomyopathy (RESOLVED): EF recovered, 65% in 3/2017  -Repeat ECHO as above.     #Moderate pericardial effusion -- no signs of tamponade on exam. VSS.   -Repeat ECHO as above.       #BPH  -C/w flomax    RHEUM  #Temporal arteritis:  - c/w home prednisone 5mg PO QD  - will attempt to obtain collateral from pt's outpt rheumatologist    #F: none  #E: cautious repletion in setting of CKD 4  #N: DASH/renal diet, f/u Nutrition consult given poor appetite at home    #PPX: HSQ    #DISPO:  PT recs and pending above.     #CODE: DNR/DNI TRANSFER ACCEPTANCE NOTE FROM [facility] TO [facility] / BRIEF HOSPITAL COURSE    SUBJECTIVE  85F PMH HTN, CKD 4, NSTEMI, Takotsubo cardiomyopathy (EF now 65% from 25%), SBO s/p resection 10/2016, anemia, hypocalcemia, temporal arteritis presented 5/31 in setting of decreased PO intake and malaise who was initially admitted to [facility] for hypocalcemia (4.9 corrected) and hypomagnesemia (0.6) likely multifactorial 2/2 CKD and poor oral intake. Total, the patient received 1g CaGlu, 6g MgSO4, and 60 KCL PO. . QTc initially was 470. Repeat after improvement of calcium to higher level was 511 however this was thought to be an error. Repeat EKG this afternoon with Qtc at 472. In regards to the patients chest pain, the patient was transfused 1 unit of PRBC for anemia in the ED because of concern for demand ischemia given history of NSTEMI which is currently not thought to be the case. EKG w/o clear ischemic changes (TWI noted by admission team not meeting significant TWI criteria). Troponin peaked. TTE showed that EF has improved to 65%; diastolic dysfx; moderate AS; moderate MR; moderate pericardial effusion w/o wall motion abnormalities. The patient on presentation and throughout admission did not have paresthesias, numbness, twitching, or palpitations. Her chest pain has resolved. No sob, n, v, d, f, c. Cardio on board and feel that ischemic workup can stop. Recommendation of no ASA, plavix, ACE-I/ARB given intolerance / anemia / CKD. The patient is being seen by renal as well who made recommendations to workup plasma cell dyscrasia given nephrotic range proteinuria w/ SPEP, serum/urine immunofixation. Also recommend procrit, oral calcium supplementation and oral iron supplementation. Patient deemed optimized for step down to [facility].     SUBJECTIVE  No acute complaints.   Denies paresthesias, numbness, twitching, or palpitations. Her chest pain has resolved. No sob, n, v, d, f, c.     angiotensin converting enzyme inhibitors (Angioedema)  aspirin (Hives; Rash)  lactose (Diarrhea)  penicillin (Angioedema)  Seafood (Short breath)  shellfish (Short breath)  spinach (Unknown)    OBJECTIVE  PHYSICAL EXAM:  T(C): 36.9, Max: 37.2 (06-01 @ 01:56)  HR: 69 (66 - 84)  BP: 136/66 (120/63 - 159/70)  RR: 17 (14 - 18)  SpO2: 99% (97% - 100%)    I&O's Summary  I & Os for 24h ending 01 Jun 2017 07:00  =============================================  IN: 600 ml / OUT: 950 ml / NET: -350 ml    I & Os for current day (as of 01 Jun 2017 15:37)  =============================================  IN: 270 ml / OUT: 600 ml / NET: -330 ml      Appearance: NAD. Speaking in full sentences.   HEENT:   Conjunctiva clear b/l. Moist oral mucosa.  Cardiovascular: RRR with no m/r/g. No JVD. No LE edema b/l.   Respiratory: Lungs CTAB.  Gastrointestinal:  Soft, nontender. No rebound. No rigidity. 	  Extremities: No edema b/l. No erythema b/l. No calf tenderness b/l. Armando's sign negative b/l.  Vascular: DP 2+ b/l.  Skin: No rashes, No ecchymoses, No cyanosis. 	  Neurologic:  A&Ox3. Non-focal. Moving all extremities. Following commands.  	  LABS:                        7.6    8.5   )-----------( 198      ( 01 Jun 2017 05:43 )             22.2     06-01    142  |  102  |  82<H>  ----------------------------<  105<H>  3.4<L>   |  15<L>  |  4.10<H>    Ca    4.8<LL>      01 Jun 2017 05:49  Phos  6.3     06-01  Mg     2.0     06-01    TPro  x   /  Alb  2.9<L>  /  TBili  x   /  DBili  x   /  AST  x   /  ALT  x   /  AlkPhos  x   06-01      Urinalysis Basic - ( 01 Jun 2017 14:08 )    Color: x / Appearance: x / SG: x / pH: x  Gluc: x / Ketone: x  / Bili: x / Urobili: x   Blood: x / Protein: x / Nitrite: x   Leuk Esterase: x / RBC: < 5 /HPF / WBC Many /HPF   Sq Epi: x / Non Sq Epi: Rare /HPF / Bacteria: Many /HPF    RADIOLOGY & ADDITIONAL TESTS:  Reviewed by myself and with medical team.    MEDICATIONS  (STANDING):  carvedilol 6.25milliGRAM(s) Oral every 12 hours  hydrALAZINE 25milliGRAM(s) Oral three times a day  tamsulosin 0.4milliGRAM(s) Oral at bedtime  predniSONE   Tablet 5milliGRAM(s) Oral daily  heparin  Injectable 5000Unit(s) SubCutaneous every 12 hours  atorvastatin 40milliGRAM(s) Oral at bedtime  calcitriol   Capsule 0.5MICROGram(s) Oral daily  epoetin suhas Injectable 26510Uvzj(s) SubCutaneous every 7 days  calcium acetate 1334milliGRAM(s) Oral three times a day with meals  calcium carbonate 648 mG 1Tablet(s) Oral every 8 hours  ferrous    sulfate 325milliGRAM(s) Oral daily  ascorbic acid 500milliGRAM(s) Oral daily  polyethylene glycol 3350 17Gram(s) Oral daily      ASSESSMENT/PLAN:   85F PMH HTN, CKD 4, NSTEMI, Takotsubo cardiomyopathy (EF now 65% from 25%), SBO s/p resection 10/2016, anemia, hypocalcemia, temporal arteritis presented 5/31 in setting of decreased PO intake and malaise who was initially admitted to [facility] for hypocalcemia (4.9 corrected) and hypomagnesemia (0.6) likely multifactorial. Hypocalcemia from Suspect 2/2 secondary poor PO intake, CKD, hyperphosphatemia, and vit D def. Hypomagnesemia likely from poor po intake vs PPI.     RENAL / METABOLIC   #Hypocalcemia -- asymptomatic w/ nrml WTC. Suspect 2/2 secondary poor PO intake, CKD, hyperphosphatemia, and vit D def.   - S/P 1g calcium gluconate on admission.   - calcitriol   Capsule 0.5MICROGram(s) Oral daily  - calcium acetate 1334milliGRAM(s) Oral three times a day with meals  - calcium carbonate 1250 mg -- 1Tablet(s) Oral every 6 hours  - Repeat EKG in AM x 1.   - Goal corrected Ca >7     #Hypomagnesemia -- likely 2/2 decreased PO intake vs. PPI, resolved w/ repletion.  - dc PPI  - trend Mg daily    #CKD - presumably 2/2 HTN, appears to have nephrotic-range proteinuria.  - Dr. Bryan following, appreciate recs  - Renal US  - F/u plasma cell dyscrasia w/u.   - Trend all lytes daily, even phosphorus.     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     #Anion gap metabolic acidosis -- likely from CKD. 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   -Manage above conditions.  -BMP daily.  -Renal on board.     HEME:   #Microcytic Anemia -- Hgb 7 on admission from 9 on May 6th. Suspect 2/2 ACD 2/2 temporal arteritis (ESR elevated to 92) as well as CKD. Guaiac negative. The patient was transfused 1 unit of PRBC for anemia in the ED because of concern for demand ischemia given history of NSTEMI which is currently not thought to be the case. EKG w/o clear ischemic changes (TWI noted by admission team not meeting significant TWI criteria). Troponin peaked.  - Dr. Bryan following, appreciate recs  - epoetin suhas Injectable 31712Krkm(s) SubCutaneous every 7 days  - ferrous    sulfate 325milliGRAM(s) Oral daily  - ascorbic acid 500milliGRAM(s) Oral daily  - polyethylene glycol 3350 17Gram(s) Oral daily  - trend Hgb daily, keep type and screen current, maintain two large bore IV lines.   - f/u FOBT  - F/u reticulocyte count.  - F/u plasma cell dyscrasia w/u     CV  #Chest pain (RESOLVED) -- unlikely ACS.   -Stat EKG if CP again.  -Cardio on board - no more ischemic w/u.     #Troponinemia -- possibly 2/2 demand ischemia. Chest pain on admission resolved. Trops downtrended. TWI in admission NONSPECIFIC.   -Cards on board -- no need for further ischemic workup. Other recs appreciated     #Moderate AS -- compensated.   -echo repeated per Dr. Jones recs to monitor AS  -repeat echo w/ stable moderate AS.     #Essential HTN  -carvedilol 6.25milliGRAM(s) Oral every 12 hours  -hydrALAZINE 25milliGRAM(s) Oral three times a day -- can increase to home dose of 50.     #Takotsubo cardiomyopathy (RESOLVED): EF recovered, 65% in 3/2017  -Repeat ECHO as above.     #Moderate pericardial effusion -- no signs of tamponade on exam. VSS.   -Repeat ECHO as above.       #BPH  -C/w flomax    RHEUM  #Temporal arteritis:  - c/w home prednisone 5mg PO QD  - will attempt to obtain collateral from pt's outpt rheumatologist    #F: none  #E: cautious repletion in setting of CKD 4  #N: DASH/renal diet, f/u Nutrition consult given poor appetite at home    #PPX: HSQ    #DISPO:  PT recs and pending above.     #CODE: DNR/DNI

## 2017-06-01 NOTE — PHYSICAL THERAPY INITIAL EVALUATION ADULT - CRITERIA FOR SKILLED THERAPEUTIC INTERVENTIONS
impairments found/therapy frequency/functional limitations in following categories/anticipated discharge recommendation/risk reduction/prevention/rehab potential

## 2017-06-01 NOTE — PHYSICAL THERAPY INITIAL EVALUATION ADULT - TRANSFER SAFETY CONCERNS NOTED: SIT/STAND, REHAB EVAL
losing balance/decreased sequencing ability/decreased safety awareness/decreased step length/decreased balance during turns

## 2017-06-01 NOTE — CONSULT NOTE ADULT - ASSESSMENT
N: 85F PMH HTN, CKD 4, Takotsubo cardiomyopathy (EF now 65%), SBO s/p resection 10/2016, anemia, hypocalcemia, NSTEMI, temporal arteritis presented 5/31 in setting of decreased PO intake and malaise, admitted to [facility] for hypocalcemia, hypomagnesemia, and anemia likely 2/2 CKD.    RENAL  #Hypocalcemia: Asymptomatic, QTC wnl on EKG. suspect 2/2 low 1,25 dihydroxy vitamin D 2/2 CKD. S/P 1g calcium gluconate.   - f/u PTH, 25 OH D, 1,25 dihydroxy D  - Dr. Bryan consulted, will f/u recs including UPEP as appears to have nephrotic range proteinuria, renal U/S    #Hypomag; likely 2/2 decreased PO intake vs. PPI, resolved w/ repletion.  - dc PPI  - trend daily    #AG metabolic acidosis- possibly 2/2 uremia and starvation ketoacidosis  - f/u BHB, UA for ketones  - encourage PO intake    HEME: Hgb 7 on admission from 9 on May 6th. Suspect 2/2 ACD 2/2 temporal arteritis as well as CKD. Guaiac negative. Given 1u PRBC.  - trend Hgb  - f/u FOBT

## 2017-06-01 NOTE — PROGRESS NOTE ADULT - SUBJECTIVE AND OBJECTIVE BOX
CC: WEAKNESS      INTERVAL HISTORY:    ·	HTN controlled.  ·	Stable ANTONIA.  ·	Hypercalcemia not improved.  ·	Stable anemia.     ROS: No chest pain. No shortness of breath. No nausea.    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: S/P partial hysterectomy      PHYSICAL EXAM:  T(C): 37.2, Max: 37.2 ( @ 01:56)  HR: 73  BP: 146/64 (120/63 - 159/70)  RR: 14  SpO2: 100%  Wt(kg): --    I & Os for 24h ending 2017 07:00  =============================================  IN:    IV PiggyBack: 300 ml    Oral Fluid: 300 ml    Total IN: 600 ml  ---------------------------------------------  OUT:    Voided: 950 ml    Total OUT: 950 ml  ---------------------------------------------  Total NET: -350 ml    I & Os for current day (as of 2017 13:35)  =============================================  IN:    Oral Fluid: 270 ml    Total IN: 270 ml  ---------------------------------------------  OUT:    Voided: 600 ml    Total OUT: 600 ml  ---------------------------------------------  Total NET: -330 ml    Weight 45.5 ( @ 07:43)    Appearance: alert, pleasant, INAD.  ENT: oral mucosa moist, no pallor/cyanosis.  Neck: no JVD visible.  Cardiac: no rubs. LACHELLE. Extremities: PITTING BILATERAL ANKLE EDEMA  Skin: no rashes.  Extremities (digits): no clubbing or cyanosis.  Respratory effort: no access muscle use. Lungs: CLEAR TO AUSCULTATION.  Abdomen: soft. nontender. no masses.  Psych affect: not depressed.     MEDICATIONS  (STANDING):  carvedilol 6.25milliGRAM(s) Oral every 12 hours  hydrALAZINE 25milliGRAM(s) Oral three times a day  tamsulosin 0.4milliGRAM(s) Oral at bedtime  predniSONE   Tablet 5milliGRAM(s) Oral daily  heparin  Injectable 5000Unit(s) SubCutaneous every 12 hours  atorvastatin 40milliGRAM(s) Oral at bedtime  calcitriol   Capsule 0.5MICROGram(s) Oral daily  epoetin suhas Injectable 97639Qnep(s) SubCutaneous every 7 days  calcium acetate 1334milliGRAM(s) Oral three times a day with meals    MEDICATIONS  (PRN):      DATA:  142    |  102    |  82<H>  ----------------------------<  105<H>  Ca:4.8<LL> (2017 05:49)  3.4<L>   |  15<L>  |  4.10<H>      eGFR if Non : 9 <L>  eGFR if : 11 <L>    TPro  x      /  Alb  2.9 g/dL<L>  /  TBili  x      /  DBili  x      /  AST  x      /  ALT  x      /  AlkPhos  x      2017 05:49                        7.6<L>  8.5   )-----------( 198      ( 2017 05:43 )             22.2<L>    Phos:6.3 mg/dL<H> M.0 mg/dL PTH:-- Uric acid:-- Serum Osm:--  Ferritin:-- Iron:-- TIBC:-- Tsat:--  B12:-- TSH:-- ( @ 05:49)

## 2017-06-01 NOTE — PROGRESS NOTE ADULT - SUBJECTIVE AND OBJECTIVE BOX
Chief Complaint/Reason for Consult: CHF  INTERVAL HPI: no acute evens overnight nad  	  MEDICATIONS:  carvedilol 6.25milliGRAM(s) Oral every 12 hours  hydrALAZINE 25milliGRAM(s) Oral three times a day  tamsulosin 0.4milliGRAM(s) Oral at bedtime          polyethylene glycol 3350 17Gram(s) Oral daily    predniSONE   Tablet 5milliGRAM(s) Oral daily  atorvastatin 40milliGRAM(s) Oral at bedtime    heparin  Injectable 5000Unit(s) SubCutaneous every 12 hours  calcitriol   Capsule 0.5MICROGram(s) Oral daily  epoetin suhas Injectable 18456Vbmu(s) SubCutaneous every 7 days  calcium acetate 1334milliGRAM(s) Oral three times a day with meals  ferrous    sulfate 325milliGRAM(s) Oral daily  ascorbic acid 500milliGRAM(s) Oral daily  calcium carbonate 1250 mG (OsCal) 1Tablet(s) Oral every 6 hours      REVIEW OF SYSTEMS:  [x] As per HPI  CONSTITUTIONAL: No fever, weight loss, or fatigue  RESPIRATORY: No cough, wheezing, chills or hemoptysis; No Shortness of Breath  CARDIOVASCULAR: No chest pain, palpitations, dizziness, or leg swelling  GASTROINTESTINAL: No abdominal or epigastric pain. No nausea, vomiting, or hematemesis; No diarrhea or constipation. No melena or hematochezia.  MUSCULOSKELETAL: No joint pain or swelling; No muscle, back, or extremity pain  [x] All others negative	  [ ] Unable to obtain    PHYSICAL EXAM:  T(C): 36.9, Max: 37.2 (06-01 @ 01:56)  HR: 69 (66 - 84)  BP: 136/66 (120/63 - 159/70)  RR: 17 (14 - 18)  SpO2: 99% (97% - 100%)  Wt(kg): --  I&O's Summary  I & Os for 24h ending 01 Jun 2017 07:00  =============================================  IN: 600 ml / OUT: 950 ml / NET: -350 ml    I & Os for current day (as of 01 Jun 2017 18:27)  =============================================  IN: 270 ml / OUT: 600 ml / NET: -330 ml        Appearance: Normal	  HEENT:   Normal oral mucosa  Cardiovascular: Normal S1 S2, No JVD, No murmurs, No edema  Respiratory: Lungs clear to auscultation	  Gastrointestinal:  Soft, Non-tender, + BS	  Extremities: Normal range of motion, No clubbing, cyanosis or edema  Vascular: Peripheral pulses palpable 2+ bilaterally    TELEMETRY: 	    ECG:    	  RADIOLOGY:   CXR:  CT:  US:    CARDIAC TESTING:  Echocardiogram:Normal left ventricular size and wall thickness.The left ventricular wall   motion is normal.The left ventricular ejection fraction is estimated to   be   55-60%The left atrial size is normal.The mitral inflow pattern is   consistent   with impaired left ventricular relaxation with mildly elevated left   atrial   pressure (8-14mmHg).  Right atrial size is normal.The right ventricle is   normal in size and function.Calcified aortic valve.There is mild aortic   regurgitation.There is Moderate aortic stenosis.The mean pressure   gradient is   23 mmHg.The calculated aortic valve area using the continuity equation   is 1   cm2.Structurally normal mitral valve.There is moderate mitral   regurgitation.Structurally normal tricuspid valve.There is mild tricuspid   regurgitation.The pulmonary artery systolic pressure is estimated to be   23   mmHg.Structurally normal pulmonic valve.There is mild pulmonic   regurgitation.No aortic root dilatation.The inferior vena cava was not   well   visualized.Moderate sized pericardial effusion that is circumferential.   No   chamber collapse noted  Catheterization:  Stress Test:      LABS:	 	    CARDIAC MARKERS:                                  7.6    8.5   )-----------( 198      ( 01 Jun 2017 05:43 )             22.2     06-01    142  |  102  |  82<H>  ----------------------------<  105<H>  3.4<L>   |  15<L>  |  4.10<H>    Ca    4.8<LL>      01 Jun 2017 05:49  Phos  6.3     06-01  Mg     2.0     06-01    TPro  x   /  Alb  2.9<L>  /  TBili  x   /  DBili  x   /  AST  x   /  ALT  x   /  AlkPhos  x   06-01    proBNP:   Lipid Profile:   HgA1c:   TSH:     ASSESSMENT/PLAN: 	    Problem/Plan - 1:    ·  Problem: CAD (coronary artery disease).   Troponin peaked 2/2 demand.  No ASA due to ASA allergy. No Plavix due to h/o Anemia. Cont. Coreg, Lipitor  No further ischemia work-up at this time     Echo 1/24/16: mild concentric LVH. apical mild HK 55%, impaired left ventricular relaxation with mildly elevated left atrial pressure (8-14mmHg), Moderate AS (FERDINAND 1.2 cm2), The peak pressure gradient is 27mmHg, mean pressure gradient is 16 mmHg, mild to moderate AR, moderate pericardial effusion noted. EF improved from prior.     Please repeat echo to eval effusion and AI - unchanged AI and effusion  no clinical signs of tamponade, will defer pericardiocentesis for now.    Problem/Plan - 2:  ·  Problem: Chronic systolic congestive heart failure.  Plan: repeat Echo  No ACEI/ARB due to ACEI allergy and CKD.   Hold Lasix due to acute hypokalemia, acute hypocalcemia and acute hypomagnesemia, in Cr. from baseline on admission. Lungs CTA B/L. Euvolemic.   f/u need for lasix per Renal recs    Problem/Plan - 3:  ·  Problem: Essential hypertension.  Plan: Cont. Coreg, Hydralazine. Lasix on hold.

## 2017-06-01 NOTE — PROGRESS NOTE ADULT - SUBJECTIVE AND OBJECTIVE BOX
ASSESSMENT/PLAN: 85F PMH HTN, CKD 4, Takotsubo cardiomyopathy (EF now 65%), SBO s/p resection 10/2016, anemia, hypocalcemia, NSTEMI, temporal arteritis presented 5/31 in setting of decreased PO intake and malaise, admitted to [facility] for hypocalcemia, hypomagnesemia, and anemia likely 2/2 CKD.    RENAL  #Hypocalcemia: Asymptomatic, QTC wnl on EKG. suspect 2/2 low 1,25 dihydroxy vitamin D 2/2 CKD. S/P 1g calcium gluconate.   - f/u PTH, 25 OH D, 1,25 dihydroxy D  - Dr. Bryan consulted, will f/u recs including UPEP as appears to have nephrotic range proteinuria, renal U/S    #Hypomag; likely 2/2 decreased PO intake vs. PPI, resolved w/ repletion.  - dc PPI  - trend daily    #AG metabolic acidosis- possibly 2/2 uremia and starvation ketoacidosis  - f/u BHB, UA for ketones  - encourage PO intake    HEME: Hgb 7 on admission from 9 on May 6th. Suspect 2/2 ACD 2/2 temporal arteritis as well as CKD. Guaiac negative. Given 1u PRBC.  - trend Hgb  - f/u FOBT    CV  #Troponinemia: possibly 2/2 demand ischemia. Chest pain on admission resolved. Trops downtrended. TWI in V4-V6 on admission resolved. Per Cards, no ASA 2/2 ASA allergy, no Plavix 2/2 h/o anemia, c/w Coreg and Lipitor, no further ischemia work-up at this time   #AS: repeat echo per Cardiology recs  #Essential HTN: c/w home coreg, hydralazine. Holding home Lasix in setting of electrolyte abnormalities.  #Takotsubo cardiomyopathy: EF recovered, 65% in 3/2017    RHEUM  #Temporal arteritis:  - c/w home prednisone 5mg PO QD  - will attempt to obtain collateral from pt's outpt rheumatologist    #F: none  #E: cautious repletion in setting of CKD 4  #N: DASH/renal diet    #PPX: HSQ    #CODE: DNR/DNI  #DISPO: transfer to [facility] ASSESSMENT/PLAN: 85F PMH HTN, CKD 4, Takotsubo cardiomyopathy (EF now 65%), SBO s/p resection 10/2016, anemia, hypocalcemia, NSTEMI, temporal arteritis presented 5/31 in setting of decreased PO intake and malaise, admitted to [facility] for hypocalcemia, hypomagnesemia, and anemia likely 2/2 CKD.    RENAL  #Hypocalcemia: Asymptomatic, QTC wnl on EKG. suspect 2/2 low 1,25 dihydroxy vitamin D 2/2 CKD. S/P 1g calcium gluconate.   - f/u PTH, 25 OH D, 1,25 dihydroxy D  - Dr. Bryan consulted, will f/u recs including UPEP as appears to have nephrotic range proteinuria, renal U/S    #Hypomag; likely 2/2 decreased PO intake vs. PPI, resolved w/ repletion.  - dc PPI  - trend daily    #AG metabolic acidosis- possibly 2/2 uremia and starvation ketoacidosis  - f/u BHB, UA for ketones  - encourage PO intake    HEME: Hgb 7 on admission from 9 on May 6th. Suspect 2/2 ACD 2/2 temporal arteritis as well as CKD. Guaiac negative. Given 1u PRBC.  - trend Hgb  - f/u FOBT    CV  #Troponinemia: possibly 2/2 demand ischemia. Chest pain on admission resolved. Trops downtrended. TWI in V4-V6 on admission resolved. Per Cards, no ASA 2/2 ASA allergy, no Plavix 2/2 h/o anemia, c/w Coreg and Lipitor, no further ischemia work-up at this time   #AS: repeat echo per Cardiology recs  #Essential HTN: c/w home coreg, hydralazine. Holding home Lasix in setting of electrolyte abnormalities.  #Takotsubo cardiomyopathy: EF recovered, 65% in 3/2017  #pericardial effusion is stable    RHEUM  #Temporal arteritis:  - c/w home prednisone 5mg PO QD  - will attempt to obtain collateral from pt's outpt rheumatologist    #F: none  #E: cautious repletion in setting of CKD 4  #N: DASH/renal diet    #PPX: HSQ    #CODE: DNR/DNI  #DISPO: transfer to [facility] PGY1 TRANSFER NOTE    Hospital Course: 85F PMH HTN, CKD 4, Takotsubo cardiomyopathy (EF now 65%), SBO s/p resection 10/2016, anemia, hypocalcemia, NSTEMI, temporal arteritis presented 5/31 in setting of decreased PO intake and malaise, admitted to [facility] for hypocalcemia, hypomagnesemia, and anemia. Pt asymptomatic from hypocalcemia, with nrml QTc, w/ etiology suspected to be from secondary hyperparathyroidism 2/2 CKD given also elevated PTH and phos. Dr. Bryan consulted, recommended starting calcitriol and phosphate binder. Hypomagnesemia resolved w/ repletion and holding of home PPI. Anemia also likely 2/2 CKD given no evidence of bleeding on exam, given 1u pRBC w/ inappropriate response, and being started on Procrit per Dr. Bryan. Echo repeated per Dr. Jones showing table AS and pericardial effusion. Of note, pt also with chest pain on admission w/ mildly elevated trops that downtrended and TWI in V4-V6 that resolved, likely 2/2 demand ischemia. Stable for transfer to [facility] for further management.     Overnight Events: HENRY    Subjective: Patient seen and examined at bedside. Feels well with good appetite. Denies chest pain, SOB, constipation, myalgias, paresthesias.    [OBJECTIVE]:    Vital Signs:  T(F): , Max: 99 (06-01 @ 05:00)  HR:  (66 - 84)  BP:  (120/63 - 159/70)  BP(mean):  (86 - 101)  RR:  (14 - 18)  SpO2:  (97% - 100%) RA    I & Os for 24h ending 06-01 @ 07:00  =============================================  IN: 600 ml / OUT: 950 ml / NET: -350 ml    I & Os for current day (as of 06-01 @ 15:42)  =============================================  IN: 270 ml / OUT: 600 ml / NET: -330 ml    Physical Exam:  T(F): 97.8  HR: 67  BP: 150/73  RR: 16  SpO2: 99% RA    General: AOx3, NAD  HEENT: MMM  Respiratory: CTA b/l, no wheezes, rales or rhonchi  Cardiovascular: Regular, +S1 + S2, holosystolic murmur heard best at LLSB radiating to clavicles  Abdomen: Soft, NTND, normoactive bowel sounds, no rebound, no guarding  Extremities: WWP, no edema  Neurological: CN II-XII grossly intact, follows commands, moves all extremities  Psych: normal affect  MSK: no joint swelling    Medications:  MEDICATIONS  (STANDING):  carvedilol 6.25milliGRAM(s) Oral every 12 hours  hydrALAZINE 25milliGRAM(s) Oral three times a day  tamsulosin 0.4milliGRAM(s) Oral at bedtime  predniSONE   Tablet 5milliGRAM(s) Oral daily  heparin  Injectable 5000Unit(s) SubCutaneous every 12 hours  atorvastatin 40milliGRAM(s) Oral at bedtime  calcitriol   Capsule 0.5MICROGram(s) Oral daily  epoetin suhas Injectable 76311Kirh(s) SubCutaneous every 7 days  calcium acetate 1334milliGRAM(s) Oral three times a day with meals  calcium carbonate 648 mG 1Tablet(s) Oral every 8 hours  ferrous    sulfate 325milliGRAM(s) Oral daily  ascorbic acid 500milliGRAM(s) Oral daily  polyethylene glycol 3350 17Gram(s) Oral daily    MEDICATIONS  (PRN):      Allergies:  Allergies    angiotensin converting enzyme inhibitors (Angioedema)  aspirin (Hives; Rash)  lactose (Diarrhea)  penicillin (Angioedema)  Seafood (Short breath)  shellfish (Short breath)  spinach (Unknown)    Intolerances        Labs:                        7.6    8.5   )-----------( 198      ( 01 Jun 2017 05:43 )             22.2     06-01    142  |  102  |  82<H>  ----------------------------<  105<H>  3.4<L>   |  15<L>  |  4.10<H>    Ca    4.8<LL>      01 Jun 2017 05:49  Phos  6.3     06-01  Mg     2.0     06-01    TPro  x   /  Alb  2.9<L>  /  TBili  x   /  DBili  x   /  AST  x   /  ALT  x   /  AlkPhos  x   06-01    ASSESSMENT/PLAN: 85F PMH HTN, CKD 4, Takotsubo cardiomyopathy (EF now 65%), SBO s/p resection 10/2016, anemia, hypocalcemia, NSTEMI, temporal arteritis presented 5/31 in setting of decreased PO intake and malaise, admitted to [facility] for hypocalcemia, hypomagnesemia, and anemia likely 2/2 CKD. Stable for transfer to [facility] for further management.    RENAL  #Hypocalcemia: asymptomatic w/ nrml WTC. Suspect 2/2 secondary hyperparathyroidism from CKD as both phos and PTH elevated. Expected 1,25 hydroxy vitamin D to be low 2/2 CKD, but WNL - possibly falsely elevated in setting of chronic inflammation from TA. S/P 1g calcium gluconate on admission.   - Dr. Bryan following, appreciate recs: c/w calcitriol, start Phoslo  - trend Ca daily    #Hypomagnesemia; likely 2/2 decreased PO intake vs. PPI, resolved w/ repletion.  - dc PPI  - trend Mg daily    #CKD - presumably 2/2 HTN, appears to have nephrotic-range proteinuria, AG metabolic acidosis 2/2 uremia.  - Dr. Bryan following, appreciate recs: f/u SPEP, serum immunofixation, UPEP, urine immunofixation, renal U/S    HEME: Hgb 7 on admission from 9 on May 6th. Suspect 2/2 ACD 2/2 temporal arteritis (ESR elevated to 92) as well as CKD. Guaiac negative. Given 1u PRBC w/ inappropriate response.  - Dr. Bryan following, appreciate recs: start Procrit  - trend Hgb daily  - f/u FOBT    CV  #Troponinemia: possibly 2/2 demand ischemia. Chest pain on admission resolved. Trops downtrended. TWI in V4-V6 on admission resolved. Per Cards, no ASA 2/2 ASA allergy, no Plavix 2/2 h/o anemia, c/w Coreg and Lipitor, no further ischemia work-up at this time  #AS: echo repeated per Dr. Jones recs to monitor AS- repeat echo w/ stable moderate AS  #Essential HTN: c/w home coreg, hydralazine- can increase from 25mg TID to home dose 50mg TID as needed. Holding home Lasix in setting of electrolyte abnormalities. also c/w home Flomax.  #Takotsubo cardiomyopathy: EF recovered, 65% in 3/2017  #pericardial effusion is stable    RHEUM  #Temporal arteritis:  - c/w home prednisone 5mg PO QD  - will attempt to obtain collateral from pt's outpt rheumatologist    #F: none  #E: cautious repletion in setting of CKD 4  #N: DASH/renal diet, f/u Nutrition consult given poor appetite at home    #PPX: HSQ    #CODE: DNR/DNI  #DISPO: transfer to [facility], PT recs RACHEL

## 2017-06-01 NOTE — PHYSICAL THERAPY INITIAL EVALUATION ADULT - IMPAIRMENTS FOUND, PT EVAL
poor safety awareness/aerobic capacity/endurance/gait, locomotion, and balance/muscle strength/fine motor

## 2017-06-01 NOTE — PROGRESS NOTE ADULT - ASSESSMENT
ANTONIA. Hypocalcemia. Proteinuria. Anemia.    Suggest:    1. Follow SCr. Gentle IVF.  2. Check serum and urine immunofixation given proteinuria.  3. Continue oral calcium supplementation and calcitriol.  4. Oral iron. Will consider IV iron. Workup for GIB given iron deficiency.    Please call with any questions.    Hector Bryan MD, FACP, FASN | kidney.[facility]  Nephrology, Hypertension, and Internal Medicine  Mobile: [phone] (Daytime Hours Only)  Office/Answering Service: [phone]  Asst. Prof. of Medicine, [facility] - Nephrology at [address]  [address], [address], [place], [place]

## 2017-06-01 NOTE — PHYSICAL THERAPY INITIAL EVALUATION ADULT - ADDITIONAL COMMENTS
Pt lives alone in elevator access apt building, daughter lives in apt unit across the bec[place]. States that she amb w/ RW at baseline and denies any hx of recent falls. 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 Daughter helps her w/ cooking and grocery shopping Pt lives alone in elevator access apt building no stairs to enter, daughter lives in apt unit across the [place]. States that she amb w/ RW at baseline and denies any hx of recent falls. 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 Daughter helps her w/ cooking and grocery shopping

## 2017-06-01 NOTE — PHYSICAL THERAPY INITIAL EVALUATION ADULT - MODALITIES TREATMENT COMMENTS
Max VCs for visual tracking, unable to see in L visual field without turning head to the L. Pt states that she is blind in the L eye and had impaired vision in the R eye

## 2017-06-01 NOTE — CONSULT NOTE ADULT - SUBJECTIVE AND OBJECTIVE BOX
Patient is a 85y old  Female who presents with a chief complaint of Poor appetite (31 May 2017 06:04)      HPI:  85F PMH htn, CKD 4, takotsubo's ef improved, sbo surgically repaired, anemia, hypocalcemia, nstemi managed medically one year ago presenting with chest pain. Intermittent, midsternal. Brought in by daughter as for reduced PO intake. Over the last several days patient says she has lost her appetite and is not taking her medications. She states her strength is good. No paresthesias. No episodes of confusion. ROS otherwise negative for fever/chills, dizzyness, blurry vision,sob, CP, nausea, vomiting, dysuria, focal weakness, rash. Has chronic soft stools following bowel resection from SBO.     ED VS: 98.6 174/84 85 17 99 on RA. New TWI in V4-6. Chest pain resolved. CXR clear. Hgb 7.3. Mg .6, iron 11, calcium 4.5. Crt 4.1 at baseline. Trops .23. Given 1g calcium, then 4g magnesium in ED. NS 1L bolus, plavix 75.   ICU consult called for hypocalcemia, admitted to . (31 May 2017 06:04)      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: S/P partial hysterectomy      MEDICATIONS  (STANDING):  carvedilol 6.25milliGRAM(s) Oral every 12 hours  hydrALAZINE 25milliGRAM(s) Oral three times a day  tamsulosin 0.4milliGRAM(s) Oral at bedtime  predniSONE   Tablet 5milliGRAM(s) Oral daily  heparin  Injectable 5000Unit(s) SubCutaneous every 12 hours  atorvastatin 40milliGRAM(s) Oral at bedtime  calcitriol   Capsule 0.5MICROGram(s) Oral daily  epoetin suhas Injectable 29193Hjab(s) SubCutaneous every 7 days  calcium acetate 1334milliGRAM(s) Oral three times a day with meals    MEDICATIONS  (PRN):      Social History: lives alone in an elevator accessible apartment building, daughter lives across the becerra, no home care services    Functional Level Prior to Admission: reports ADL independent, walks with a rolling walker, daughter assists with cleaning, food shopping, food prep    FAMILY HISTORY:  No pertinent family history in first degree relatives      CBC Full  -  ( 01 Jun 2017 05:43 )  WBC Count : 8.5 K/uL  Hemoglobin : 7.6 g/dL  Hematocrit : 22.2 %  Platelet Count - Automated : 198 K/uL  Mean Cell Volume : 73.5 fL  Mean Cell Hemoglobin : 25.2 pg  Mean Cell Hemoglobin Concentration : 34.2 g/dL  Auto Neutrophil # : x  Auto Lymphocyte # : x  Auto Monocyte # : x  Auto Eosinophil # : x  Auto Basophil # : x  Auto Neutrophil % : x  Auto Lymphocyte % : x  Auto Monocyte % : x  Auto Eosinophil % : x  Auto Basophil % : x      06-01    142  |  102  |  82<H>  ----------------------------<  105<H>  3.4<L>   |  15<L>  |  4.10<H>    Ca    4.8<LL>      01 Jun 2017 05:49  Phos  6.3     06-01  Mg     2.0     06-01    TPro  x   /  Alb  2.9<L>  /  TBili  x   /  DBili  x   /  AST  x   /  ALT  x   /  AlkPhos  x   06-01      Urinalysis Basic - ( 01 Jun 2017 14:08 )    Color: x / Appearance: x / SG: x / pH: x  Gluc: x / Ketone: x  / Bili: x / Urobili: x   Blood: x / Protein: x / Nitrite: x   Leuk Esterase: x / RBC: < 5 /HPF / WBC Many /HPF   Sq Epi: x / Non Sq Epi: Rare /HPF / Bacteria: Many /HPF          Radiology:    EXAM:  XR CHEST 1 VIEW PORT IMMEDIATE                          PROCEDURE DATE:  05/31/2017                     INTERPRETATION:  XR CHEST 1 VIEW PORT IMMEDIATE dated 5/31/2017 2:20 AM    CLINICAL INFORMATION: Female, 85 years old.  chest pain.    PRIOR STUDIES: 5/6/2017.    FINDINGS: Heart size, mediastinal and hilar contours are unchanged from   prior study. No recent pleural and/or parenchymal pathology.    IMPRESSION:  No significant interval change and no acute pulmonary pathology.              Vital Signs Last 24 Hrs  T(C): 36.6, Max: 37.2 (06-01 @ 01:56)  T(F): 97.8, Max: 99 (06-01 @ 05:00)  HR: 67 (66 - 84)  BP: 150/73 (120/63 - 159/70)  BP(mean): 101 (86 - 101)  RR: 16 (14 - 18)  SpO2: 99% (97% - 100%)    REVIEW OF SYSTEMS:    CONSTITUTIONAL: fatigue  EYES: No eye pain, visual disturbances, or discharge  ENMT:  No difficulty hearing, tinnitus, vertigo; No sinus or throat pain  NECK: No pain or stiffness  BREASTS: No pain, masses, or nipple discharge  RESPIRATORY: No cough, wheezing, chills or hemoptysis; No shortness of breath  CARDIOVASCULAR: No chest pain, palpitations, dizziness, or leg swelling  GASTROINTESTINAL: No abdominal or epigastric pain. No nausea, vomiting, or hematemesis; No diarrhea or constipation. No melena or hematochezia.  GENITOURINARY: No dysuria, frequency, hematuria, or incontinence  NEUROLOGICAL: No headaches, memory loss, loss of strength, numbness, or tremors  SKIN: No itching, burning, rashes, or lesions   LYMPH NODES: No enlarged glands  ENDOCRINE: No heat or cold intolerance; No hair loss  MUSCULOSKELETAL: No joint pain or swelling; No muscle, back, or extremity pain  PSYCHIATRIC: No depression, anxiety, mood swings, or difficulty sleeping  HEME/LYMPH: No easy bruising, or bleeding gums  ALLERGY AND IMMUNOLOGIC: No hives or eczema      Physical Exam: AA woman lying in bed, c/o generalized weakness    HEENT: normocephalic/ atraumatic, anicteric    Neck: supple, negative JVD, negative carotid bruits,    Chest: CTA bilaterally, neg wheeze, rhonchi, rales, crackles, egophany    Cardiovascular: regular rate and rhythm, neg murmurs/rubs/gallops    Abdomen: soft, non tender, negative rebound/guarding, non distended, normal bowel sounds, neg hepatosplenomegaly    Extremities: WWP, neg cyanosis/clubbing/edema, negative calf tenderness to palpation, negative Armando's sign    Neurologic Exam:    Alert and oriented to person, place, date/year, speech fluent w/o dysarthria, repetition intact, comprehension intact,     Cranial Nerves:     II:                       pupils equal, round and reactive to light, blind OS   III/ IV/VI:             extraocular movements intact, neg nystagmus, ptosis  V:                      facial sensation intact, V1-3 normal  VII:                     face symmetric, no droop, normal eye closure and smile  VIII:                    hearing intact to finger rub bilaterally  IX/ X:                  soft palate rise symmetrical  XI:                      head turning, shoulder shrug normal  XII:                     tongue midline    Motor Exam:    Bilateral UE:        4+/5 /intrinsics                            5/5 biceps/triceps/wrist extensors-flexors/deltoid                            negative pronator drift      Bilateral LE:        4/5 hip flexors/adductors/abductors                            4/5 quadriceps/hamstrings                            5/5 dorsiflexors/plantar flexors/invertors-evertors    Sensory: intact to LT/PP in all UE/LE dermatomes    DTR:        = biceps/     triceps/     brachioradialis                 = patella/   medial hamstring/    ankle                 neg clonus                 neg Babinski                 neg Hoffmans    Finger to Nose: wnl    Heel to Shin:      wnl    Rapid Alternating movements:  wnl    Joint Position Sense:  intact    Romberg: NT    Tandem Walking: NT    Gait:  NT        PM&R Impression:    1) deconditioned  2) gait dysfunction  3) hypocalcemia.hypomagnesemia  4) anemia/ chronic kidney disease      Recommendations:    1) Physical therapy focusing on therapeutic exercises, bed mobility/transfer out of bed evaluation, progressive ambulation with assistive devices.    2) Disposition Plan: anticipate subacute rehab placement

## 2017-06-01 NOTE — PHYSICAL THERAPY INITIAL EVALUATION ADULT - PATIENT/FAMILY AGREES WITH PLAN
no/Pt wants to return home, states that she was at [facility] last year x3 months and hated it

## 2017-06-01 NOTE — PHYSICAL THERAPY INITIAL EVALUATION ADULT - PERTINENT HX OF CURRENT PROBLEM, REHAB EVAL
85F PMH htn, CKD 4, takotsubo's ef improved, sbo surgically repaired, anemia, hypocalcemia, nstemi managed medically one year ago presenting with chest pain. Intermittent, midsternal. Brought in by daughter as for reduced PO intake.

## 2017-06-02 VITALS
SYSTOLIC BLOOD PRESSURE: 133 MMHG | HEART RATE: 66 BPM | RESPIRATION RATE: 18 BRPM | DIASTOLIC BLOOD PRESSURE: 54 MMHG | TEMPERATURE: 98 F

## 2017-06-02 DIAGNOSIS — J45.909 UNSPECIFIED ASTHMA, UNCOMPLICATED: ICD-10-CM

## 2017-06-02 DIAGNOSIS — N18.9 CHRONIC KIDNEY DISEASE, UNSPECIFIED: ICD-10-CM

## 2017-06-02 LAB
ANION GAP SERPL CALC-SCNC: 20 MMOL/L — HIGH (ref 5–17)
BLD GP AB SCN SERPL QL: NEGATIVE — SIGNIFICANT CHANGE UP
BUN SERPL-MCNC: 77 MG/DL — HIGH (ref 7–23)
CALCIUM SERPL-MCNC: 6.4 MG/DL — CRITICAL LOW (ref 8.4–10.5)
CHLORIDE SERPL-SCNC: 105 MMOL/L — SIGNIFICANT CHANGE UP (ref 96–108)
CO2 SERPL-SCNC: 17 MMOL/L — LOW (ref 22–31)
CREAT SERPL-MCNC: 3.7 MG/DL — HIGH (ref 0.5–1.3)
EPO SERPL-MCNC: 10.5 MIU/ML — SIGNIFICANT CHANGE UP (ref 2.6–18.5)
GLUCOSE SERPL-MCNC: 100 MG/DL — HIGH (ref 70–99)
HCT VFR BLD CALC: 21.8 % — LOW (ref 34.5–45)
HGB BLD-MCNC: 7.2 G/DL — LOW (ref 11.5–15.5)
INTERPRETATION 24H UR IFE-IMP: SIGNIFICANT CHANGE UP
MAGNESIUM SERPL-MCNC: 1.9 MG/DL — SIGNIFICANT CHANGE UP (ref 1.6–2.6)
MCHC RBC-ENTMCNC: 24.6 PG — LOW (ref 27–34)
MCHC RBC-ENTMCNC: 33 G/DL — SIGNIFICANT CHANGE UP (ref 32–36)
MCV RBC AUTO: 74.4 FL — LOW (ref 80–100)
PHOSPHATE SERPL-MCNC: 4.7 MG/DL — HIGH (ref 2.5–4.5)
PLATELET # BLD AUTO: 167 K/UL — SIGNIFICANT CHANGE UP (ref 150–400)
POTASSIUM SERPL-MCNC: 3.8 MMOL/L — SIGNIFICANT CHANGE UP (ref 3.5–5.3)
POTASSIUM SERPL-SCNC: 3.8 MMOL/L — SIGNIFICANT CHANGE UP (ref 3.5–5.3)
PROT ?TM UR-MCNC: 90 MG/DL — HIGH (ref 0–12)
RBC # BLD: 2.93 M/UL — LOW (ref 3.8–5.2)
RBC # BLD: 2.93 M/UL — LOW (ref 3.8–5.2)
RBC # FLD: 20.2 % — HIGH (ref 10.3–16.9)
RETICS/RBC NFR: 2.3 % — SIGNIFICANT CHANGE UP (ref 0.5–2.5)
RH IG SCN BLD-IMP: POSITIVE — SIGNIFICANT CHANGE UP
SODIUM SERPL-SCNC: 142 MMOL/L — SIGNIFICANT CHANGE UP (ref 135–145)
WBC # BLD: 6.1 K/UL — SIGNIFICANT CHANGE UP (ref 3.8–10.5)
WBC # FLD AUTO: 6.1 K/UL — SIGNIFICANT CHANGE UP (ref 3.8–10.5)

## 2017-06-02 PROCEDURE — 80053 COMPREHEN METABOLIC PANEL: CPT

## 2017-06-02 PROCEDURE — 36430 TRANSFUSION BLD/BLD COMPNT: CPT

## 2017-06-02 PROCEDURE — 85025 COMPLETE CBC W/AUTO DIFF WBC: CPT

## 2017-06-02 PROCEDURE — 82570 ASSAY OF URINE CREATININE: CPT

## 2017-06-02 PROCEDURE — 86900 BLOOD TYPING SEROLOGIC ABO: CPT

## 2017-06-02 PROCEDURE — 84300 ASSAY OF URINE SODIUM: CPT

## 2017-06-02 PROCEDURE — 93005 ELECTROCARDIOGRAM TRACING: CPT

## 2017-06-02 PROCEDURE — 93010 ELECTROCARDIOGRAM REPORT: CPT

## 2017-06-02 PROCEDURE — 36415 COLL VENOUS BLD VENIPUNCTURE: CPT

## 2017-06-02 PROCEDURE — 81001 URINALYSIS AUTO W/SCOPE: CPT

## 2017-06-02 PROCEDURE — 83935 ASSAY OF URINE OSMOLALITY: CPT

## 2017-06-02 PROCEDURE — 86923 COMPATIBILITY TEST ELECTRIC: CPT

## 2017-06-02 PROCEDURE — 85652 RBC SED RATE AUTOMATED: CPT

## 2017-06-02 PROCEDURE — 83735 ASSAY OF MAGNESIUM: CPT

## 2017-06-02 PROCEDURE — 82040 ASSAY OF SERUM ALBUMIN: CPT

## 2017-06-02 PROCEDURE — 84155 ASSAY OF PROTEIN SERUM: CPT

## 2017-06-02 PROCEDURE — 82553 CREATINE MB FRACTION: CPT

## 2017-06-02 PROCEDURE — 84156 ASSAY OF PROTEIN URINE: CPT

## 2017-06-02 PROCEDURE — 86334 IMMUNOFIX E-PHORESIS SERUM: CPT

## 2017-06-02 PROCEDURE — 83970 ASSAY OF PARATHORMONE: CPT

## 2017-06-02 PROCEDURE — 86901 BLOOD TYPING SEROLOGIC RH(D): CPT

## 2017-06-02 PROCEDURE — 83521 IG LIGHT CHAINS FREE EACH: CPT

## 2017-06-02 PROCEDURE — 83550 IRON BINDING TEST: CPT

## 2017-06-02 PROCEDURE — 82550 ASSAY OF CK (CPK): CPT

## 2017-06-02 PROCEDURE — 82306 VITAMIN D 25 HYDROXY: CPT

## 2017-06-02 PROCEDURE — 82310 ASSAY OF CALCIUM: CPT

## 2017-06-02 PROCEDURE — 85027 COMPLETE CBC AUTOMATED: CPT

## 2017-06-02 PROCEDURE — 99233 SBSQ HOSP IP/OBS HIGH 50: CPT

## 2017-06-02 PROCEDURE — 82668 ASSAY OF ERYTHROPOIETIN: CPT

## 2017-06-02 PROCEDURE — 84100 ASSAY OF PHOSPHORUS: CPT

## 2017-06-02 PROCEDURE — 76775 US EXAM ABDO BACK WALL LIM: CPT

## 2017-06-02 PROCEDURE — 82746 ASSAY OF FOLIC ACID SERUM: CPT

## 2017-06-02 PROCEDURE — 99285 EMERGENCY DEPT VISIT HI MDM: CPT | Mod: 25

## 2017-06-02 PROCEDURE — 93306 TTE W/DOPPLER COMPLETE: CPT

## 2017-06-02 PROCEDURE — 84165 PROTEIN E-PHORESIS SERUM: CPT

## 2017-06-02 PROCEDURE — 83605 ASSAY OF LACTIC ACID: CPT

## 2017-06-02 PROCEDURE — 97161 PT EVAL LOW COMPLEX 20 MIN: CPT

## 2017-06-02 PROCEDURE — 86850 RBC ANTIBODY SCREEN: CPT

## 2017-06-02 PROCEDURE — 71045 X-RAY EXAM CHEST 1 VIEW: CPT

## 2017-06-02 PROCEDURE — 82652 VIT D 1 25-DIHYDROXY: CPT

## 2017-06-02 PROCEDURE — 82330 ASSAY OF CALCIUM: CPT

## 2017-06-02 PROCEDURE — 82010 KETONE BODYS QUAN: CPT

## 2017-06-02 PROCEDURE — 83880 ASSAY OF NATRIURETIC PEPTIDE: CPT

## 2017-06-02 PROCEDURE — 85045 AUTOMATED RETICULOCYTE COUNT: CPT

## 2017-06-02 PROCEDURE — 86335 IMMUNFIX E-PHORSIS/URINE/CSF: CPT

## 2017-06-02 PROCEDURE — P9016: CPT

## 2017-06-02 PROCEDURE — 82607 VITAMIN B-12: CPT

## 2017-06-02 PROCEDURE — 84484 ASSAY OF TROPONIN QUANT: CPT

## 2017-06-02 PROCEDURE — 82728 ASSAY OF FERRITIN: CPT

## 2017-06-02 PROCEDURE — 84466 ASSAY OF TRANSFERRIN: CPT

## 2017-06-02 PROCEDURE — 80048 BASIC METABOLIC PNL TOTAL CA: CPT

## 2017-06-02 RX ORDER — ASCORBIC ACID 60 MG
1 TABLET,CHEWABLE ORAL
Qty: 30 | Refills: 0 | OUTPATIENT
Start: 2017-06-02 | End: 2017-07-02

## 2017-06-02 RX ORDER — FERROUS SULFATE 325(65) MG
1 TABLET ORAL
Qty: 30 | Refills: 0 | OUTPATIENT
Start: 2017-06-02 | End: 2017-07-02

## 2017-06-02 RX ORDER — CALCIUM CARBONATE 500(1250)
1 TABLET ORAL
Qty: 120 | Refills: 0 | OUTPATIENT
Start: 2017-06-02 | End: 2017-07-02

## 2017-06-02 RX ORDER — POTASSIUM CHLORIDE 20 MEQ
20 PACKET (EA) ORAL ONCE
Qty: 0 | Refills: 0 | Status: COMPLETED | OUTPATIENT
Start: 2017-06-02 | End: 2017-06-02

## 2017-06-02 RX ORDER — CALCIUM ACETATE 667 MG
1334 TABLET ORAL
Qty: 120060 | Refills: 0 | OUTPATIENT
Start: 2017-06-02 | End: 2017-07-02

## 2017-06-02 RX ORDER — MAGNESIUM SULFATE 500 MG/ML
1 VIAL (ML) INJECTION ONCE
Qty: 0 | Refills: 0 | Status: COMPLETED | OUTPATIENT
Start: 2017-06-02 | End: 2017-06-02

## 2017-06-02 RX ORDER — POLYETHYLENE GLYCOL 3350 17 G/17G
17 POWDER, FOR SOLUTION ORAL
Qty: 255 | Refills: 0 | OUTPATIENT
Start: 2017-06-02 | End: 2017-07-02

## 2017-06-02 RX ORDER — CALCIUM ACETATE 667 MG
1 TABLET ORAL
Qty: 90 | Refills: 0 | OUTPATIENT
Start: 2017-06-02 | End: 2017-07-02

## 2017-06-02 RX ORDER — POLYETHYLENE GLYCOL 3350 17 G/17G
17 POWDER, FOR SOLUTION ORAL
Qty: 510 | Refills: 0 | OUTPATIENT
Start: 2017-06-02 | End: 2017-07-02

## 2017-06-02 RX ADMIN — Medication 100 GRAM(S): at 10:26

## 2017-06-02 RX ADMIN — Medication 1 TABLET(S): at 05:43

## 2017-06-02 RX ADMIN — Medication 325 MILLIGRAM(S): at 11:53

## 2017-06-02 RX ADMIN — Medication 20 MILLIEQUIVALENT(S): at 10:27

## 2017-06-02 RX ADMIN — Medication 1334 MILLIGRAM(S): at 15:25

## 2017-06-02 RX ADMIN — Medication 1 TABLET(S): at 11:54

## 2017-06-02 RX ADMIN — POLYETHYLENE GLYCOL 3350 17 GRAM(S): 17 POWDER, FOR SOLUTION ORAL at 11:54

## 2017-06-02 RX ADMIN — Medication 500 MILLIGRAM(S): at 11:53

## 2017-06-02 RX ADMIN — Medication 5 MILLIGRAM(S): at 05:43

## 2017-06-02 RX ADMIN — CARVEDILOL PHOSPHATE 6.25 MILLIGRAM(S): 80 CAPSULE, EXTENDED RELEASE ORAL at 10:27

## 2017-06-02 RX ADMIN — Medication 25 MILLIGRAM(S): at 15:25

## 2017-06-02 RX ADMIN — Medication 25 MILLIGRAM(S): at 05:43

## 2017-06-02 RX ADMIN — HEPARIN SODIUM 5000 UNIT(S): 5000 INJECTION INTRAVENOUS; SUBCUTANEOUS at 05:43

## 2017-06-02 RX ADMIN — HEPARIN SODIUM 5000 UNIT(S): 5000 INJECTION INTRAVENOUS; SUBCUTANEOUS at 18:40

## 2017-06-02 RX ADMIN — Medication 1 TABLET(S): at 00:11

## 2017-06-02 RX ADMIN — CALCITRIOL 0.5 MICROGRAM(S): 0.5 CAPSULE ORAL at 11:54

## 2017-06-02 RX ADMIN — Medication 1334 MILLIGRAM(S): at 18:40

## 2017-06-02 RX ADMIN — Medication 1334 MILLIGRAM(S): at 10:27

## 2017-06-02 RX ADMIN — Medication 1 TABLET(S): at 18:40

## 2017-06-02 NOTE — PROGRESS NOTE ADULT - SUBJECTIVE AND OBJECTIVE BOX
PROGRESS NOTE    OVERNIGHT  HENRY    SUBJECTIVE  No acute complaints this AM.   ROS grossly negative    Allergies  angiotensin converting enzyme inhibitors (Angioedema)  aspirin (Hives; Rash)  lactose (Diarrhea)  penicillin (Angioedema)  Seafood (Short breath)  shellfish (Short breath)  spinach (Unknown)    OBJECTIVE  PHYSICAL EXAM:  T(C): 36.9, Max: 37.1 (06-02 @ 05:39)  HR: 66 (62 - 75)  BP: 127/65 (127/65 - 150/73)  RR: 17 (14 - 18)  SpO2: 100% (97% - 100%)    I&O's Summary    I & Os for current day (as of 02 Jun 2017 11:40)  =============================================  IN: 270 ml / OUT: 600 ml / NET: -330 ml    Appearance: NAD. Speaking in full sentences.   HEENT:   Conjunctiva clear b/l. Moist oral mucosa.  Cardiovascular: RRR. 2/6 early systolic murmur louds on LLSB.   Respiratory: Lungs CTAB. No wheezes, rales, or rhonchi b/l.   Gastrointestinal:  Soft, nontender. No rebound. No rigidity. Minimal distension. 	  Extremities: No edema b/l. No erythema b/l. No calf tenderness b/l.   Vascular: DP 2+ b/l.  Neurologic:  Awake and alert. Moving all extremities. Following commands.  	  LABS:                        7.2    6.1   )-----------( 167      ( 02 Jun 2017 06:05 )             21.8     06-02    142  |  105  |  77<H>  ----------------------------<  100<H>  3.8   |  17<L>  |  3.70<H>    Ca    6.4<LL>      02 Jun 2017 06:05  Phos  4.7     06-02  Mg     1.9     06-02    TPro  5.3<L>  /  Alb  x   /  TBili  x   /  DBili  x   /  AST  x   /  ALT  x   /  AlkPhos  x   06-01      Urinalysis Basic - ( 01 Jun 2017 14:08 )    Color: x / Appearance: x / SG: x / pH: x  Gluc: x / Ketone: x  / Bili: x / Urobili: x   Blood: x / Protein: x / Nitrite: x   Leuk Esterase: x / RBC: < 5 /HPF / WBC Many /HPF   Sq Epi: x / Non Sq Epi: Rare /HPF / Bacteria: Many /HPF    RADIOLOGY & ADDITIONAL TESTS:  Reviewed by myself and with medical team.    MEDICATIONS  (STANDING):  carvedilol 6.25milliGRAM(s) Oral every 12 hours  hydrALAZINE 25milliGRAM(s) Oral three times a day  tamsulosin 0.4milliGRAM(s) Oral at bedtime  predniSONE   Tablet 5milliGRAM(s) Oral daily  heparin  Injectable 5000Unit(s) SubCutaneous every 12 hours  atorvastatin 40milliGRAM(s) Oral at bedtime  calcitriol   Capsule 0.5MICROGram(s) Oral daily  epoetin suhas Injectable 13992Vexu(s) SubCutaneous every 7 days  calcium acetate 1334milliGRAM(s) Oral three times a day with meals  ferrous    sulfate 325milliGRAM(s) Oral daily  ascorbic acid 500milliGRAM(s) Oral daily  polyethylene glycol 3350 17Gram(s) Oral daily  calcium carbonate 1250 mG (OsCal) 1Tablet(s) Oral every 6 hours    ASSESSMENT / PLAN  85F PMH HTN, CKD 4, NSTEMI, Takotsubo cardiomyopathy (EF now 65% from 25%), SBO s/p resection 10/2016, anemia, hypocalcemia, temporal arteritis presented 5/31 in setting of decreased PO intake and malaise who was initially admitted to [facility] for hypocalcemia (4.9 corrected) and hypomagnesemia (0.6) likely multifactorial, now on RMF and improved w/ supplementation and phosphate binders.     RENAL / METABOLIC   #Hypocalcemia -- IMPROVED. Asymptomatic w/ nrml WTC. Suspect 2/2 secondary poor PO intake, CKD, hyperphosphatemia, and vit D def.   - S/P 1g calcium gluconate on admission.   - calcitriol   Capsule 0.5MICROGram(s) Oral daily  - calcium acetate 1334milliGRAM(s) Oral three times a day with meals  - calcium carbonate 1250 mg -- 1Tablet(s) Oral every 6 hours  - Repeat EKG in AM x 1.   - Goal corrected Ca >7     #Hypomagnesemia -- likely 2/2 decreased PO intake vs. PPI, resolved w/ repletion.  - dc PPI  - trend Mg daily    #CKD - presumably 2/2 HTN, appears to have nephrotic-range proteinuria.  - Dr. Bryan following, appreciate recs  - Renal US  - F/u plasma cell dyscrasia w/u.   - Trend all lytes daily, even phosphorus.     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     #Anion gap metabolic acidosis -- likely from CKD. 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   -Manage above conditions.  -BMP daily.  -Renal on board.     HEME:   #Microcytic Anemia -- Hgb 7 on admission from 9 on May 6th. Suspect 2/2 ACD 2/2 temporal arteritis (ESR elevated to 92) as well as CKD. Guaiac negative. The patient was transfused 1 unit of PRBC for anemia in the ED because of concern for demand ischemia given history of NSTEMI which is currently not thought to be the case. EKG w/o clear ischemic changes (TWI noted by admission team not meeting significant TWI criteria). Troponin peaked.  - Dr. Bryan following, appreciate recs  - epoetin suhas Injectable 08177Idqi(s) SubCutaneous every 7 days  - ferrous    sulfate 325milliGRAM(s) Oral daily  - ascorbic acid 500milliGRAM(s) Oral daily  - polyethylene glycol 3350 17Gram(s) Oral daily  - trend Hgb daily, keep type and screen current, maintain two large bore IV lines.   - f/u FOBT  - F/u reticulocyte count.  - F/u plasma cell dyscrasia w/u     CV  #Chest pain (RESOLVED) -- unlikely ACS.   -Stat EKG if CP again.  -Cardio on board - no more ischemic w/u.     #Troponinemia -- possibly 2/2 demand ischemia. Chest pain on admission resolved. Trops downtrended. TWI in admission NONSPECIFIC.   -Cards on board -- no need for further ischemic workup. Other recs appreciated     #Moderate AS -- compensated.   -echo repeated per Dr. Jones recs to monitor AS  -repeat echo w/ stable moderate AS.     #Essential HTN  -carvedilol 6.25milliGRAM(s) Oral every 12 hours  -hydrALAZINE 25milliGRAM(s) Oral three times a day -- can increase to home dose of 50.     #Takotsubo cardiomyopathy (RESOLVED): EF recovered, 65% in 3/2017  -Repeat ECHO as above.     #Moderate pericardial effusion -- no signs of tamponade on exam. VSS.   -Repeat ECHO as above.       #BPH  -C/w flomax    RHEUM  #Temporal arteritis:  - c/w home prednisone 5mg PO QD  - will attempt to obtain collateral from pt's outpt rheumatologist    #F: none  #E: cautious repletion in setting of CKD 4  #N: DASH/renal diet, f/u Nutrition consult given poor appetite at home    #PPX: HSQ    #DISPO:  PT recs and pending above.     #CODE: DNR/DNI PROGRESS NOTE    OVERNIGHT  HENRY    SUBJECTIVE  No acute complaints this AM.   ROS grossly negative    Allergies  angiotensin converting enzyme inhibitors (Angioedema)  aspirin (Hives; Rash)  lactose (Diarrhea)  penicillin (Angioedema)  Seafood (Short breath)  shellfish (Short breath)  spinach (Unknown)    OBJECTIVE  PHYSICAL EXAM:  T(C): 36.9, Max: 37.1 (06-02 @ 05:39)  HR: 66 (62 - 75)  BP: 127/65 (127/65 - 150/73)  RR: 17 (14 - 18)  SpO2: 100% (97% - 100%)    I&O's Summary    I & Os for current day (as of 02 Jun 2017 11:40)  =============================================  IN: 270 ml / OUT: 600 ml / NET: -330 ml    Appearance: NAD. Speaking in full sentences.   HEENT:   Conjunctiva clear b/l. Moist oral mucosa.  Cardiovascular: RRR. 2/6 early systolic murmur louds on LLSB.   Respiratory: Lungs CTAB. No wheezes, rales, or rhonchi b/l.   Gastrointestinal:  Soft, nontender. No rebound. No rigidity. Minimal distension. 	  Extremities: No edema b/l. No erythema b/l. No calf tenderness b/l.   Vascular: DP 2+ b/l.  Neurologic:  Awake and alert. Moving all extremities. Following commands.  	  LABS:                        7.2    6.1   )-----------( 167      ( 02 Jun 2017 06:05 )             21.8     06-02    142  |  105  |  77<H>  ----------------------------<  100<H>  3.8   |  17<L>  |  3.70<H>    Ca    6.4<LL>      02 Jun 2017 06:05  Phos  4.7     06-02  Mg     1.9     06-02    TPro  5.3<L>  /  Alb  x   /  TBili  x   /  DBili  x   /  AST  x   /  ALT  x   /  AlkPhos  x   06-01      Urinalysis Basic - ( 01 Jun 2017 14:08 )    Color: x / Appearance: x / SG: x / pH: x  Gluc: x / Ketone: x  / Bili: x / Urobili: x   Blood: x / Protein: x / Nitrite: x   Leuk Esterase: x / RBC: < 5 /HPF / WBC Many /HPF   Sq Epi: x / Non Sq Epi: Rare /HPF / Bacteria: Many /HPF    RADIOLOGY & ADDITIONAL TESTS:  Reviewed by myself and with medical team.    MEDICATIONS  (STANDING):  carvedilol 6.25milliGRAM(s) Oral every 12 hours  hydrALAZINE 25milliGRAM(s) Oral three times a day  tamsulosin 0.4milliGRAM(s) Oral at bedtime  predniSONE   Tablet 5milliGRAM(s) Oral daily  heparin  Injectable 5000Unit(s) SubCutaneous every 12 hours  atorvastatin 40milliGRAM(s) Oral at bedtime  calcitriol   Capsule 0.5MICROGram(s) Oral daily  epoetin suhas Injectable 54305Jjfw(s) SubCutaneous every 7 days  calcium acetate 1334milliGRAM(s) Oral three times a day with meals  ferrous    sulfate 325milliGRAM(s) Oral daily  ascorbic acid 500milliGRAM(s) Oral daily  polyethylene glycol 3350 17Gram(s) Oral daily  calcium carbonate 1250 mG (OsCal) 1Tablet(s) Oral every 6 hours    ASSESSMENT / PLAN  85F PMH HTN, CKD 4, NSTEMI, Takotsubo cardiomyopathy (EF now 65% from 25%), SBO s/p resection 10/2016, anemia, hypocalcemia, temporal arteritis presented 5/31 in setting of decreased PO intake and malaise who was initially admitted to [facility] for hypocalcemia (4.9 corrected) and hypomagnesemia (0.6) likely multifactorial, now on RMF and improved w/ supplementation and phosphate binders.     RENAL / METABOLIC   #Hypocalcemia -- IMPROVED. Asymptomatic w/ nrml WTC. Suspect 2/2 secondary poor PO intake, CKD, hyperphosphatemia, and vit D def.   - S/P 1g calcium gluconate on admission.   - calcitriol   Capsule 0.5MICROGram(s) Oral daily  - calcium acetate 1334milliGRAM(s) Oral three times a day with meals  - calcium carbonate 1250 mg -- 1Tablet(s) Oral every 6 hours  - No need to repeat EKGs.    #Hypomagnesemia -- likely 2/2 decreased PO intake vs. PPI, resolved w/ repletion.  - dc PPI  - trend Mg daily  - Replete to keep >2.      #CKD - presumably 2/2 HTN, appears to have nephrotic-range proteinuria.  - Dr. Bryan following, appreciate recs  - Renal US  - F/u plasma cell dyscrasia w/u.   - Trend all lytes daily, even phosphorus and replete prn.     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     #Anion gap metabolic acidosis -- IMPROVED. likely from CKD.   -Manage above conditions.  -BMP daily.  -Renal on board.     HEME:   #Microcytic Anemia --  Suspect 2/2 TIERNEY and ACD 2/2 temporal arteritis (ESR elevated to 92) as well as CKD. Reticulocyte index suggest inadequate bone marrow response consistent with TIERNEY.   - Dr. Bryan following, appreciate recs  - epoetin suhas Injectable 71425Weei(s) SubCutaneous every 7 days  - ferrous    sulfate 325milliGRAM(s) Oral daily  - ascorbic acid 500milliGRAM(s) Oral daily  - polyethylene glycol 3350 17Gram(s) Oral daily  - trend Hgb daily, keep type and screen current, maintain two large bore IV lines.   - f/u FOBT  - F/u plasma cell dyscrasia w/u     CV  #Chest pain (RESOLVED) -- unlikely ACS.   -Stat EKG if CP again.  -Cardio on board - no more ischemic w/u.     #Troponinemia -- possibly 2/2 demand ischemia. Chest pain on admission resolved. Trops downtrended. TWI in admission NONSPECIFIC.   -Cards on board -- no need for further ischemic workup. Other recs appreciated     #Moderate AS -- compensated.   -repeat echo when possible.     #Essential HTN  -carvedilol 6.25milliGRAM(s) Oral every 12 hours  -hydrALAZINE 25milliGRAM(s) Oral three times a day -- can increase to home dose of 50.     #Takotsubo cardiomyopathy (RESOLVED): EF recovered, 65% in 3/2017  -Repeat ECHO as above.     #Moderate pericardial effusion -- no signs of tamponade on exam. VSS.   -Repeat ECHO as above.       #On flomax  -Unclear reason why, but possible renal stone expulsion vs bladder outlet obstruction.   -Need collateral.     RHEUM  #Temporal arteritis:  - c/w home prednisone 5mg PO QD  - will attempt to obtain collateral from pt's outpt rheumatologist    #F: none  #E: cautious repletion in setting of CKD 4  #N: DASH/renal diet, f/u Nutrition consult given poor appetite at home    #PPX: HSQ    #DISPO:  PT recs and pending above.     #CODE: DNR/DNI

## 2017-06-02 NOTE — DISCHARGE NOTE ADULT - MEDICATION SUMMARY - MEDICATIONS TO STOP TAKING
I will STOP taking the medications listed below when I get home from the hospital:    potassium chloride 10 mEq oral capsule, extended release  -- 1 cap(s) by mouth 2 times a day  -- It is very important that you take or use this exactly as directed.  Do not skip doses or discontinue unless directed by your doctor.  Medication should be taken with plenty of water.  Swallow whole.  Do not crush.  Take with food or milk.    Oysco 500 with D 500 mg-200 intl units oral tablet  -- 1 tab(s) by mouth 2 times a day    pantoprazole 40 mg oral delayed release tablet  -- 1 tab(s) by mouth once a day (before a meal)    Vitamin D3 400 intl units oral capsule  -- 1 cap(s) by mouth once a day    ferrous sulfate 325 mg oral tablet  -- 1 tab(s) by mouth 2 times a day  -- Check with your doctor before becoming pregnant.  Do not chew, break, or crush.  May discolor urine or feces.    Lasix 20 mg oral tablet  -- 2 tabs every other day  -- Avoid prolonged or excessive exposure to direct and/or artificial sunlight while taking this medication.  It is very important that you take or use this exactly as directed.  Do not skip doses or discontinue unless directed by your doctor.  It may be advisable to drink a full glass orange juice or eat a banana daily while taking this medication.

## 2017-06-02 NOTE — DIETITIAN INITIAL EVALUATION ADULT. - OTHER INFO
86 y/o female with noted hypocalcemia.Reported 30lb weight loss over 4-6months as per patient due to bowel surgery and diminished appetite.Patient does not follow any specific restrictions in her diet.Suspect very poor nutrition PTA.Admittedly was eating little.Declines all supplements"they go thru me"

## 2017-06-02 NOTE — DISCHARGE NOTE ADULT - INSTRUCTIONS
DIET RECOMMENDATIONS:  Low salt diet (less than 1500 mg per day).  Low fat diet. Avoid fried foods. Avoid red meat.  Eat a salad a day. Can also eat avocado and almonds with your salad.

## 2017-06-02 NOTE — PROGRESS NOTE ADULT - PROVIDER SPECIALTY LIST ADULT
Cardiology
Internal Medicine
Nephrology
Rehab Medicine
Internal Medicine
Nephrology

## 2017-06-02 NOTE — PROGRESS NOTE ADULT - ASSESSMENT
85F PMH HTN, CKD 4, NSTEMI, Takotsubo cardiomyopathy (EF now 65% from 25%), SBO s/p resection 10/2016, anemia, hypocalcemia, temporal arteritis presented 5/31 in setting of decreased PO intake and malaise who was initially admitted to [facility] for hypocalcemia (4.9 corrected) and hypomagnesemia (0.6) likely multifactorial, now on RMF and improved w/ supplementation and phosphate binders.     RENAL / METABOLIC   #Hypocalcemia -- IMPROVED. Asymptomatic w/ nrml WTC. Suspect 2/2 secondary poor PO intake, CKD, hyperphosphatemia, and vit D def.   - S/P 1g calcium gluconate on admission.   - calcitriol   Capsule 0.5MICROGram(s) Oral daily  - calcium acetate 1334milliGRAM(s) Oral three times a day with meals  - calcium carbonate 1250 mg -- 1Tablet(s) Oral every 6 hours  - No need to repeat EKGs.    #Hypomagnesemia -- likely 2/2 decreased PO intake vs. PPI, resolved w/ repletion.  - dc PPI  - trend Mg daily  - Replete to keep >2.      #CKD - presumably 2/2 HTN, appears to have nephrotic-range proteinuria.  - Dr. Bryan following, appreciate recs  - Renal US  - F/u plasma cell dyscrasia w/u.   - Trend all lytes daily, even phosphorus and replete prn.     #Secondary hyper-PTH -- 2/2 CKD  HEME:   #Microcytic Anemia --  Suspect 2/2 TIERNEY and ACD 2/2 temporal arteritis (ESR elevated to 92) as well as CKD. Reticulocyte index suggest inadequate bone marrow response consistent with TIERNEY.   - Dr. Bryan following, appreciate recs  - epoetin suhas Injectable 28492Oqsu(s) SubCutaneous every 7 days  - ferrous    sulfate 325milliGRAM(s) Oral daily  - ascorbic acid 500milliGRAM(s) Oral daily  - polyethylene glycol 3350 17Gram(s) Oral daily  - trend Hgb daily, keep type and screen current, maintain two large bore IV lines.   - f/u FOBT  - F/u plasma cell dyscrasia w/u     CV  #Chest pain (RESOLVED) -- unlikely ACS.   -Stat EKG if CP again.  -Cardio on board - no more ischemic w/u.     #Troponinemia -- possibly 2/2 demand ischemia. Chest pain on admission resolved. Trops downtrended. TWI in admission NONSPECIFIC.   -Cards on board -- no need for further ischemic workup. Other recs appreciated -manage CKD.     #Anion gap metabolic acidosis -- IMPROVED. likely from CKD.   -Manage above conditions.  -BMP daily.  -Renal on board.

## 2017-06-02 NOTE — DISCHARGE NOTE ADULT - MEDICATION SUMMARY - MEDICATIONS TO TAKE
I will START or STAY ON the medications listed below when I get home from the hospital:    predniSONE 5 mg oral delayed release tablet  -- 1 tab(s) by mouth once a day  -- Indication: For Temporal arteritis     acetaminophen 325 mg oral tablet  -- 2 tab(s) by mouth every 6 hours, As needed, Moderate Pain (4 - 6)  -- Indication: For pain    calcium carbonate 1250 mg (500 mg elemental calcium) oral tablet  -- 1 tab(s) by mouth every 6 hours  -- Indication: For low calcium    tamsulosin 0.4 mg oral capsule  -- 1 cap(s) by mouth once a day (at bedtime)  -- Indication: For As per your home medications -- follow up with your primary care doctor    loratadine 10 mg oral tablet  -- 1 tab(s) by mouth once a day  -- Indication: For Allergies    Coreg 6.25 mg oral tablet  -- 1 tab(s) by mouth 2 times a day  -- Indication: For Hypertension    levalbuterol 0.63 mg/3 mL inhalation solution  -- 3 milliliter(s) inhaled every 4 hours, As needed, shortness of breath  -- Indication: For Shortness of breath as needed    Advair Diskus 250 mcg-50 mcg inhalation powder  -- 1 puff(s) inhaled PRN  -- Indication: For Shortness of breath as needed    FeroSul 325 mg (65 mg elemental iron) oral tablet  -- 1 tab(s) by mouth once a day  -- Check with your doctor before becoming pregnant.  Do not chew, break, or crush.  May discolor urine or feces.    -- Indication: For iron deficiency    polyethylene glycol 3350 oral powder for reconstitution  -- 17 gram(s) by mouth every other day  -- Dilute this medication with liquid before administration.  It is very important that you take or use this exactly as directed.  Do not skip doses or discontinue unless directed by your doctor.    -- Indication: For To prevent constipation from iron tablets    calcium acetate 667 mg oral tablet  -- 1334 milligram(s) by mouth 3 times a day with meals  -- Indication: For for high phosphorus    hydrALAZINE 50 mg oral tablet  -- 1 tab(s) by mouth 3 times a day  -- Indication: For Hypertension    cyanocobalamin 1000 mcg oral tablet  -- 1 tab(s) by mouth once a day  -- Indication: For Supplement    folic acid 1 mg oral tablet  -- 1 tab(s) by mouth once a day  -- Indication: For Supplement    thiamine 100 mg oral tablet  -- 1 tab(s) by mouth once a day  -- Indication: For Supplement    Acerola 500 mg oral tablet, chewable  -- 1 tab(s) by mouth once a day  -- Chew tablets before swallowing    -- Indication: For To help with iron absorption

## 2017-06-02 NOTE — PROGRESS NOTE ADULT - ATTENDING COMMENTS
pt seen and examined by me. case d/w housestaff. agree with VS, PE assessment and plan as outlined above.
pt seen and examined; reviewed vs, labs, ekg, chart notes  pt w/o complaints, denies cp, paresthesias, dyspnea, muscle spasms; still urinates     PE  gen: awake, alert, elderly, frail, NAD  heent: perrla, no photophobia b/l, mmm, no JVD; negative Chvostek's sign b/l   cvs: +holosystolic murmur  abd: nt/nd  ext : no lower ext edema b/l, no lower ext tenderness b/l ; moves all 4 extremities; negative Trousseau's sign on left arm    a/p:   1. hypocalcemia: chronic, no signs of acute hypocalcemia; repleting w / PO meds as noted above, monitor QTc, follow up renal recs  2. hypomagnesemia: monitor Mg, avoid PPI  3. CKD/ secondary PTH/ metabolic acidosis: trend renal function/ lytes; follow up renal recs ; renal sonogram pending; immunofixation studies pending  4. microcytic anemia: s/p 1 U PRBC; monitor CBC; FOBT pending; follow up immunofixation studies; on iron supplementation and procrit.   5. chest pain : resolved, troponins peaked; follow up cardiology recs; ECHO results noted (moderate AS, moderate pericardial effusion, normal EF)  6. temporal arteritis: on prednisone   7. clarify why patient is on flomax, she does not have BPH as stated above, it maybe be for renal stone expulsion vs bladder outlet obstruction

## 2017-06-02 NOTE — PROGRESS NOTE ADULT - SUBJECTIVE AND OBJECTIVE BOX
Chief Complaint/Reason for Consult: CHF  INTERVAL HPI: no acute evens overnight nad  	  MEDICATIONS:  carvedilol 6.25milliGRAM(s) Oral every 12 hours  hydrALAZINE 25milliGRAM(s) Oral three times a day  tamsulosin 0.4milliGRAM(s) Oral at bedtime          polyethylene glycol 3350 17Gram(s) Oral daily    predniSONE   Tablet 5milliGRAM(s) Oral daily  atorvastatin 40milliGRAM(s) Oral at bedtime    heparin  Injectable 5000Unit(s) SubCutaneous every 12 hours  calcitriol   Capsule 0.5MICROGram(s) Oral daily  epoetin suhas Injectable 40828Kiha(s) SubCutaneous every 7 days  calcium acetate 1334milliGRAM(s) Oral three times a day with meals  ferrous    sulfate 325milliGRAM(s) Oral daily  ascorbic acid 500milliGRAM(s) Oral daily  calcium carbonate 1250 mG (OsCal) 1Tablet(s) Oral every 6 hours      REVIEW OF SYSTEMS:  [x] As per HPI  CONSTITUTIONAL: No fever, weight loss, or fatigue  RESPIRATORY: No cough, wheezing, chills or hemoptysis; No Shortness of Breath  CARDIOVASCULAR: No chest pain, palpitations, dizziness, or leg swelling  GASTROINTESTINAL: No abdominal or epigastric pain. No nausea, vomiting, or hematemesis; No diarrhea or constipation. No melena or hematochezia.  MUSCULOSKELETAL: No joint pain or swelling; No muscle, back, or extremity pain  [x] All others negative	  [ ] Unable to obtain    PHYSICAL EXAM:  T(C): 36.9, Max: 37.1 (06-02 @ 05:39)  HR: 66 (62 - 75)  BP: 127/65 (127/65 - 136/66)  RR: 17 (17 - 18)  SpO2: 100% (97% - 100%)  Wt(kg): --  I&O's Summary    I & Os for current day (as of 02 Jun 2017 15:14)  =============================================  IN: 270 ml / OUT: 600 ml / NET: -330 ml        Appearance: Normal	  HEENT:   Normal oral mucosa  Cardiovascular: Normal S1 S2, No JVD, No murmurs, No edema  Respiratory: Lungs clear to auscultation	  Gastrointestinal:  Soft, Non-tender, + BS	  Extremities: Normal range of motion, No clubbing, cyanosis or edema  Vascular: Peripheral pulses palpable 2+ bilaterally    TELEMETRY: 	    ECG:    	  RADIOLOGY:   CXR:  CT:  US:    CARDIAC TESTING:  Echocardiogram:  Catheterization:  Stress Test:      LABS:	 	    CARDIAC MARKERS:                                  7.2    6.1   )-----------( 167      ( 02 Jun 2017 06:05 )             21.8     06-02    142  |  105  |  77<H>  ----------------------------<  100<H>  3.8   |  17<L>  |  3.70<H>    Ca    6.4<LL>      02 Jun 2017 06:05  Phos  4.7     06-02  Mg     1.9     06-02    TPro  5.3<L>  /  Alb  x   /  TBili  x   /  DBili  x   /  AST  x   /  ALT  x   /  AlkPhos  x   06-01    proBNP:   Lipid Profile:   HgA1c:   TSH:     ASSESSMENT/PLAN: 	    Problem/Plan - 1:    ·  Problem: CAD (coronary artery disease).   Troponin peaked 2/2 demand.  No ASA due to ASA allergy. No Plavix due to h/o Anemia. Cont. Coreg, Lipitor  No further ischemia work-up at this time     Echo 1/24/16: mild concentric LVH. apical mild HK 55%, impaired left ventricular relaxation with mildly elevated left atrial pressure (8-14mmHg), Moderate AS (FERDINAND 1.2 cm2), The peak pressure gradient is 27mmHg, mean pressure gradient is 16 mmHg, mild to moderate AR, moderate pericardial effusion noted. EF improved from prior.     Please repeat echo to eval effusion and AI - unchanged AI and effusion  no clinical signs of tamponade, will defer pericardiocentesis for now.    Problem/Plan - 2:  ·  Problem: Chronic systolic congestive heart failure.  Plan: repeat Echo  No ACEI/ARB due to ACEI allergy and CKD.   Hold Lasix due to acute hypokalemia, acute hypocalcemia and acute hypomagnesemia, in Cr. from baseline on admission. Lungs CTA B/L. Euvolemic.   f/u need for lasix per Renal recs    Problem/Plan - 3:  ·  Problem: Essential hypertension.  Plan: Cont. Coreg, Hydralazine. Lasix on hold.

## 2017-06-02 NOTE — DIETITIAN INITIAL EVALUATION ADULT. - NS AS NUTRI INTERV MEALS SNACK
warrants protein restriction due to CKD  stage 4/General/healthful diet/Protein - modified diet/Energy - modified diet

## 2017-06-02 NOTE — DISCHARGE NOTE ADULT - HOSPITAL COURSE
85F PMH HTN, CKD 4, NSTEMI, Takotsubo cardiomyopathy (EF now 65% from 25%), SBO s/p resection 10/2016, anemia, hypocalcemia, temporal arteritis presented 5/31 in setting of decreased PO intake and malaise who was initially admitted to [facility] for hypocalcemia (4.9 corrected) and hypomagnesemia (0.6) likely multifactorial 2/2 CKD and poor oral intake. Total, the patient received 1g CaGlu, 6g MgSO4, and 60 KCL PO. . QTc initially was 470. Repeat after improvement of calcium to higher level was 511 however this was thought to be an error. Repeat EKG this afternoon with Qtc at 472. In regards to the patients chest pain, the patient was transfused 1 unit of PRBC for anemia in the ED because of concern for demand ischemia given history of NSTEMI which is currently not thought to be the case. EKG w/o clear ischemic changes (TWI noted by admission team not meeting significant TWI criteria). Troponin peaked. TTE showed that EF has improved to 65%; diastolic dysfx; moderate AS; moderate MR; moderate pericardial effusion w/o wall motion abnormalities. The patient on presentation and throughout admission did not have paresthesias, numbness, twitching, or palpitations. Her chest pain has resolved. No sob, n, v, d, f, c. Cardio on board and feel that ischemic workup can stop. Recommendation of no ASA, plavix, ACE-I/ARB given intolerance / anemia / CKD. The patient is being seen by renal as well who made recommendations to workup plasma cell dyscrasia given nephrotic range proteinuria w/ SPEP, serum/urine immunofixation. Also recommend procrit, oral calcium supplementation and oral iron supplementation. The patient's electrolyte imbalances improved and she remained asymptomatic.     On the day of discharge, the patient was seen and examined. Symptoms improved. Vital signs are stable. Labs and imaging reviewed. Patient is medically optimized and hemodynamically stable. Return precautions discussed, medication teach back done, and importance of physician followup. The patient verbalized understanding	. 85F PMH HTN, CKD 4, NSTEMI, Takotsubo cardiomyopathy (EF now 65% from 25%), SBO s/p resection 10/2016, anemia, hypocalcemia, temporal arteritis presented 5/31 in setting of decreased PO intake and malaise who was initially admitted to [facility] for hypocalcemia (4.9 corrected) and hypomagnesemia (0.6) likely multifactorial 2/2 CKD and poor oral intake. Total, the patient received 1g CaGlu, 6g MgSO4, and 60 KCL PO. . QTc initially was 470. Repeat after improvement of calcium to higher level was 511 however this was thought to be an error. Repeat EKG this afternoon with Qtc at 472. In regards to the patients chest pain, the patient was transfused 1 unit of PRBC for anemia in the ED because of concern for demand ischemia given history of NSTEMI which is currently not thought to be the case. EKG w/o clear ischemic changes (TWI noted by admission team not meeting significant TWI criteria). Troponin peaked. TTE showed that EF has improved to 65%; diastolic dysfx; moderate AS; moderate MR; moderate pericardial effusion w/o wall motion abnormalities. The patient on presentation and throughout admission did not have paresthesias, numbness, twitching, or palpitations. Her chest pain has resolved. No sob, n, v, d, f, c. Cardio on board and feel that ischemic workup can stop. Recommendation of no ASA, plavix, ACE-I/ARB given intolerance / anemia / CKD. The patient is being seen by renal as well who made recommendations to workup plasma cell dyscrasia given nephrotic range proteinuria w/ SPEP, serum/urine immunofixation. Also recommend procrit, oral calcium supplementation and oral iron supplementation. The patient's electrolyte imbalances improved and she remained asymptomatic.     Patient to follow up with Dr. Hoyt and Dr. Bryan on an outpatient basis.     On the day of discharge, the patient was seen and examined. Symptoms improved. Vital signs are stable. Labs and imaging reviewed. Patient is medically optimized and hemodynamically stable. Return precautions discussed, medication teach back done, and importance of physician followup. The patient verbalized understanding	. 85F PMH HTN, CKD 4, NSTEMI, Takotsubo cardiomyopathy (EF now 65% from 25%), SBO s/p resection 10/2016, anemia, hypocalcemia, temporal arteritis presented 5/31 in setting of decreased PO intake and malaise who was initially admitted to [facility] for hypocalcemia (4.9 corrected) and hypomagnesemia (0.6) likely multifactorial 2/2 CKD and poor oral intake. Total, the patient received 1g CaGlu, 6g MgSO4, and 60 KCL PO. . QTc initially was 470. Repeat after improvement of calcium to higher level was 511 however this was thought to be an error. Repeat EKG this afternoon with Qtc at 472. In regards to the patients chest pain, the patient was transfused 1 unit of PRBC for anemia in the ED because of concern for demand ischemia given history of NSTEMI which is currently not thought to be the case. EKG w/o clear ischemic changes (TWI noted by admission team not meeting significant TWI criteria). Troponin peaked. TTE showed that EF has improved to 65%; diastolic dysfx; moderate AS; moderate MR; moderate pericardial effusion w/o wall motion abnormalities. The patient on presentation and throughout admission did not have paresthesias, numbness, twitching, or palpitations. Her chest pain has resolved. No sob, n, v, d, f, c. Cardio on board and feel that ischemic workup can stop. Recommendation of no ASA, plavix, ACE-I/ARB given intolerance / anemia / CKD. The patient is being seen by renal as well who made recommendations to workup plasma cell dyscrasia given nephrotic range proteinuria w/ SPEP, serum/urine immunofixation. Also recommend procrit, oral calcium supplementation and oral iron supplementation. The patient's electrolyte imbalances improved and she remained asymptomatic.     PT recommended RACHEL, but the patient and her family refused and requested to go home where her family takes care of her.     Patient to follow up with Dr. Hoyt and Dr. Bryan on an outpatient basis.     On the day of discharge, the patient was seen and examined. Symptoms improved. Vital signs are stable. Labs and imaging reviewed. Patient is medically optimized and hemodynamically stable. Return precautions discussed, medication teach back done, and importance of physician followup. The patient verbalized understanding	.

## 2017-06-02 NOTE — DISCHARGE NOTE ADULT - PLAN OF CARE
Follow up your primary care providers. You came in because of weakness. You were found to have severe electrolyte abnormalities such as low calcium, low magnesium, low potassium, and high phosphorus. We treated you with electrolytes supplementation and we gave you a phosphate binder which helped reduce the level. You were seen by a nephrologist who made recommendations and recommended to continue taking the medications listed in this paperwork. Continue to take the medications listed in this paperwork. You have a history of takatsubo cardiomyopathy, but we did an echocardiogram here in the hospital and your heart is functioning much better.  Continue to take the medications listed in this paperwork. Your blood count was NOT severely low however you had some chest discomfort initially and the medical staff felt that transfusing one unit of blood would help your chest discomfort as low blood counts can sometimes cause chest pain in patients with heart disease. Your chest pain went away. A cardiologist saw you and does not feel that you had an acute cardiac even.t   Continue to take the medications listed in this paperwork. As mentioned above.  Continue to take the medications listed in this paperwork. You came in because of weakness. You were found to have severe electrolyte abnormalities such as low calcium, low magnesium, low potassium, and high phosphorus. We treated you with electrolytes supplementation and we gave you a phosphate binder which helped reduce the level. You were seen by a nephrologist who made recommendations and recommended to continue taking the medications listed in this paperwork.    DISCHARGE INSTRUCTIONS  1. Follow up with Dr. Hoyt as soon as possible.  2. After you see Dr. Hoyt, please have him give you a referral to see Dr. Bryan around 2 pm(Nephrology) Your blood count was NOT severely low however you had some chest discomfort initially and the medical staff felt that transfusing one unit of blood would help your chest discomfort as low blood counts can sometimes cause chest pain in patients with heart disease. Your chest pain went away. A cardiologist saw you and does not feel that you had an acute cardiac event.  Continue to take the medications listed in this paperwork.

## 2017-06-02 NOTE — DISCHARGE NOTE ADULT - CARE PROVIDER_API CALL
Jonathan Hoyt (MD), Gastroenterology; Internal Medicine  [address] [place] [address]  Phone: [phone]  Fax: [phone] Jonathan Hoyt), Gastroenterology; Internal Medicine  [address]  [place] [address]  Phone: [phone]  Fax: [phone]    Hector Bryan), Internal Medicine; Nephrology  [address] [place] [address]  Phone: [phone]  Fax: [phone]

## 2017-06-02 NOTE — PROGRESS NOTE ADULT - SUBJECTIVE AND OBJECTIVE BOX
Physical Medicine and Rehabilitation Progress Note:    Patient is a 85y old  Female who presents with a chief complaint of Poor appetite (02 Jun 2017 10:38)      HPI:  85F PMH htn, CKD 4, takotsubo's ef improved, sbo surgically repaired, anemia, hypocalcemia, nstemi managed medically one year ago presenting with chest pain. Intermittent, midsternal. Brought in by daughter as for reduced PO intake. Over the last several days patient says she has lost her appetite and is not taking her medications. She states her strength is good. No paresthesias. No episodes of confusion. ROS otherwise negative for fever/chills, dizzyness, blurry vision,sob, CP, nausea, vomiting, dysuria, focal weakness, rash. Has chronic soft stools following bowel resection from SBO.     ED VS: 98.6 174/84 85 17 99 on RA. New TWI in V4-6. Chest pain resolved. CXR clear. Hgb 7.3. Mg .6, iron 11, calcium 4.5. Crt 4.1 at baseline. Trops .23. Given 1g calcium, then 4g magnesium in ED. NS 1L bolus, plavix 75.   ICU consult called for hypocalcemia, admitted to 7L. (31 May 2017 06:04)                            7.2    6.1   )-----------( 167      ( 02 Jun 2017 06:05 )             21.8       06-02    142  |  105  |  77<H>  ----------------------------<  100<H>  3.8   |  17<L>  |  3.70<H>    Ca    6.4<LL>      02 Jun 2017 06:05  Phos  4.7     06-02  Mg     1.9     06-02    TPro  5.3<L>  /  Alb  x   /  TBili  x   /  DBili  x   /  AST  x   /  ALT  x   /  AlkPhos  x   06-01    Vital Signs Last 24 Hrs  T(C): 36.9, Max: 37.1 (06-02 @ 05:39)  T(F): 98.5, Max: 98.7 (06-02 @ 05:39)  HR: 66 (62 - 75)  BP: 133/54 (127/65 - 133/54)  BP(mean): --  RR: 18 (17 - 18)  SpO2: 100% (97% - 100%)    MEDICATIONS  (STANDING):  carvedilol 6.25milliGRAM(s) Oral every 12 hours  hydrALAZINE 25milliGRAM(s) Oral three times a day  tamsulosin 0.4milliGRAM(s) Oral at bedtime  predniSONE   Tablet 5milliGRAM(s) Oral daily  heparin  Injectable 5000Unit(s) SubCutaneous every 12 hours  atorvastatin 40milliGRAM(s) Oral at bedtime  calcitriol   Capsule 0.5MICROGram(s) Oral daily  epoetin suhas Injectable 11187Lgmp(s) SubCutaneous every 7 days  calcium acetate 1334milliGRAM(s) Oral three times a day with meals  ferrous    sulfate 325milliGRAM(s) Oral daily  ascorbic acid 500milliGRAM(s) Oral daily  polyethylene glycol 3350 17Gram(s) Oral daily  calcium carbonate 1250 mG (OsCal) 1Tablet(s) Oral every 6 hours    MEDICATIONS  (PRN):    Currently Undergoing Physical Therapy at bedside.    Initial Functional Status Assessment:    Previous Level of Function:   · Ambulation Skills	needs device	  · Transfer Skills	needs device	  · ADL Skills	needs device	  · Additional Comments	Pt lives alone in elevator access apt building no stairs to enter, daughter lives in apt unit across the [place]. States that she amb w/ RW at baseline and denies any hx of recent falls. 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 Daughter helps her w/ cooking and grocery shopping	    Cognitive Status Examination:   · Orientation	oriented to person, place, time and situation	  · Level of Consciousness	alert	  · Follows Commands and Answers Questions	100% of the time	  · Personal Safety and Judgment	impaired; slightly impulsive, doesn't wait for instruction before transfers and during ambulation	    Range of Motion Exam:   · Range of Motion Examination	no ROM deficits were identified	    Manual Muscle Testing:   · Manual Muscle Testing Results	grossly at least 3+/5 2/2 ability to perform functional mob	    Bed Mobility: Rolling/Turning:   · Level of [place]	supervision	  · Physical Assist/Nonphysical Assist	supervision; verbal cues; nonverbal cues (demo/gestures)	  · Assistive Device	bed rails	    Bed Mobility: Sit to Supine:   · Level of [place]	NA	    Bed Mobility: Supine to Sit:   · Level of [place]	supervision	  · Physical Assist/Nonphysical Assist	verbal cues; supervision; nonverbal cues (demo/gestures)	  · Assistive Device	bed rails	    Bed Mobility Analysis:   · Impairments Contributing to Impaired Bed Mobility	decreased strength	    Transfer: Sit to Stand:   · Level of [place]	minimum assist (75% patients effort)	  · Physical Assist/Nonphysical Assist	1 person assist; verbal cues; nonverbal cues (demo/gestures)	  · Assistive Device	rolling walker	    Transfer: Stand to Sit:   · Level of [place]	minimum assist (75% patients effort)	  · Physical Assist/Nonphysical Assist	1 person assist; nonverbal cues (demo/gestures); verbal cues	  · Assistive Device	rolling walker	    Sit/Stand Transfer Safety Analysis:   · Transfer Safety Concerns Noted	decreased balance during turns; decreased safety awareness; losing balance; decreased sequencing ability; decreased step length	  · Impairments Contributing to Impaired Transfers	impaired balance; decreased strength	  Gait Skills:   · Level of [place]	minimum assist (75% patients effort)	  · Physical Assist/Nonphysical Assist	1 person assist; nonverbal cues (demo/gestures); verbal cues	  · Assistive Device	rolling walker	  · Gait Distance	25 feet	    Gait Analysis:   · Gait Pattern Used	3-point gait	  · Gait Deviations Noted	decreased mitchell; increased time in double stance; decreased step length	  · Impairments Contributing to Gait Deviations	impaired balance; decreased strength	    Stair Negotiation:   · Level of [place]	NA	    Balance Skills Assessment:   · Sitting Balance: Static	normal balance	  · Sitting Balance: Dynamic	good balance	  · Sit-to-Stand Balance	poor balance	  · Standing Balance: Static	fair balance	  · Standing Balance: Dynamic	poor balance	  · Identified Impairments Contributing to Balance Disturbance	decreased strength; impaired coordination	    Sensory Examination:   Sensory Examination:  · Coordination Assessed	finger to nose; mild impairment BUE	    Proprioception:   · Coordination Assessed	finger to nose; mild impairment BUE	      Treatment Location:   · Comments	Max VCs for visual tracking, unable to see in L visual field without turning head to the L. Pt states that she is blind in the L eye and had impaired vision in the R eye	    Clinical Impressions:   · Criteria for Skilled Therapeutic Interventions	impairments found; functional limitations in following categories; risk reduction/prevention; rehab potential; therapy frequency; anticipated discharge recommendation	  · Impairments Found (describe specific impairments)	aerobic capacity/endurance; fine motor; gait, locomotion, and balance; muscle strength; poor safety awareness	  · Functional Limitations in Following Categories (describe specific limitations)	self-care; home management; community/leisure	  · Risk Reduction/Prevention (Describe Specific Areas of risk reduction/prevention)	risk factors	  · Risk Areas	fall; safety awareness	  · Rehab Potential	fair, will monitor progress closely	  · Therapy Frequency	2-3x/week	    PM&R Impression: as above    Disposition plan recommendations: subacute rehab placement

## 2017-06-02 NOTE — CHART NOTE - NSCHARTNOTEFT_GEN_A_CORE
Upon Nutritional Assessment by the Registered Dietitian your patient was determined to meet criteria / has evidence of the following diagnosis/diagnoses:          [ ]  Mild Protein Calorie Malnutrition        [ ]  Moderate Protein Calorie Malnutrition        [x ] Severe Protein Calorie Malnutrition        [ ] Unspecified Protein Calorie Malnutrition        [ ] Underweight / BMI <19        [ ] Morbid Obesity / BMI > 40      Findings as based on:  •  Comprehensive nutrition assessment and consultation  •  Calorie counts (nutrient intake analysis)  •  Food acceptance and intake status from observations by staff  •  Follow up  •  Patient education  •  Intervention secondary to interdisciplinary rounds  •   concerns      Treatment:    The following diet has been recommended:      PROVIDER Section:     By signing this assessment you are acknowledging and agree with the diagnosis/diagnoses assigned by the Registered Dietitian    Comments:

## 2017-06-02 NOTE — DISCHARGE NOTE ADULT - SECONDARY DIAGNOSIS.
Chronic kidney disease Cardiomyopathy, unspecified type Essential hypertension Anemia, iron deficiency Hypocalcemia Hypomagnesemia

## 2017-06-02 NOTE — DIETITIAN INITIAL EVALUATION ADULT. - DIET TYPE
renal replacement pts:no protein restr,no conc K & phos, low sodium/DASH/TLC (sodium and cholesterol restricted diet)

## 2017-06-02 NOTE — DISCHARGE NOTE ADULT - PATIENT PORTAL LINK FT
“You can access the Follow[facility] Patient Portal, offered by [facility], by registering with the following website: [email or web address]”

## 2017-06-02 NOTE — DISCHARGE NOTE ADULT - CARE PLAN
Principal Discharge DX:	Metabolic disorder  Goal:	Follow up your primary care providers.  Instructions for follow-up, activity and diet:	You came in because of weakness. You were found to have severe electrolyte abnormalities such as low calcium, low magnesium, low potassium, and high phosphorus. We treated you with electrolytes supplementation and we gave you a phosphate binder which helped reduce the level. You were seen by a nephrologist who made recommendations and recommended to continue taking the medications listed in this paperwork.  Secondary Diagnosis:	Chronic kidney disease  Instructions for follow-up, activity and diet:	Continue to take the medications listed in this paperwork.  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  Continue to take the medications listed in this paperwork.  Secondary Diagnosis:	Essential hypertension  Instructions for follow-up, activity and diet:	Continue to take the medications listed in this paperwork.  Secondary Diagnosis:	Anemia, iron deficiency  Instructions for follow-up, activity and diet:	Your blood count was NOT severely low however you had some chest discomfort initially and the medical staff felt that transfusing one unit of blood would help your chest discomfort as low blood counts can sometimes cause chest pain in patients with heart disease. Your chest pain went away. A cardiologist saw you and does not feel that you had an acute cardiac even.t   Continue to take the medications listed in this paperwork.  Secondary Diagnosis:	Hypocalcemia  Instructions for follow-up, activity and diet:	As mentioned above.  Continue to take the medications listed in this paperwork.  Secondary Diagnosis:	Hypomagnesemia  Instructions for follow-up, activity and diet:	As mentioned above.  Continue to take the medications listed in this paperwork. Principal Discharge DX:	Metabolic disorder  Goal:	Follow up your primary care providers.  Instructions for follow-up, activity and diet:	You came in because of weakness. You were found to have severe electrolyte abnormalities such as low calcium, low magnesium, low potassium, and high phosphorus. We treated you with electrolytes supplementation and we gave you a phosphate binder which helped reduce the level. You were seen by a nephrologist who made recommendations and recommended to continue taking the medications listed in this paperwork.    DISCHARGE INSTRUCTIONS  1. Follow up with Dr. Hoyt as soon as possible.  2. After you see Dr. Hoyt, please have him give you a referral to see Dr. Bryan around 2 pm(Nephrology)  Secondary Diagnosis:	Chronic kidney disease  Instructions for follow-up, activity and diet:	Continue to take the medications listed in this paperwork.  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  Continue to take the medications listed in this paperwork.  Secondary Diagnosis:	Essential hypertension  Instructions for follow-up, activity and diet:	Continue to take the medications listed in this paperwork.  Secondary Diagnosis:	Anemia, iron deficiency  Instructions for follow-up, activity and diet:	Your blood count was NOT severely low however you had some chest discomfort initially and the medical staff felt that transfusing one unit of blood would help your chest discomfort as low blood counts can sometimes cause chest pain in patients with heart disease. Your chest pain went away. A cardiologist saw you and does not feel that you had an acute cardiac even.t   Continue to take the medications listed in this paperwork.  Secondary Diagnosis:	Hypocalcemia  Instructions for follow-up, activity and diet:	As mentioned above.  Continue to take the medications listed in this paperwork.  Secondary Diagnosis:	Hypomagnesemia  Instructions for follow-up, activity and diet:	As mentioned above.  Continue to take the medications listed in this paperwork. Principal Discharge DX:	Metabolic disorder  Goal:	Follow up your primary care providers.  Instructions for follow-up, activity and diet:	You came in because of weakness. You were found to have severe electrolyte abnormalities such as low calcium, low magnesium, low potassium, and high phosphorus. We treated you with electrolytes supplementation and we gave you a phosphate binder which helped reduce the level. You were seen by a nephrologist who made recommendations and recommended to continue taking the medications listed in this paperwork.    DISCHARGE INSTRUCTIONS  1. Follow up with Dr. Hoyt as soon as possible.  2. After you see Dr. Hoyt, please have him give you a referral to see Dr. Bryan around 2 pm(Nephrology)  Secondary Diagnosis:	Chronic kidney disease  Instructions for follow-up, activity and diet:	Continue to take the medications listed in this paperwork.  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  Continue to take the medications listed in this paperwork.  Secondary Diagnosis:	Essential hypertension  Instructions for follow-up, activity and diet:	Continue to take the medications listed in this paperwork.  Secondary Diagnosis:	Anemia, iron deficiency  Instructions for follow-up, activity and diet:	Your blood count was NOT severely low however you had some chest discomfort initially and the medical staff felt that transfusing one unit of blood would help your chest discomfort as low blood counts can sometimes cause chest pain in patients with heart disease. Your chest pain went away. A cardiologist saw you and does not feel that you had an acute cardiac event.  Continue to take the medications listed in this paperwork.  Secondary Diagnosis:	Hypocalcemia  Instructions for follow-up, activity and diet:	As mentioned above.  Continue to take the medications listed in this paperwork.  Secondary Diagnosis:	Hypomagnesemia  Instructions for follow-up, activity and diet:	As mentioned above.  Continue to take the medications listed in this paperwork.

## 2017-06-02 NOTE — DISCHARGE NOTE ADULT - ADDITIONAL INSTRUCTIONS
Follow up with the physicians as above.  If you develop recurrence of your symptoms, chest pain, shortness of breath, palpitations, nausea, vomiting, diarrhea, blood stool,  blood cough, blood vomit, fever, or chills please go to the emergency department. If you notice any acute weakness, facial droop, vision changes, or headache, please go to the emergency department.

## 2017-06-02 NOTE — PROGRESS NOTE ADULT - SUBJECTIVE AND OBJECTIVE BOX
CC: WEAKNESS      INTERVAL HISTORY:84 yo lady with ht disease, crf, anemia, generalized weakness, other comorbidities as outlined.  has improved and is being readied for transfer to [facility].    no acuate symptoms.       ROS: No chest pain. No shortness of breath. No nausea.    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  ENT: oral mucosa moist, no pallor/cyanosis.  Neck: no JVD visible.  Cardiac: no rubs. no murmurs. Extremities: NO EDEMA.  Skin: no rashes.  Extremities (digits): no clubbing or cyanosis.  Respratory effort: no access muscle use. Lungs: CLEAR TO AUSCULTATION.  Abdomen: soft. nontender. no masses.  Psych affect: not depressed. Orientation: person, place, situation.     MEDICATIONS  (STANDING):  carvedilol 6.25milliGRAM(s) Oral every 12 hours  hydrALAZINE 25milliGRAM(s) Oral three times a day  tamsulosin 0.4milliGRAM(s) Oral at bedtime  predniSONE   Tablet 5milliGRAM(s) Oral daily  heparin  Injectable 5000Unit(s) SubCutaneous every 12 hours  atorvastatin 40milliGRAM(s) Oral at bedtime  calcitriol   Capsule 0.5MICROGram(s) Oral daily  epoetin suhas Injectable 46535Kzih(s) SubCutaneous every 7 days  calcium acetate 1334milliGRAM(s) Oral three times a day with meals  ferrous    sulfate 325milliGRAM(s) Oral daily  ascorbic acid 500milliGRAM(s) Oral daily  polyethylene glycol 3350 17Gram(s) Oral daily  calcium carbonate 1250 mG (OsCal) 1Tablet(s) Oral every 6 hours    MEDICATIONS  (PRN):      DATA:  142    |  105    |  77<H>  ----------------------------<  100<H>  Ca:6.4<LL> (02 Jun 2017 06:05)  3.8     |  17<L>  |  3.70<H>      eGFR if Non : 11 <L>  eGFR if : 12 <L>    TPro  5.3 g/dL<L>  /  Alb  x      /  TBili  x      /  DBili  x      /  AST  x      /  ALT  x      /  AlkPhos  x      01 Jun 2017 15:41                        7.2<L>  6.1   )-----------( 167      ( 02 Jun 2017 06:05 )             21.8<L>    Ferritin, Serum: 1231.0 ng/mL <H> (05-31 @ 03:42)      Urinalysis Basic - ( 01 Jun 2017 14:08 )  Color: x / Appearance: x / SG: x / pH: x  Gluc: x / Ketone: x  / Bili: x / Urobili: x   Blood: x / Protein: x / Nitrite: x   Leuk Esterase: x / RBC: < 5 /HPF / WBC Many /HPF<!!>   Sq Epi: x / Non Sq Epi: Rare /HPF / Bacteria: Many /HPF<!!>

## 2017-06-02 NOTE — DISCHARGE NOTE ADULT - CARE PROVIDERS DIRECT ADDRESSES
,DirectAddress_Unknown ,DirectAddress_Unknown,[email or web address]

## 2017-06-02 NOTE — PROGRESS NOTE ADULT - ASSESSMENT
Pt with esrd, cad, anemia, fatigue, hemodynamically stable, and with volume status fairly acceptable, tho has bronchovesicular sounds at left base.   have reviewed meds and labs and discussed with staff.  Her hgb of 7.2 is of concern, and though her feritin is high, her markedly low iron sat would predict a poor response to procrit,  and PO iron may take a long interval to impact her fe sat.   once discharged would consider IV feraheme, which can be given in infusion center over 1 hr each time, which would rapidly replete iron stores and potentiate the response to DANIELE.

## 2017-06-03 LAB
KAPPA LC SER QL IFE: 7.05 MG/DL — HIGH (ref 0.33–1.94)
KAPPA/LAMBDA FREE LIGHT CHAIN RATIO, SERUM: 1.71 RATIO — HIGH (ref 0.26–1.65)
LAMBDA LC SER QL IFE: 4.13 MG/DL — HIGH (ref 0.57–2.63)

## 2017-06-05 DIAGNOSIS — Z91.013 ALLERGY TO SEAFOOD: ICD-10-CM

## 2017-06-05 DIAGNOSIS — E83.39 OTHER DISORDERS OF PHOSPHORUS METABOLISM: ICD-10-CM

## 2017-06-05 DIAGNOSIS — I24.8 OTHER FORMS OF ACUTE ISCHEMIC HEART DISEASE: ICD-10-CM

## 2017-06-05 DIAGNOSIS — Z91.018 ALLERGY TO OTHER FOODS: ICD-10-CM

## 2017-06-05 DIAGNOSIS — I31.3 PERICARDIAL EFFUSION (NONINFLAMMATORY): ICD-10-CM

## 2017-06-05 DIAGNOSIS — E83.42 HYPOMAGNESEMIA: ICD-10-CM

## 2017-06-05 DIAGNOSIS — R80.9 PROTEINURIA, UNSPECIFIED: ICD-10-CM

## 2017-06-05 DIAGNOSIS — D63.8 ANEMIA IN OTHER CHRONIC DISEASES CLASSIFIED ELSEWHERE: ICD-10-CM

## 2017-06-05 DIAGNOSIS — Z91.011 ALLERGY TO MILK PRODUCTS: ICD-10-CM

## 2017-06-05 DIAGNOSIS — Z88.0 ALLERGY STATUS TO PENICILLIN: ICD-10-CM

## 2017-06-05 DIAGNOSIS — M31.6 OTHER GIANT CELL ARTERITIS: ICD-10-CM

## 2017-06-05 DIAGNOSIS — K21.9 GASTRO-ESOPHAGEAL REFLUX DISEASE WITHOUT ESOPHAGITIS: ICD-10-CM

## 2017-06-05 DIAGNOSIS — Z66 DO NOT RESUSCITATE: ICD-10-CM

## 2017-06-05 DIAGNOSIS — I25.10 ATHEROSCLEROTIC HEART DISEASE OF NATIVE CORONARY ARTERY WITHOUT ANGINA PECTORIS: ICD-10-CM

## 2017-06-05 DIAGNOSIS — I24.9 ACUTE ISCHEMIC HEART DISEASE, UNSPECIFIED: ICD-10-CM

## 2017-06-05 DIAGNOSIS — I50.22 CHRONIC SYSTOLIC (CONGESTIVE) HEART FAILURE: ICD-10-CM

## 2017-06-05 DIAGNOSIS — J45.909 UNSPECIFIED ASTHMA, UNCOMPLICATED: ICD-10-CM

## 2017-06-05 DIAGNOSIS — Z88.8 ALLERGY STATUS TO OTHER DRUGS, MEDICAMENTS AND BIOLOGICAL SUBSTANCES STATUS: ICD-10-CM

## 2017-06-05 DIAGNOSIS — N17.0 ACUTE KIDNEY FAILURE WITH TUBULAR NECROSIS: ICD-10-CM

## 2017-06-05 DIAGNOSIS — M06.9 RHEUMATOID ARTHRITIS, UNSPECIFIED: ICD-10-CM

## 2017-06-05 DIAGNOSIS — N18.6 END STAGE RENAL DISEASE: ICD-10-CM

## 2017-06-05 DIAGNOSIS — I34.0 NONRHEUMATIC MITRAL (VALVE) INSUFFICIENCY: ICD-10-CM

## 2017-06-05 DIAGNOSIS — D50.9 IRON DEFICIENCY ANEMIA, UNSPECIFIED: ICD-10-CM

## 2017-06-05 DIAGNOSIS — E11.65 TYPE 2 DIABETES MELLITUS WITH HYPERGLYCEMIA: ICD-10-CM

## 2017-06-05 DIAGNOSIS — I13.0 HYPERTENSIVE HEART AND CHRONIC KIDNEY DISEASE WITH HEART FAILURE AND STAGE 1 THROUGH STAGE 4 CHRONIC KIDNEY DISEASE, OR UNSPECIFIED CHRONIC KIDNEY DISEASE: ICD-10-CM

## 2017-06-05 DIAGNOSIS — J44.9 CHRONIC OBSTRUCTIVE PULMONARY DISEASE, UNSPECIFIED: ICD-10-CM

## 2017-06-05 DIAGNOSIS — I25.2 OLD MYOCARDIAL INFARCTION: ICD-10-CM

## 2017-06-05 DIAGNOSIS — I35.0 NONRHEUMATIC AORTIC (VALVE) STENOSIS: ICD-10-CM

## 2017-06-05 DIAGNOSIS — E83.51 HYPOCALCEMIA: ICD-10-CM

## 2017-06-05 DIAGNOSIS — Z79.52 LONG TERM (CURRENT) USE OF SYSTEMIC STEROIDS: ICD-10-CM

## 2017-06-05 DIAGNOSIS — Z79.51 LONG TERM (CURRENT) USE OF INHALED STEROIDS: ICD-10-CM

## 2017-06-05 DIAGNOSIS — E87.2 ACIDOSIS: ICD-10-CM

## 2017-06-05 DIAGNOSIS — Z90.49 ACQUIRED ABSENCE OF OTHER SPECIFIED PARTS OF DIGESTIVE TRACT: ICD-10-CM

## 2017-06-05 DIAGNOSIS — Z88.6 ALLERGY STATUS TO ANALGESIC AGENT: ICD-10-CM

## 2017-06-05 LAB
% ALBUMIN: 43.5 % — SIGNIFICANT CHANGE UP
% ALPHA 1: 10 % — SIGNIFICANT CHANGE UP
% ALPHA 2: 24.9 % — SIGNIFICANT CHANGE UP
% BETA: 11.3 % — SIGNIFICANT CHANGE UP
% GAMMA: 10.3 % — SIGNIFICANT CHANGE UP
ALBUMIN SERPL ELPH-MCNC: 2.3 G/DL — LOW (ref 3.6–5.5)
ALBUMIN/GLOB SERPL ELPH: 0.8 RATIO — SIGNIFICANT CHANGE UP
ALPHA1 GLOB SERPL ELPH-MCNC: 0.5 G/DL — HIGH (ref 0.1–0.4)
ALPHA2 GLOB SERPL ELPH-MCNC: 1.3 G/DL — HIGH (ref 0.5–1)
B-GLOBULIN SERPL ELPH-MCNC: 0.6 G/DL — SIGNIFICANT CHANGE UP (ref 0.5–1)
GAMMA GLOBULIN: 0.5 G/DL — LOW (ref 0.6–1.6)
INTERPRETATION SERPL IFE-IMP: SIGNIFICANT CHANGE UP
PROT PATTERN SERPL ELPH-IMP: SIGNIFICANT CHANGE UP
PROT PATTERN SERPL ELPH-IMP: SIGNIFICANT CHANGE UP

## 2017-07-17 ENCOUNTER — LABORATORY RESULT (OUTPATIENT)
Age: 82
End: 2017-07-17

## 2017-07-17 ENCOUNTER — APPOINTMENT (OUTPATIENT)
Dept: NEPHROLOGY | Facility: CLINIC | Age: 82
End: 2017-07-17

## 2017-07-17 VITALS — BODY MASS INDEX: 21.03 KG/M2 | WEIGHT: 115 LBS

## 2017-07-17 VITALS — SYSTOLIC BLOOD PRESSURE: 150 MMHG | DIASTOLIC BLOOD PRESSURE: 65 MMHG | HEART RATE: 84 BPM

## 2017-07-17 VITALS — DIASTOLIC BLOOD PRESSURE: 72 MMHG | SYSTOLIC BLOOD PRESSURE: 136 MMHG

## 2017-07-17 RX ORDER — HYDROCODONE BITARTRATE AND ACETAMINOPHEN 5; 325 MG/1; MG/1
5-325 TABLET ORAL
Qty: 30 | Refills: 0 | Status: DISCONTINUED | COMMUNITY
Start: 2017-05-12

## 2017-07-17 RX ORDER — FOLIC ACID 1 MG/1
1 TABLET ORAL
Qty: 30 | Refills: 0 | Status: DISCONTINUED | COMMUNITY
Start: 2017-01-27

## 2017-07-17 RX ORDER — ONDANSETRON 4 MG/1
4 TABLET ORAL
Qty: 40 | Refills: 0 | Status: DISCONTINUED | COMMUNITY
Start: 2017-06-19

## 2017-07-17 RX ORDER — ESCITALOPRAM OXALATE 5 MG/1
5 TABLET ORAL
Qty: 30 | Refills: 0 | Status: DISCONTINUED | COMMUNITY
Start: 2017-02-02

## 2017-07-17 RX ORDER — PREDNISONE 10 MG/1
10 TABLET ORAL
Qty: 30 | Refills: 0 | Status: DISCONTINUED | COMMUNITY
Start: 2017-04-20

## 2017-07-17 RX ORDER — PANTOPRAZOLE 40 MG/1
40 TABLET, DELAYED RELEASE ORAL
Qty: 30 | Refills: 0 | Status: DISCONTINUED | COMMUNITY
Start: 2017-04-20

## 2017-07-17 RX ORDER — HYDRALAZINE HYDROCHLORIDE 50 MG/1
50 TABLET ORAL
Qty: 90 | Refills: 0 | Status: DISCONTINUED | COMMUNITY
Start: 2016-07-07

## 2017-07-17 RX ORDER — LORATADINE 10 MG/1
10 TABLET ORAL
Qty: 30 | Refills: 0 | Status: DISCONTINUED | COMMUNITY
Start: 2017-04-20

## 2017-07-17 RX ORDER — TAMSULOSIN HYDROCHLORIDE 0.4 MG/1
0.4 CAPSULE ORAL
Qty: 30 | Refills: 0 | Status: DISCONTINUED | COMMUNITY
Start: 2017-04-20

## 2017-07-17 RX ORDER — CIPROFLOXACIN HYDROCHLORIDE 500 MG/1
500 TABLET, FILM COATED ORAL
Qty: 14 | Refills: 0 | Status: DISCONTINUED | COMMUNITY
Start: 2017-05-02

## 2017-07-17 RX ORDER — CALCIUM ACETATE 667 MG/1
667 CAPSULE ORAL
Qty: 90 | Refills: 0 | Status: DISCONTINUED | COMMUNITY
Start: 2017-06-02

## 2017-07-18 ENCOUNTER — TRANSCRIPTION ENCOUNTER (OUTPATIENT)
Age: 82
End: 2017-07-18

## 2017-07-18 ENCOUNTER — INPATIENT (INPATIENT)
Facility: HOSPITAL | Age: 82
LOS: 2 days | Discharge: HOME HEALTH | DRG: 641 | End: 2017-07-21
Attending: HOSPITALIST | Admitting: HOSPITALIST
Payer: COMMERCIAL

## 2017-07-18 VITALS
DIASTOLIC BLOOD PRESSURE: 83 MMHG | OXYGEN SATURATION: 99 % | RESPIRATION RATE: 16 BRPM | HEART RATE: 78 BPM | TEMPERATURE: 97 F | SYSTOLIC BLOOD PRESSURE: 179 MMHG

## 2017-07-18 DIAGNOSIS — I10 ESSENTIAL (PRIMARY) HYPERTENSION: ICD-10-CM

## 2017-07-18 DIAGNOSIS — N18.9 CHRONIC KIDNEY DISEASE, UNSPECIFIED: ICD-10-CM

## 2017-07-18 DIAGNOSIS — Z90.710 ACQUIRED ABSENCE OF BOTH CERVIX AND UTERUS: Chronic | ICD-10-CM

## 2017-07-18 DIAGNOSIS — Z90.49 ACQUIRED ABSENCE OF OTHER SPECIFIED PARTS OF DIGESTIVE TRACT: Chronic | ICD-10-CM

## 2017-07-18 DIAGNOSIS — D64.9 ANEMIA, UNSPECIFIED: ICD-10-CM

## 2017-07-18 DIAGNOSIS — I35.0 NONRHEUMATIC AORTIC (VALVE) STENOSIS: ICD-10-CM

## 2017-07-18 DIAGNOSIS — Z29.9 ENCOUNTER FOR PROPHYLACTIC MEASURES, UNSPECIFIED: ICD-10-CM

## 2017-07-18 DIAGNOSIS — K56.69 OTHER INTESTINAL OBSTRUCTION: ICD-10-CM

## 2017-07-18 DIAGNOSIS — E87.8 OTHER DISORDERS OF ELECTROLYTE AND FLUID BALANCE, NOT ELSEWHERE CLASSIFIED: ICD-10-CM

## 2017-07-18 DIAGNOSIS — R21 RASH AND OTHER NONSPECIFIC SKIN ERUPTION: ICD-10-CM

## 2017-07-18 DIAGNOSIS — Z92.89 PERSONAL HISTORY OF OTHER MEDICAL TREATMENT: Chronic | ICD-10-CM

## 2017-07-18 DIAGNOSIS — R63.8 OTHER SYMPTOMS AND SIGNS CONCERNING FOOD AND FLUID INTAKE: ICD-10-CM

## 2017-07-18 LAB
ALBUMIN SERPL ELPH-MCNC: 2.8 G/DL — LOW (ref 3.3–5)
ALBUMIN SERPL ELPH-MCNC: 3.4 G/DL
ALP BLD-CCNC: 53 U/L
ALP SERPL-CCNC: 48 U/L — SIGNIFICANT CHANGE UP (ref 40–120)
ALT FLD-CCNC: <5 U/L — LOW (ref 10–45)
ALT SERPL-CCNC: 9 U/L
ANION GAP SERPL CALC-SCNC: 18 MMOL/L — HIGH (ref 5–17)
ANION GAP SERPL CALC-SCNC: 19 MMOL/L
ANION GAP SERPL CALC-SCNC: 19 MMOL/L — HIGH (ref 5–17)
ANION GAP SERPL CALC-SCNC: 20 MMOL/L — HIGH (ref 5–17)
ANISOCYTOSIS BLD QL: SLIGHT — SIGNIFICANT CHANGE UP
APPEARANCE UR: CLEAR — SIGNIFICANT CHANGE UP
AST SERPL-CCNC: 12 U/L — SIGNIFICANT CHANGE UP (ref 10–40)
AST SERPL-CCNC: 17 U/L
BASOPHILS NFR BLD AUTO: 0.2 % — SIGNIFICANT CHANGE UP (ref 0–2)
BILIRUB SERPL-MCNC: 0.4 MG/DL
BILIRUB SERPL-MCNC: 0.4 MG/DL — SIGNIFICANT CHANGE UP (ref 0.2–1.2)
BILIRUB UR-MCNC: NEGATIVE — SIGNIFICANT CHANGE UP
BUN SERPL-MCNC: 46 MG/DL — HIGH (ref 7–23)
BUN SERPL-MCNC: 46 MG/DL — HIGH (ref 7–23)
BUN SERPL-MCNC: 49 MG/DL
BUN SERPL-MCNC: 50 MG/DL — HIGH (ref 7–23)
CALCIUM SERPL-MCNC: 5 MG/DL — CRITICAL LOW (ref 8.4–10.5)
CALCIUM SERPL-MCNC: 5.3 MG/DL
CALCIUM SERPL-MCNC: 5.3 MG/DL
CALCIUM SERPL-MCNC: 5.4 MG/DL — CRITICAL LOW (ref 8.4–10.5)
CHLORIDE SERPL-SCNC: 107 MMOL/L — SIGNIFICANT CHANGE UP (ref 96–108)
CHLORIDE SERPL-SCNC: 108 MMOL/L — SIGNIFICANT CHANGE UP (ref 96–108)
CHLORIDE SERPL-SCNC: 110 MMOL/L — HIGH (ref 96–108)
CHLORIDE SERPL-SCNC: 111 MMOL/L
CO2 SERPL-SCNC: 12 MMOL/L — LOW (ref 22–31)
CO2 SERPL-SCNC: 13 MMOL/L
CO2 SERPL-SCNC: 13 MMOL/L — LOW (ref 22–31)
CO2 SERPL-SCNC: 13 MMOL/L — LOW (ref 22–31)
COLOR SPEC: YELLOW — SIGNIFICANT CHANGE UP
CREAT SERPL-MCNC: 3.08 MG/DL
CREAT SERPL-MCNC: 3.1 MG/DL — HIGH (ref 0.5–1.3)
CREAT SERPL-MCNC: 3.2 MG/DL — HIGH (ref 0.5–1.3)
CREAT SERPL-MCNC: 3.2 MG/DL — HIGH (ref 0.5–1.3)
DIFF PNL FLD: NEGATIVE — SIGNIFICANT CHANGE UP
EOSINOPHIL NFR BLD AUTO: 0.9 % — SIGNIFICANT CHANGE UP (ref 0–6)
EXTRA BLUE TOP TUBE: SIGNIFICANT CHANGE UP
FERRITIN SERPL-MCNC: 697 NG/ML
FOLATE SERPL-MCNC: >20 NG/ML
GLUCOSE SERPL-MCNC: 71 MG/DL — SIGNIFICANT CHANGE UP (ref 70–99)
GLUCOSE SERPL-MCNC: 73 MG/DL
GLUCOSE SERPL-MCNC: 91 MG/DL — SIGNIFICANT CHANGE UP (ref 70–99)
GLUCOSE SERPL-MCNC: 98 MG/DL — SIGNIFICANT CHANGE UP (ref 70–99)
GLUCOSE UR QL: NEGATIVE — SIGNIFICANT CHANGE UP
HCT VFR BLD CALC: 25.3 % — LOW (ref 34.5–45)
HGB BLD-MCNC: 8.1 G/DL — LOW (ref 11.5–15.5)
HYPOCHROMIA BLD QL: SLIGHT — SIGNIFICANT CHANGE UP
IRON SATN MFR SERPL: 16 %
IRON SERPL-MCNC: 29 UG/DL
KETONES UR-MCNC: NEGATIVE — SIGNIFICANT CHANGE UP
LEUKOCYTE ESTERASE UR-ACNC: NEGATIVE — SIGNIFICANT CHANGE UP
LYMPHOCYTES # BLD AUTO: 11.6 % — LOW (ref 13–44)
MAGNESIUM SERPL-MCNC: 0.5 MG/DL
MAGNESIUM SERPL-MCNC: 1 MG/DL — CRITICAL LOW (ref 1.6–2.6)
MAGNESIUM SERPL-MCNC: 1.7 MG/DL — SIGNIFICANT CHANGE UP (ref 1.6–2.6)
MANUAL SMEAR VERIFICATION: SIGNIFICANT CHANGE UP
MCHC RBC-ENTMCNC: 24 PG — LOW (ref 27–34)
MCHC RBC-ENTMCNC: 32 G/DL — SIGNIFICANT CHANGE UP (ref 32–36)
MCV RBC AUTO: 74.9 FL — LOW (ref 80–100)
MONOCYTES NFR BLD AUTO: 7.7 % — SIGNIFICANT CHANGE UP (ref 2–14)
NEUTROPHILS NFR BLD AUTO: 79.6 % — HIGH (ref 43–77)
NITRITE UR-MCNC: NEGATIVE — SIGNIFICANT CHANGE UP
OVALOCYTES BLD QL SMEAR: SLIGHT — SIGNIFICANT CHANGE UP
PARATHYROID HORMONE INTACT: 93 PG/ML
PH UR: 8.5 — HIGH (ref 5–8)
PHOSPHATE SERPL-MCNC: 5.9 MG/DL
PHOSPHATE SERPL-MCNC: 6.5 MG/DL — HIGH (ref 2.5–4.5)
PLAT MORPH BLD: NORMAL — SIGNIFICANT CHANGE UP
PLATELET # BLD AUTO: 125 K/UL — LOW (ref 150–400)
POTASSIUM SERPL-MCNC: 4.4 MMOL/L — SIGNIFICANT CHANGE UP (ref 3.5–5.3)
POTASSIUM SERPL-MCNC: 4.5 MMOL/L — SIGNIFICANT CHANGE UP (ref 3.5–5.3)
POTASSIUM SERPL-MCNC: 4.9 MMOL/L — SIGNIFICANT CHANGE UP (ref 3.5–5.3)
POTASSIUM SERPL-SCNC: 4.4 MMOL/L
POTASSIUM SERPL-SCNC: 4.4 MMOL/L — SIGNIFICANT CHANGE UP (ref 3.5–5.3)
POTASSIUM SERPL-SCNC: 4.5 MMOL/L — SIGNIFICANT CHANGE UP (ref 3.5–5.3)
POTASSIUM SERPL-SCNC: 4.9 MMOL/L — SIGNIFICANT CHANGE UP (ref 3.5–5.3)
PROT SERPL-MCNC: 5.7 G/DL — LOW (ref 6–8.3)
PROT SERPL-MCNC: 6 G/DL
PROT UR-MCNC: NEGATIVE MG/DL — SIGNIFICANT CHANGE UP
RBC # BLD: 3.38 M/UL — LOW (ref 3.8–5.2)
RBC # FLD: 22.6 % — HIGH (ref 10.3–16.9)
RBC BLD AUTO: (no result)
SODIUM SERPL-SCNC: 140 MMOL/L — SIGNIFICANT CHANGE UP (ref 135–145)
SODIUM SERPL-SCNC: 143 MMOL/L
SP GR SPEC: 1.01 — SIGNIFICANT CHANGE UP (ref 1–1.03)
TIBC SERPL-MCNC: 178 UG/DL
UIBC SERPL-MCNC: 149 UG/DL
URATE SERPL-MCNC: 8.6 MG/DL
UROBILINOGEN FLD QL: 0.2 E.U./DL — SIGNIFICANT CHANGE UP
WBC # BLD: 6.4 K/UL — SIGNIFICANT CHANGE UP (ref 3.8–10.5)
WBC # FLD AUTO: 6.4 K/UL — SIGNIFICANT CHANGE UP (ref 3.8–10.5)

## 2017-07-18 PROCEDURE — 99285 EMERGENCY DEPT VISIT HI MDM: CPT | Mod: 25

## 2017-07-18 PROCEDURE — 99223 1ST HOSP IP/OBS HIGH 75: CPT

## 2017-07-18 PROCEDURE — 99223 1ST HOSP IP/OBS HIGH 75: CPT | Mod: GC

## 2017-07-18 PROCEDURE — 93010 ELECTROCARDIOGRAM REPORT: CPT

## 2017-07-18 RX ORDER — TAMSULOSIN HYDROCHLORIDE 0.4 MG/1
0.4 CAPSULE ORAL AT BEDTIME
Qty: 0 | Refills: 0 | Status: DISCONTINUED | OUTPATIENT
Start: 2017-07-18 | End: 2017-07-21

## 2017-07-18 RX ORDER — CALCIUM GLUCONATE 100 MG/ML
2 VIAL (ML) INTRAVENOUS ONCE
Qty: 2 | Refills: 0 | Status: COMPLETED | OUTPATIENT
Start: 2017-07-18 | End: 2017-07-18

## 2017-07-18 RX ORDER — PANTOPRAZOLE SODIUM 20 MG/1
40 TABLET, DELAYED RELEASE ORAL
Qty: 0 | Refills: 0 | Status: DISCONTINUED | OUTPATIENT
Start: 2017-07-18 | End: 2017-07-20

## 2017-07-18 RX ORDER — LORATADINE 10 MG/1
10 TABLET ORAL DAILY
Qty: 0 | Refills: 0 | Status: DISCONTINUED | OUTPATIENT
Start: 2017-07-18 | End: 2017-07-21

## 2017-07-18 RX ORDER — MAGNESIUM OXIDE 400 MG ORAL TABLET 241.3 MG
800 TABLET ORAL ONCE
Qty: 0 | Refills: 0 | Status: COMPLETED | OUTPATIENT
Start: 2017-07-18 | End: 2017-07-18

## 2017-07-18 RX ORDER — MAGNESIUM SULFATE 500 MG/ML
2 VIAL (ML) INJECTION ONCE
Qty: 0 | Refills: 0 | Status: COMPLETED | OUTPATIENT
Start: 2017-07-18 | End: 2017-07-18

## 2017-07-18 RX ORDER — CARVEDILOL PHOSPHATE 80 MG/1
6.25 CAPSULE, EXTENDED RELEASE ORAL EVERY 12 HOURS
Qty: 0 | Refills: 0 | Status: DISCONTINUED | OUTPATIENT
Start: 2017-07-18 | End: 2017-07-20

## 2017-07-18 RX ORDER — HYDRALAZINE HCL 50 MG
50 TABLET ORAL THREE TIMES A DAY
Qty: 0 | Refills: 0 | Status: DISCONTINUED | OUTPATIENT
Start: 2017-07-18 | End: 2017-07-19

## 2017-07-18 RX ORDER — CALCIUM ACETATE 667 MG
667 TABLET ORAL
Qty: 0 | Refills: 0 | Status: DISCONTINUED | OUTPATIENT
Start: 2017-07-18 | End: 2017-07-19

## 2017-07-18 RX ORDER — CALCIUM CARBONATE 500(1250)
1 TABLET ORAL EVERY 6 HOURS
Qty: 0 | Refills: 0 | Status: DISCONTINUED | OUTPATIENT
Start: 2017-07-18 | End: 2017-07-19

## 2017-07-18 RX ADMIN — MAGNESIUM OXIDE 400 MG ORAL TABLET 800 MILLIGRAM(S): 241.3 TABLET ORAL at 15:28

## 2017-07-18 RX ADMIN — Medication 200 GRAM(S): at 21:37

## 2017-07-18 RX ADMIN — Medication 200 GRAM(S): at 15:33

## 2017-07-18 RX ADMIN — Medication 50 GRAM(S): at 15:28

## 2017-07-18 RX ADMIN — Medication 2 TABLET(S): at 15:32

## 2017-07-18 RX ADMIN — Medication 50 GRAM(S): at 18:46

## 2017-07-18 NOTE — H&P ADULT - ASSESSMENT
85F with HTN, CAD, CKD 4, SBO s/p small bowel resection c/b chronic hypocalcemia, presenting with acute hypocalcemia and hypomagnesemia

## 2017-07-18 NOTE — H&P ADULT - PROBLEM SELECTOR PLAN 6
Patient with h/o SBO and recent weight loss because of the procedure. Daughter states that the patient has been putting on more weight as of late with stable diet, however she appears slightly dry on exam. Patient with h/o SBO and recent weight loss because of the procedure. Daughter states that the patient has been putting on more weight as of late with stable diet Seen and evaluated by Dr Jones on last admission. Noted to have mild aortic regurgitation with moderate AS with h/o CAD. No ASA 2/2 ASA allergy. No plavix 2/2 h/o anemia.  - monitor for signs of overload Patient legs erythematous since Saturday. No pain, itchiness, non-tender. Possible petechial rash secondary to thrombocytopenia.  -monitor for change in status

## 2017-07-18 NOTE — H&P ADULT - PROBLEM SELECTOR PLAN 2
Patient with Hb of 8.1. History of anemia, baseline 7-9, likely 2/2 CKD. As per past records has been considered to begin EPO therapy, however has not done so as yet. Patient was on iron, but stopped taking it recently due to constipatoin. Has required multiple transfusions in the past.   - Continue to monitor and transfuse for Hgb <8 Patient with Hb of 8.1. History of anemia, baseline 7-9, likely 2/2 CKD. As per past records has been considered to begin EPO therapy, however has not done so as yet. Patient was on iron, but stopped taking it recently due to constipation. Has required multiple transfusions in the past.   - Continue to monitor and transfuse for Hgb <8

## 2017-07-18 NOTE — CONSULT NOTE ADULT - SUBJECTIVE AND OBJECTIVE BOX
ICU Consult Note  HPI: 85F with HTN, HLD, CAD, CKD 4, SBO s/p resection in 10/2016, anemia, hypocalcemia, temporal arteritis recently admitted to [facility] about 1 month ago for abnormal lab values. As per patient, she is new to Dr Bryan    85y  Female  angiotensin converting enzyme inhibitors (Angioedema)  aspirin (Hives; Rash)  lactose (Diarrhea)  penicillin (Angioedema)  Seafood (Short breath)  shellfish (Short breath)  spinach (Unknown)    Patient is a 85y old  Female who presents with a chief complaint of   HPI:      REVIEW OF SYSTEMS  General: No fever, No chills, No recent weight loss  Skin/Breast:  Opthalmologic:   ENMT: Denies changes in hearing/ voice  Respiratory/Throat: No SOB, No wheezing, No Chest pain,   Cardiovascular: No palpitations or arrhythmias  Gastrointestinal: No abdominal pain, no dysphagia, no odynophagia, No N/V/D, no hematemesis, no hematochezia  Genitourinary: No dysuria, No hematuria, No CVA tenderness, No nocturia, No frequency/urgency/ hesitancy  Musculoskeletal: Denies muscle pain, back pain, knee pain  Neurological: No headache, Denies numbness/tingling/ weakness  Psychiatric: Denies psych history  Hematology/Lymphatics: No abnormal bleeding or bruising,	  Endocrine: Known history of DM, taking medications as prescribed, No heat/ cold intolerance      PAST MEDICAL & SURGICAL HISTORY:  Heart attack  Temporal arteritis  SBO (small bowel obstruction)  Essential hypertension: HTN (hypertension)  Gastroesophageal reflux disease: GERD (gastroesophageal reflux disease)  Asthma: Asthma  Chronic kidney disease: CKD (chronic kidney disease)  S/P hysterectomy: partial hysterectomy  History of bowel resection  History of appendectomy    FAMILY HISTORY:  No pertinent family history in first degree relatives    MEDICATIONS  (STANDING):    MEDICATIONS  (PRN):      Vital Signs Last 24 Hrs  T(C): 36.6 (18 Jul 2017 17:55), Max: 36.6 (18 Jul 2017 17:55)  T(F): 97.8 (18 Jul 2017 17:55), Max: 97.8 (18 Jul 2017 17:55)  HR: 75 (18 Jul 2017 17:55) (75 - 78)  BP: 174/70 (18 Jul 2017 17:55) (174/70 - 179/83)  BP(mean): --  RR: 16 (18 Jul 2017 17:55) (16 - 16)  SpO2: 98% (18 Jul 2017 17:55) (98% - 99%)    PHYSICAL EXAM:  Constitutional: NAD, well-groomed, well-developed  HEENT: PERRLA, EOMI, no drainage or redness  Neck: No bruits; no thyromegaly or nodules,  No JVD  Back: Normal spine flexure, No CVA tenderness, No deformity or limitation of movement  Respiratory: Breath Sounds equal & clear to percussion & auscultation, no accessory muscle use  Cardiovascular: Regular rate & rhythm, normal S1, S2; no murmurs, gallops or rubs; no S3, S4  Gastrointestinal: Soft, non-tender, non distended no hepatosplenomegaly, normal bowel sounds  Extremities: No peripheral edema, No cyanosis, clubbing   Vascular: Equal and normal pulses: 2+ peripheral pulses throughout  Neurological: GCS:    A&O x 3; no sensory, motor  deficits, normal reflexes  Psychiatric: Normal mood, normal affect  Musculoskeletal: No joint pain, swelling or deformity; no limitation of movement  Skin: No rashes    LABS:                        8.1    6.4   )-----------( 125      ( 18 Jul 2017 12:47 )             25.3     CBC Full  -  ( 18 Jul 2017 12:47 )  WBC Count : 6.4 K/uL  Hemoglobin : 8.1 g/dL  Hematocrit : 25.3 %  Platelet Count - Automated : 125 K/uL  Mean Cell Volume : 74.9 fL  Mean Cell Hemoglobin : 24.0 pg  Mean Cell Hemoglobin Concentration : 32.0 g/dL  Auto Neutrophil # : x  Auto Lymphocyte # : x  Auto Monocyte # : x  Auto Eosinophil # : x  Auto Basophil # : x  Auto Neutrophil % : 79.6 %  Auto Lymphocyte % : 11.6 %  Auto Monocyte % : 7.7 %  Auto Eosinophil % : 0.9 %  Auto Basophil % : 0.2 %    07-18    140  |  110<H>  |  50<H>  ----------------------------<  71  4.5   |  12<L>  |  3.10<H>    Ca    5.0<LL>      18 Jul 2017 13:40  Phos  6.6     07-18  Mg     .5     07-18    TPro  5.7<L>  /  Alb  2.8<L>  /  TBili  0.4  /  DBili  x   /  AST  12  /  ALT  <5<L>  /  AlkPhos  48  07-18    PT/INR - ( 18 Jul 2017 12:47 )   PT: 14.6 sec;   INR: 1.31          PTT - ( 18 Jul 2017 12:47 )  PTT:38.4 sec ICU Consult Note  HPI: 85F with HTN, HLD, CAD, CKD 4, SBO s/p resection in 10/2016, anemia, hypocalcemia, temporal arteritis recently admitted to [facility] about 1 month ago for abnormal lab values. As per patient, she is new to Dr Bryan whom she saw for the first time yesterday and had routine lab work drawn. Lab work returned with abnormal lab values reported as Ca 5.5 and Mag <0.5 and was told to present to the ED for admission. In the ED lab work was repeated, patient given Calcium Carbonate 1250 mg + Vitamin D, Calcium Gluconate 2g IVPB, Magnesium Ox 800 x1 and Magnesium Sulfate 2g IVPB. ICU consulted for close monitoring in setting of electrolyte abnormalities.      REVIEW OF SYSTEMS  General: No fever, No chills, No recent weight loss  Skin/Breast: New rash on bilateral anterior surface of lower legs  ENMT: Denies changes in hearing/ voice  Respiratory/Throat: No SOB, No wheezing, No Chest pain   Cardiovascular: No palpitations or arrhythmias  Gastrointestinal: No abdominal pain, no dysphagia, no odynophagia, No N/V/D, no hematemesis, no hematochezia  Genitourinary: No dysuria, No hematuria, No CVA tenderness, No nocturia, No frequency/urgency/ hesitancy  Musculoskeletal: Denies muscle pain, back pain, knee pain  Neurological: No headache, Denies numbness/tingling/ weakness  Psychiatric: Denies psych history  Hematology/Lymphatics: No abnormal bleeding or bruising,	      ALLERGIES:  angiotensin converting enzyme inhibitors (Angioedema)  aspirin (Hives; Rash)  lactose (Diarrhea)  penicillin (Angioedema)  Seafood (Short breath)  shellfish (Short breath)  spinach (Unknown)      PAST MEDICAL & SURGICAL HISTORY:  Heart attack  Temporal arteritis  SBO (small bowel obstruction)  Essential hypertension: HTN (hypertension)  Gastroesophageal reflux disease: GERD (gastroesophageal reflux disease)  Asthma: Asthma  Chronic kidney disease: CKD (chronic kidney disease)  S/P hysterectomy: partial hysterectomy  History of bowel resection  History of appendectomy      FAMILY HISTORY:  No pertinent family history in first degree relatives    Vital Signs Last 24 Hrs  T(C): 36.6 (18 Jul 2017 17:55), Max: 36.6 (18 Jul 2017 17:55)  T(F): 97.8 (18 Jul 2017 17:55), Max: 97.8 (18 Jul 2017 17:55)  HR: 75 (18 Jul 2017 17:55) (75 - 78)  BP: 174/70 (18 Jul 2017 17:55) (174/70 - 179/83)  RR: 16 (18 Jul 2017 17:55) (16 - 16)  SpO2: 98% (18 Jul 2017 17:55) (98% - 99%)      PHYSICAL EXAM:  Constitutional: NAD, well-groomed, well-developed  HEENT: PERRLA, EOMI, no drainage or redness, L lateral side of tongue is with 1cm x1cm erythematous raised portion of tongue, non tender, unknown duration  Neck: No bruits; no thyromegaly or nodules, mild JVD noted on exam  Back: Normal spine flexure, No CVA tenderness, No deformity or limitation of movement  Respiratory: Breath Sounds equal & clear to percussion & auscultation, no accessory muscle use  Cardiovascular: Regular rate & rhythm, normal S1, S2; 5/6 systolic murmur with bilateral radiation to the carotid arteries, no gallops or rubs; no S3, S4  Gastrointestinal: Soft, non-tender, non distended no hepatosplenomegaly, normal bowel sounds  Extremities: bilateral peripheral edema, No cyanosis, clubbing   Vascular: Equal and normal pulses: 2+ peripheral pulses throughout  Neurological: A&O x 3; no sensory, motor  deficits, normal reflexes  Psychiatric: Normal mood, normal affect  Musculoskeletal: No joint pain, swelling or deformity; no limitation of movement  Skin: No rashes    LABS:             8.1    6.4   )-----------( 125      ( 18 Jul 2017 12:47 )             25.3   Mean Cell Volume : 74.9 fL  Mean Cell Hemoglobin : 24.0 pg  Mean Cell Hemoglobin Concentration : 32.0 g/dL  Auto Neutrophil % : 79.6 %  Auto Lymphocyte % : 11.6 %  Auto Monocyte % : 7.7 %  Auto Eosinophil % : 0.9 %  Auto Basophil % : 0.2 %      140  |  110<H>  |  50<H>  ----------------------------<  71  4.5   |  12<L>  |  3.10<H>    Ca    5.0<LL>   Phos  6.6     Mg     .5        TPro  5.7<L>  /  Alb  2.8<L>  /  TBili  0.4  /  DBili  x   /  AST  12  /  ALT  <5<L>  /  AlkPhos  48  07-18    PT/INR - ( 18 Jul 2017 12:47 )   PT: 14.6 sec;   INR: 1.31       PTT - ( 18 Jul 2017 12:47 )  PTT:38.4 sec      A/P: 85F with HTN, CAD, CKD 4, SBO s/p small bowel resection c/b chronic hypocalcemia, presenting with acute hypocalcemia and hypomagnesemia    # Abnormal Lab Values  - Patient found to have Ca 5.0 and Mg 0.5 although on supplementation at home. It is likely that patient has chronic abnormalities 2/2 decreased absorption ICU Consult Note  HPI: 85F with HTN, HLD, CAD, CKD 4, SBO s/p resection in 10/2016, anemia, hypocalcemia, temporal arteritis recently admitted to [facility] about 1 month ago for abnormal lab values. As per patient, she is new to Dr Bryan whom she saw for the first time yesterday and had routine lab work drawn. Lab work returned with abnormal lab values reported as Ca 5.5 and Mag <0.5 and was told to present to the ED for admission. In the ED lab work was repeated, patient given Calcium Carbonate 1250 mg + Vitamin D, Calcium Gluconate 2g IVPB, Magnesium Ox 800 x1 and Magnesium Sulfate 2g IVPB. ICU consulted for close monitoring in setting of electrolyte abnormalities.      REVIEW OF SYSTEMS  General: No fever, No chills, No recent weight loss  Skin/Breast: New rash on bilateral anterior surface of lower legs  ENMT: Denies changes in hearing/ voice  Respiratory/Throat: No SOB, No wheezing, No Chest pain   Cardiovascular: No palpitations or arrhythmias  Gastrointestinal: No abdominal pain, no dysphagia, no odynophagia, No N/V/D, no hematemesis, no hematochezia  Genitourinary: No dysuria, No hematuria, No CVA tenderness, No nocturia, No frequency/urgency/ hesitancy  Musculoskeletal: Denies muscle pain, back pain, knee pain  Neurological: No headache, Denies numbness/tingling/ weakness  Psychiatric: Denies psych history  Hematology/Lymphatics: No abnormal bleeding or bruising,	      ALLERGIES:  angiotensin converting enzyme inhibitors (Angioedema)  aspirin (Hives; Rash)  lactose (Diarrhea)  penicillin (Angioedema)  Seafood (Short breath)  shellfish (Short breath)  spinach (Unknown)      PAST MEDICAL & SURGICAL HISTORY:  Heart attack  Temporal arteritis  SBO (small bowel obstruction)  Essential hypertension: HTN (hypertension)  Gastroesophageal reflux disease: GERD (gastroesophageal reflux disease)  Asthma: Asthma  Chronic kidney disease: CKD (chronic kidney disease)  S/P hysterectomy: partial hysterectomy  History of bowel resection  History of appendectomy      FAMILY HISTORY:  No pertinent family history in first degree relatives    Vital Signs Last 24 Hrs  T(C): 36.6 (18 Jul 2017 17:55), Max: 36.6 (18 Jul 2017 17:55)  T(F): 97.8 (18 Jul 2017 17:55), Max: 97.8 (18 Jul 2017 17:55)  HR: 75 (18 Jul 2017 17:55) (75 - 78)  BP: 174/70 (18 Jul 2017 17:55) (174/70 - 179/83)  RR: 16 (18 Jul 2017 17:55) (16 - 16)  SpO2: 98% (18 Jul 2017 17:55) (98% - 99%)      PHYSICAL EXAM:  Constitutional: NAD, well-groomed, well-developed  HEENT: PERRLA, EOMI, no drainage or redness, L lateral side of tongue is with 1cm x1cm erythematous raised portion of tongue, non tender, unknown duration  Neck: No bruits; no thyromegaly or nodules, mild JVD noted on exam  Back: Normal spine flexure, No CVA tenderness, No deformity or limitation of movement  Respiratory: Breath Sounds equal & clear to percussion & auscultation, no accessory muscle use  Cardiovascular: Regular rate & rhythm, normal S1, S2; 5/6 systolic murmur with bilateral radiation to the carotid arteries, no gallops or rubs; no S3, S4  Gastrointestinal: Soft, non-tender, non distended no hepatosplenomegaly, normal bowel sounds  Extremities: bilateral peripheral edema, No cyanosis, clubbing   Vascular: Equal and normal pulses: 2+ peripheral pulses throughout  Neurological: A&O x 3; no sensory, motor  deficits, normal reflexes  Psychiatric: Normal mood, normal affect  Musculoskeletal: No joint pain, swelling or deformity; no limitation of movement  Skin: No rashes    LABS:             8.1    6.4   )-----------( 125      ( 18 Jul 2017 12:47 )             25.3   Mean Cell Volume : 74.9 fL  Mean Cell Hemoglobin : 24.0 pg  Mean Cell Hemoglobin Concentration : 32.0 g/dL  Auto Neutrophil % : 79.6 %  Auto Lymphocyte % : 11.6 %  Auto Monocyte % : 7.7 %  Auto Eosinophil % : 0.9 %  Auto Basophil % : 0.2 %      140  |  110<H>  |  50<H>  ----------------------------<  71  4.5   |  12<L>  |  3.10<H>    Ca    5.0<LL>   Phos  6.6     Mg     .5        TPro  5.7<L>  /  Alb  2.8<L>  /  TBili  0.4  /  DBili  x   /  AST  12  /  ALT  <5<L>  /  AlkPhos  48  07-18    PT/INR - ( 18 Jul 2017 12:47 )   PT: 14.6 sec;   INR: 1.31       PTT - ( 18 Jul 2017 12:47 )  PTT:38.4 sec      A/P: 85F with HTN, CAD, CKD 4, SBO s/p small bowel resection c/b chronic hypocalcemia, presenting with acute hypocalcemia and hypomagnesemia    # Abnormal Lab Values  - Patient found to have Ca 5.0 and Mg 0.5 although on supplementation at home. It is likely that patient has chronic abnormalities 2/2 decreased absorption on calcium carbonate and calcium acetate at home.   - Continue with IV and PO supplementation for a goal Ca >8 and Mag>2  - Dr Bryan aware of patient   - Will require admission for telemetry and monitoring for cardiac arrhythmia. EKG NSR at this time  - Continue to monitor for QT prolongation or development of arrhythmia  - Nutrition consut    # Anemia  - Patient with history of anemia, likely 2/2 CKD. As per past records has been considered to begin EPO therapy, however has not done so as yet. Has required multiple transfusions in the past.   - Continue to monitor and transfuse for Hgb <8    # Aortic Stenosis  - Seen and evaluated by Dr Jones on last admission. Noted to have mild aortic regurgitation with moderate AS with h/o CAD. No ASA 2/2 ASA allergy. No plavix 2/2 h/o anemia.   - Monitor for signs of fluid overload    # Nutrition  - Patient with h/o SBO and recent weight loss because of the procedure. Daughter states that the patient has been putting on more weight as of late with stable diet, however she appears slightly dry on exam.   - Will start gentle IVF hydration  - Monitor UOP given h/o CKD stage 4 and moderate AS  - Nutrition consult

## 2017-07-18 NOTE — H&P ADULT - NSHPLABSRESULTS_GEN_ALL_CORE
LABS:                        8.1    6.4   )-----------( 125      ( 2017 12:47 )             25.3     07-18    140  |  107  |  46<H>  ----------------------------<  91  4.9   |  13<L>  |  3.20<H>    Ca    5.4<LL>      2017 19:06  Phos  6.5       Mg     1.0         TPro  5.7<L>  /  Alb  2.8<L>  /  TBili  0.4  /  DBili  x   /  AST  12  /  ALT  <5<L>  /  AlkPhos  48  18    PT/INR - ( 2017 12:47 )   PT: 14.6 sec;   INR: 1.31          PTT - ( 2017 12:47 )  PTT:38.4 sec  Urinalysis Basic - ( 2017 14:29 )    Color: Yellow / Appearance: Clear / S.010 / pH: x  Gluc: x / Ketone: NEGATIVE  / Bili: NEGATIVE / Urobili: 0.2 E.U./dL   Blood: x / Protein: NEGATIVE mg/dL / Nitrite: NEGATIVE   Leuk Esterase: NEGATIVE / RBC: x / WBC x   Sq Epi: x / Non Sq Epi: x / Bacteria: x        RADIOLOGY & ADDITIONAL TESTS:

## 2017-07-18 NOTE — ED ADULT NURSE NOTE - OBJECTIVE STATEMENT
Pt presents to ED c/o abnormal labs. Pt BIB by family stating "she saw a new doctor yesterday Dr. Bryan and he did lab work, called us this morning told us her electrolytes are off and she needed to come to ED for eval." Pt endorses weakness and anxiety over the last week. Pt also noted to have bilateral LE edema and redness, daughter reports this started a few days ago. Pt denies fever, chills, HA, cough, dizziness, lightheadedness, visual changes, numbness/tingling, neck pain, CP, SOB, N/V/D, difficulty urinating, difficulty passing BM. Pt presents in NAD speaking full sentences via EMS stretcher through triage.

## 2017-07-18 NOTE — H&P ADULT - HISTORY OF PRESENT ILLNESS
HPI: 85F with HTN, HLD, CAD, CKD 4, SBO s/p resection in 10/2016, anemia, hypocalcemia, temporal arteritis recently admitted to [facility] about 1 month ago for abnormal lab values. As per patient, she is new to Dr Bryan whom she saw for the first time yesterday and had routine lab work drawn. Lab work returned with abnormal lab values reported as Ca 5.5 and Mag <0.5 and was told to present to the ED for admission.     In the ED lab work was repeated, patient given Calcium Carbonate 1250 mg + Vitamin D, Calcium Gluconate 2g IVPB, Magnesium Ox 800 x1 and Magnesium Sulfate 2g IVPB. ICU consulted for close monitoring in setting of electrolyte abnormalities. HPI: 85F with HTN, HLD, CAD, CKD 4, SBO s/p resection in 10/2016, anemia, hypocalcemia, temporal arteritis recently admitted to [facility] about 1 month ago for abnormal lab values presents today for abnormal lab values. As per patient, she is new to Dr Bryan whom she saw for the first time yesterday and had routine lab work drawn. Lab work returned with abnormal lab values reported as Ca 5.5 and Mag <0.5 and was told to present to the ED for admission. 1 month ago had 3 days of lethargy prior to coming to the hospital. This episode patient felt has had some recent lethargy and dizziness this AM. Patient also complained of some SOB and diarrhea 1 week ago which has now resolved. She also has a bilateral anterior lower leg rash that started this past Saturday that is asymptomatic. She is compliant with her home medications. She denies n/v, CP, SOB, bloody stools, or melena.     In the ED lab work was repeated, patient given Calcium Carbonate 1250 mg + Vitamin D, Calcium Gluconate 2g IVPB, Magnesium Ox 800 x1 and Magnesium Sulfate 2g IVPB. ICU consulted for close monitoring in setting of electrolyte abnormalities. HPI: 85F with HTN, HLD, CAD, CKD 4, SBO s/p resection in 10/2016, anemia, hypocalcemia, temporal arteritis recently admitted to [facility] about 1 month ago for abnormal lab values presents today for hypomagnesemia and hypokalemia. As per patient, she is new to Dr Bryan whom she saw for the first time yesterday and had routine lab work drawn. Lab work returned with abnormal lab values reported as Ca 5.5 and Mag <0.5 and was told to present to the ED for admission. 1 month ago had 3 days of lethargy prior to coming to the hospital. This episode patient felt has had some recent lethargy and dizziness this AM. Patient also complained of some SOB and diarrhea 1 week ago which has now resolved. She also has a bilateral anterior lower leg rash that started this past Saturday that is asymptomatic. She is compliant with her home medications. She denies n/v, CP, SOB, bloody stools, or melena.     In the ED lab work was repeated, patient given Calcium Carbonate 1250 mg + Vitamin D, Calcium Gluconate 2g IVPB, Magnesium Ox 800 x1 and Magnesium Sulfate 2g IVPB. EKG showed NSR

## 2017-07-18 NOTE — ED PROVIDER NOTE - PROGRESS NOTE DETAILS
Discussed w Dr Bryan - requests hospitalist admit and will follow.  Reports ca 5.3 and mg < 0.5 on labs yesterday.  Mg and phos pending here but ca, mg ordered. Pt discussed w Dr White who recommends 7 Lach admit rather than floor.  MICU team contacted and will eval.

## 2017-07-18 NOTE — ED PROVIDER NOTE - MEDICAL DECISION MAKING DETAILS
Pt c/o low ca, mg on outpt labs.  No sig sx.  Will repeat labs, discuss w Dr Bryan. Pt c/o low ca, mg on outpt labs.  No sig sx.  Will repeat labs, discuss w Dr Bryan,replete elie prn.

## 2017-07-18 NOTE — H&P ADULT - PROBLEM SELECTOR PROBLEM 8
Need for prophylactic measure Nutrition, metabolism, and development symptoms SBO (small bowel obstruction)

## 2017-07-18 NOTE — H&P ADULT - PROBLEM SELECTOR PLAN 7
F - no IVF  E - replete electrolytes Patient with h/o SBO and recent weight loss because of the procedure. Daughter states that the patient has been putting on more weight as of late with stable diet. Patient had diarrhea a week ago but now reports having formed stools  - monitor for diarrhea Seen and evaluated by Dr Jones on last admission. Noted to have mild aortic regurgitation with moderate AS with h/o CAD. No ASA 2/2 ASA allergy. No plavix 2/2 h/o anemia.  - monitor for signs of overload

## 2017-07-18 NOTE — CONSULT NOTE ADULT - ATTENDING COMMENTS
Patient seen and examined with house-staff during bedside rounds.  Resident note read, including vitals, physical findings, laboratory data, and radiological reports.   Revisions included below.  Direct personal management at bed side and extensive interpretation of the data.  Plan was outlined and discussed in details with the housestaff.  Decision making of high complexity  I discussed the case with [place] and ICU resident. Patient was started on IV calcium. I discussed the case in details with the family. Is no EKG changes off hypocalcemia

## 2017-07-18 NOTE — H&P ADULT - PROBLEM SELECTOR PLAN 3
Patient with Patient with known CKD stage 4. She is able to produce urine, BUN 46 and Cr 3.2.  - monitor UOP  - f/u BMPs  - avoid nephrotoxic medications  - renal diet

## 2017-07-18 NOTE — H&P ADULT - PROBLEM SELECTOR PLAN 8
F - no IVF  E - replete electrolytes  N - renal diet Patient with h/o SBO and recent weight loss because of the procedure. Daughter states that the patient has been putting on more weight as of late with stable diet. Patient had diarrhea a week ago but now reports having formed stools  - monitor for diarrhea

## 2017-07-18 NOTE — H&P ADULT - NSHPSOCIALHISTORY_GEN_ALL_CORE
denies tobacco, etoh, or illicit drug use. Patient lives at home by herself with a HHA that comes by 5 days a week for 6-7 hours.

## 2017-07-18 NOTE — H&P ADULT - PROBLEM SELECTOR PLAN 1
Patient found to have Ca 5.0 and Mg 0.5 although on supplementation at home. Most recently Ca is 5.4 and Mg is 1.0. It is likely that patient has chronic abnormalities 2/2 decreased absorption on calcium carbonate and calcium acetate at home. EKG NSR at this time. Pt is s/p Calcium Carbonate 1250 mg + Vitamin D, Calcium Gluconate 2g IVPB, Magnesium Ox 800 x1 and Magnesium Sulfate 2g IVPB in ED and Manesium sulfate 2g x1 and calcium gluconate 2g x1 on the floors.  - repeat BMP at 2AM/5AM??  - replete Ca >8 and Mag >2  - Continue to monitor for QT prolongation or development of arrhythmia  - Nutrition consult Patient found to have Ca 5.0 and Mg 0.5 although on supplementation at home. Most recently Ca is 5.4 and Mg is 1.0. It is likely that patient has chronic abnormalities 2/2 decreased absorption on calcium carbonate and calcium acetate at home. EKG NSR at this time. Pt is s/p Calcium Carbonate 1250 mg + Vitamin D, Calcium Gluconate 2g IVPB, Magnesium Ox 800 x1 and Magnesium Sulfate 2g IVPB in ED and Manesium sulfate 2g x1 and calcium gluconate 2g x1 on the floors.  - repeat BMP at 5AM, f/u  - replete Ca >8 and Mag >2  - Continue to monitor for QT prolongation or development of arrhythmia  - Nutrition consult

## 2017-07-18 NOTE — H&P ADULT - PROBLEM SELECTOR PLAN 4
Patient takes Patient takes coreg and hydralazine at home  - continue home medications  - patient was recently placed on tamsulosin

## 2017-07-18 NOTE — ED PROVIDER NOTE - OBJECTIVE STATEMENT
84 yo female h/o 86 yo female h/o cri, cad s/p mi, htn, gerd sent by Dr Bryan for low ca and mg. Pt w h/o similar, taking ca/mg supplements.  No cp, palpitations, n/v/d, change in urination, change in po's, muscle pain or cramping.  Ca 5, mg 0.5 as outpt.

## 2017-07-18 NOTE — H&P ADULT - NSHPPHYSICALEXAM_GEN_ALL_CORE
VITAL SIGNS:  T(F): 98.7 (07-18-17 @ 20:45)  HR: 76 (07-18-17 @ 20:45)  BP: 182/79 (07-18-17 @ 20:45)  RR: 17 (07-18-17 @ 20:45)  SpO2: 99% (07-18-17 @ 20:45)  Wt(kg): --    PHYSICAL EXAM:    Constitutional: WDWN, NAD  Eyes: PERRL, EOMI, sclera non-icteric  Neck: supple, trachea midline, no masses, no JVD  Respiratory: diminished breath sounds  Cardiovascular: holosystolic crescendo, decrescendo murmur  Gastrointestinal: soft, NTND, no masses palpable, BS normal  Extremities: Warm, well perfused, bilateral 1+ edema with non-blanching erythema on lower legs from ankle to knee with some extention into the left upper thigh. warm and non-tender to palpation.  Neurological: AAOx3, CN Grossly intact  Skin: Normal temperature, warm, dry VITAL SIGNS:  T(F): 98.7 (07-18-17 @ 20:45)  HR: 76 (07-18-17 @ 20:45)  BP: 182/79 (07-18-17 @ 20:45)  RR: 17 (07-18-17 @ 20:45)  SpO2: 99% (07-18-17 @ 20:45)  Wt(kg): --    PHYSICAL EXAM:    Constitutional: WDWN, NAD  Eyes: PERRL, EOMI, sclera non-icteric  Neck: supple, trachea midline, no masses, no JVD  Respiratory: diminished breath sounds  Cardiovascular: RRR, systolic crescendo, decrescendo murmur  Gastrointestinal: soft, NTND, no masses palpable, BS normal  Extremities: Warm, well perfused, bilateral 1+ edema with non-blanching erythema on lower legs from ankle to knee with some extention into the left upper thigh. warm and non-tender to palpation.  Neurological: AAOx3, CN Grossly intact  Skin: Normal temperature, warm, dry

## 2017-07-18 NOTE — H&P ADULT - PROBLEM SELECTOR PLAN 5
Patient legs erythematous since Saturday. No pain, itchiness, non-tender. Possible petechial rash secondary to thrombocytopenia.  -monitor for change in status Patient has a history of temporal arteritis  - continue home dose of prednisone 5mg

## 2017-07-19 DIAGNOSIS — M31.6 OTHER GIANT CELL ARTERITIS: ICD-10-CM

## 2017-07-19 LAB
ANION GAP SERPL CALC-SCNC: 17 MMOL/L — SIGNIFICANT CHANGE UP (ref 5–17)
ANION GAP SERPL CALC-SCNC: 18 MMOL/L — HIGH (ref 5–17)
BASOPHILS # BLD AUTO: 0.02 K/UL
BASOPHILS NFR BLD AUTO: 0.3 %
BLD GP AB SCN SERPL QL: NEGATIVE — SIGNIFICANT CHANGE UP
BUN SERPL-MCNC: 50 MG/DL — HIGH (ref 7–23)
BUN SERPL-MCNC: 52 MG/DL — HIGH (ref 7–23)
CALCIUM SERPL-MCNC: 5.9 MG/DL — CRITICAL LOW (ref 8.4–10.5)
CALCIUM SERPL-MCNC: 5.9 MG/DL — CRITICAL LOW (ref 8.4–10.5)
CALCIUM SERPL-MCNC: 6.9 MG/DL — LOW (ref 8.4–10.5)
CHLORIDE SERPL-SCNC: 108 MMOL/L — SIGNIFICANT CHANGE UP (ref 96–108)
CHLORIDE SERPL-SCNC: 110 MMOL/L — HIGH (ref 96–108)
CHOLEST SERPL-MCNC: 157 MG/DL — SIGNIFICANT CHANGE UP (ref 10–199)
CO2 SERPL-SCNC: 13 MMOL/L — LOW (ref 22–31)
CO2 SERPL-SCNC: 16 MMOL/L — LOW (ref 22–31)
CREAT SERPL-MCNC: 3.1 MG/DL — HIGH (ref 0.5–1.3)
CREAT SERPL-MCNC: 3.1 MG/DL — HIGH (ref 0.5–1.3)
EOSINOPHIL # BLD AUTO: 0.04 K/UL
EOSINOPHIL NFR BLD AUTO: 0.6 %
GLUCOSE SERPL-MCNC: 80 MG/DL — SIGNIFICANT CHANGE UP (ref 70–99)
GLUCOSE SERPL-MCNC: 91 MG/DL — SIGNIFICANT CHANGE UP (ref 70–99)
HCT VFR BLD CALC: 21.2 % — LOW (ref 34.5–45)
HCT VFR BLD CALC: 22.4 % — LOW (ref 34.5–45)
HCT VFR BLD CALC: 25.4 %
HCT VFR BLD CALC: 26.3 % — LOW (ref 34.5–45)
HDLC SERPL-MCNC: 53 MG/DL — SIGNIFICANT CHANGE UP (ref 40–125)
HGB BLD-MCNC: 6.9 G/DL — CRITICAL LOW (ref 11.5–15.5)
HGB BLD-MCNC: 7.4 G/DL — LOW (ref 11.5–15.5)
HGB BLD-MCNC: 7.7 G/DL
HGB BLD-MCNC: 8.8 G/DL — LOW (ref 11.5–15.5)
IMM GRANULOCYTES NFR BLD AUTO: 0.3 %
LIPID PNL WITH DIRECT LDL SERPL: 79 MG/DL — SIGNIFICANT CHANGE UP
LYMPHOCYTES # BLD AUTO: 1.45 K/UL
LYMPHOCYTES NFR BLD AUTO: 23 %
MAGNESIUM SERPL-MCNC: 2.1 MG/DL — SIGNIFICANT CHANGE UP (ref 1.6–2.6)
MAGNESIUM SERPL-MCNC: 2.4 MG/DL — SIGNIFICANT CHANGE UP (ref 1.6–2.6)
MAN DIFF?: NORMAL
MCHC RBC-ENTMCNC: 23.5 PG
MCHC RBC-ENTMCNC: 24.1 PG — LOW (ref 27–34)
MCHC RBC-ENTMCNC: 24.4 PG — LOW (ref 27–34)
MCHC RBC-ENTMCNC: 25.1 PG — LOW (ref 27–34)
MCHC RBC-ENTMCNC: 30.3 GM/DL
MCHC RBC-ENTMCNC: 32.5 G/DL — SIGNIFICANT CHANGE UP (ref 32–36)
MCHC RBC-ENTMCNC: 33 G/DL — SIGNIFICANT CHANGE UP (ref 32–36)
MCHC RBC-ENTMCNC: 33.5 G/DL — SIGNIFICANT CHANGE UP (ref 32–36)
MCV RBC AUTO: 73.9 FL — LOW (ref 80–100)
MCV RBC AUTO: 74.1 FL — LOW (ref 80–100)
MCV RBC AUTO: 74.9 FL — LOW (ref 80–100)
MCV RBC AUTO: 77.4 FL
MONOCYTES # BLD AUTO: 0.94 K/UL
MONOCYTES NFR BLD AUTO: 14.9 %
NEUTROPHILS # BLD AUTO: 3.84 K/UL
NEUTROPHILS NFR BLD AUTO: 60.9 %
PHOSPHATE SERPL-MCNC: 5.3 MG/DL — HIGH (ref 2.5–4.5)
PLATELET # BLD AUTO: 116 K/UL — LOW (ref 150–400)
PLATELET # BLD AUTO: 138 K/UL — LOW (ref 150–400)
PLATELET # BLD AUTO: 143 K/UL — LOW (ref 150–400)
PLATELET # BLD AUTO: 152 K/UL
POTASSIUM SERPL-MCNC: 4.3 MMOL/L — SIGNIFICANT CHANGE UP (ref 3.5–5.3)
POTASSIUM SERPL-MCNC: 4.3 MMOL/L — SIGNIFICANT CHANGE UP (ref 3.5–5.3)
POTASSIUM SERPL-SCNC: 4.3 MMOL/L — SIGNIFICANT CHANGE UP (ref 3.5–5.3)
POTASSIUM SERPL-SCNC: 4.3 MMOL/L — SIGNIFICANT CHANGE UP (ref 3.5–5.3)
RBC # BLD: 2.86 M/UL — LOW (ref 3.8–5.2)
RBC # BLD: 3.03 M/UL — LOW (ref 3.8–5.2)
RBC # BLD: 3.28 M/UL
RBC # BLD: 3.51 M/UL — LOW (ref 3.8–5.2)
RBC # FLD: 21.3 % — HIGH (ref 10.3–16.9)
RBC # FLD: 22.2 % — HIGH (ref 10.3–16.9)
RBC # FLD: 22.3 % — HIGH (ref 10.3–16.9)
RBC # FLD: 23.4 %
RH IG SCN BLD-IMP: POSITIVE — SIGNIFICANT CHANGE UP
SODIUM SERPL-SCNC: 141 MMOL/L — SIGNIFICANT CHANGE UP (ref 135–145)
SODIUM SERPL-SCNC: 141 MMOL/L — SIGNIFICANT CHANGE UP (ref 135–145)
TOTAL CHOLESTEROL/HDL RATIO MEASUREMENT: 3 RATIO — LOW (ref 3.3–7.1)
TRIGL SERPL-MCNC: 127 MG/DL — SIGNIFICANT CHANGE UP (ref 10–149)
WBC # BLD: 4.7 K/UL — SIGNIFICANT CHANGE UP (ref 3.8–10.5)
WBC # BLD: 5.2 K/UL — SIGNIFICANT CHANGE UP (ref 3.8–10.5)
WBC # BLD: 6 K/UL — SIGNIFICANT CHANGE UP (ref 3.8–10.5)
WBC # FLD AUTO: 4.7 K/UL — SIGNIFICANT CHANGE UP (ref 3.8–10.5)
WBC # FLD AUTO: 5.2 K/UL — SIGNIFICANT CHANGE UP (ref 3.8–10.5)
WBC # FLD AUTO: 6 K/UL — SIGNIFICANT CHANGE UP (ref 3.8–10.5)
WBC # FLD AUTO: 6.31 K/UL

## 2017-07-19 PROCEDURE — 93010 ELECTROCARDIOGRAM REPORT: CPT

## 2017-07-19 PROCEDURE — 99233 SBSQ HOSP IP/OBS HIGH 50: CPT | Mod: GC

## 2017-07-19 RX ORDER — HYDRALAZINE HCL 50 MG
50 TABLET ORAL
Qty: 0 | Refills: 0 | Status: DISCONTINUED | OUTPATIENT
Start: 2017-07-19 | End: 2017-07-19

## 2017-07-19 RX ORDER — CARVEDILOL PHOSPHATE 80 MG/1
6.25 CAPSULE, EXTENDED RELEASE ORAL EVERY 12 HOURS
Qty: 0 | Refills: 0 | Status: DISCONTINUED | OUTPATIENT
Start: 2017-07-19 | End: 2017-07-19

## 2017-07-19 RX ORDER — SODIUM BICARBONATE 1 MEQ/ML
650 SYRINGE (ML) INTRAVENOUS
Qty: 0 | Refills: 0 | Status: DISCONTINUED | OUTPATIENT
Start: 2017-07-19 | End: 2017-07-21

## 2017-07-19 RX ORDER — ACETAMINOPHEN 500 MG
650 TABLET ORAL EVERY 6 HOURS
Qty: 0 | Refills: 0 | Status: DISCONTINUED | OUTPATIENT
Start: 2017-07-19 | End: 2017-07-21

## 2017-07-19 RX ORDER — CALCIUM ACETATE 667 MG
1334 TABLET ORAL
Qty: 0 | Refills: 0 | Status: DISCONTINUED | OUTPATIENT
Start: 2017-07-19 | End: 2017-07-21

## 2017-07-19 RX ORDER — MAGNESIUM SULFATE 500 MG/ML
2 VIAL (ML) INJECTION ONCE
Qty: 0 | Refills: 0 | Status: COMPLETED | OUTPATIENT
Start: 2017-07-19 | End: 2017-07-19

## 2017-07-19 RX ORDER — HYDRALAZINE HCL 50 MG
50 TABLET ORAL ONCE
Qty: 0 | Refills: 0 | Status: COMPLETED | OUTPATIENT
Start: 2017-07-19 | End: 2017-07-19

## 2017-07-19 RX ORDER — HYDRALAZINE HCL 50 MG
5 TABLET ORAL ONCE
Qty: 0 | Refills: 0 | Status: COMPLETED | OUTPATIENT
Start: 2017-07-19 | End: 2017-07-19

## 2017-07-19 RX ORDER — LABETALOL HCL 100 MG
5 TABLET ORAL ONCE
Qty: 0 | Refills: 0 | Status: COMPLETED | OUTPATIENT
Start: 2017-07-19 | End: 2017-07-19

## 2017-07-19 RX ORDER — CALCIUM CARBONATE 500(1250)
2 TABLET ORAL EVERY 8 HOURS
Qty: 0 | Refills: 0 | Status: DISCONTINUED | OUTPATIENT
Start: 2017-07-19 | End: 2017-07-20

## 2017-07-19 RX ORDER — ERYTHROPOIETIN 10000 [IU]/ML
10000 INJECTION, SOLUTION INTRAVENOUS; SUBCUTANEOUS ONCE
Qty: 0 | Refills: 0 | Status: DISCONTINUED | OUTPATIENT
Start: 2017-07-19 | End: 2017-07-21

## 2017-07-19 RX ORDER — HYDRALAZINE HCL 50 MG
25 TABLET ORAL ONCE
Qty: 0 | Refills: 0 | Status: COMPLETED | OUTPATIENT
Start: 2017-07-19 | End: 2017-07-19

## 2017-07-19 RX ORDER — HYDRALAZINE HCL 50 MG
75 TABLET ORAL
Qty: 0 | Refills: 0 | Status: DISCONTINUED | OUTPATIENT
Start: 2017-07-20 | End: 2017-07-21

## 2017-07-19 RX ORDER — CALCIUM GLUCONATE 100 MG/ML
2 VIAL (ML) INTRAVENOUS ONCE
Qty: 2 | Refills: 0 | Status: COMPLETED | OUTPATIENT
Start: 2017-07-19 | End: 2017-07-19

## 2017-07-19 RX ADMIN — Medication 1 TABLET(S): at 00:26

## 2017-07-19 RX ADMIN — Medication 50 MILLIGRAM(S): at 08:39

## 2017-07-19 RX ADMIN — Medication 50 GRAM(S): at 05:52

## 2017-07-19 RX ADMIN — Medication 50 MILLIGRAM(S): at 14:12

## 2017-07-19 RX ADMIN — Medication 1334 MILLIGRAM(S): at 17:03

## 2017-07-19 RX ADMIN — LORATADINE 10 MILLIGRAM(S): 10 TABLET ORAL at 11:43

## 2017-07-19 RX ADMIN — Medication 50 MILLIGRAM(S): at 01:10

## 2017-07-19 RX ADMIN — Medication 2 TABLET(S): at 22:11

## 2017-07-19 RX ADMIN — CARVEDILOL PHOSPHATE 6.25 MILLIGRAM(S): 80 CAPSULE, EXTENDED RELEASE ORAL at 17:03

## 2017-07-19 RX ADMIN — Medication 10 MILLIGRAM(S): at 16:11

## 2017-07-19 RX ADMIN — Medication 1334 MILLIGRAM(S): at 11:43

## 2017-07-19 RX ADMIN — Medication 50 MILLIGRAM(S): at 19:30

## 2017-07-19 RX ADMIN — Medication 2 TABLET(S): at 14:12

## 2017-07-19 RX ADMIN — Medication 1 TABLET(S): at 06:01

## 2017-07-19 RX ADMIN — TAMSULOSIN HYDROCHLORIDE 0.4 MILLIGRAM(S): 0.4 CAPSULE ORAL at 22:11

## 2017-07-19 RX ADMIN — Medication 5 MILLIGRAM(S): at 06:01

## 2017-07-19 RX ADMIN — PANTOPRAZOLE SODIUM 40 MILLIGRAM(S): 20 TABLET, DELAYED RELEASE ORAL at 07:43

## 2017-07-19 RX ADMIN — Medication 5 MILLIGRAM(S): at 17:47

## 2017-07-19 RX ADMIN — CARVEDILOL PHOSPHATE 6.25 MILLIGRAM(S): 80 CAPSULE, EXTENDED RELEASE ORAL at 06:01

## 2017-07-19 RX ADMIN — Medication 200 GRAM(S): at 16:11

## 2017-07-19 RX ADMIN — Medication 667 MILLIGRAM(S): at 07:43

## 2017-07-19 RX ADMIN — Medication 5 MILLIGRAM(S): at 17:22

## 2017-07-19 NOTE — PROGRESS NOTE ADULT - PROBLEM SELECTOR PLAN 8
Patient with h/o SBO and recent weight loss because of the procedure. Daughter states that the patient has been putting on more weight as of late with stable diet. Patient had diarrhea a week ago but now reports having formed stools  - monitor for diarrhea

## 2017-07-19 NOTE — CONSULT NOTE ADULT - ASSESSMENT
5F with HTN, CAD, CKD 4, SBO s/p small bowel resection c/b chronic hypocalcemia, presenting with acute hypocalcemia and hypomagnesemia    Problem/Plan - 1:  ·  Problem: Electrolyte imbalance.  Plan: Patient found to have Ca 5.0 and Mg 0.5 although on supplementation at home. Most recently Ca is 5.4 and Mg is 1.0. It is likely that patient has chronic abnormalities 2/2 decreased absorption on calcium carbonate and calcium acetate at home. EKG NSR at this time. Pt is s/p Calcium Carbonate 1250 mg + Vitamin D, Calcium Gluconate 2g IVPB, Magnesium Ox 800 x1 and Magnesium Sulfate 2g IVPB in ED and Manesium sulfate 2g x1 and calcium gluconate 2g x1 on the floors.  - repeat BMP at 5AM, f/u  - replete Ca >8 and Mag >2  - Continue to monitor for QT prolongation or development of arrhythmia  - Nutrition consult.     Problem/Plan - 2:  ·  Problem: Anemia.  Plan: Patient with Hb of 8.1. History of anemia, baseline 7-9, likely 2/2 CKD. As per past records has been considered to begin EPO therapy, however has not done so as yet. Patient was on iron, but stopped taking it recently due to constipation. Has required multiple transfusions in the past.   - Continue to monitor and transfuse for Hgb <8.

## 2017-07-19 NOTE — PROGRESS NOTE ADULT - PROBLEM SELECTOR PLAN 3
Patient with known CKD stage 4. She is able to produce urine, BUN 46 and Cr 3.2.  - monitor UOP  - f/u BMPs  - avoid nephrotoxic medications  - renal diet Patient with known CKD stage 4. She is able to produce urine, Cr 3.1 (7/19)  - f/u BMPs  - avoid nephrotoxic medications  - renal diet

## 2017-07-19 NOTE — PHYSICAL THERAPY INITIAL EVALUATION ADULT - ADDITIONAL COMMENTS
Pt states that she lives alone in elevator access [facility] building w/ 2 steps to enter HR on one side. States that her daughter also lives in the building a floor below her and that she has a HHA 5hrs/day 5x/wk. Uses a RW for ambulation. 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

## 2017-07-19 NOTE — PROGRESS NOTE ADULT - PROBLEM SELECTOR PLAN 4
Patient takes coreg and hydralazine at home  - continue home medications  - patient was recently placed on tamsulosin Continue home carvedilol 6.25mg BID and hydralazine 50mg TID, tamulosin 0.4mg QHS Continue home carvedilol 6.25mg BID, tamulosin 0.4mg QHS\  Increased hydralazine 50mg 4x a day because hypertensive

## 2017-07-19 NOTE — DIETITIAN INITIAL EVALUATION ADULT. - OTHER INFO
85F with HTN, CAD, CKD 4, SBO s/p small bowel resection c/b chronic hypocalcemia, presenting with acute hypocalcemia and hypomagnesemia. Reports appetite has been good both PTA & currently. States weight change of 25# involuntary loss s/p bowel surgery in October of 2016. States no difficulty with chewing/swallowing. States she does not tolerate oral nutrition supplements well, not willing to try ensure enlive at this time.

## 2017-07-19 NOTE — DIETITIAN INITIAL EVALUATION ADULT. - ENERGY NEEDS
52 kg ABW; 50 kg IBW; 104% IBW; BMI 21.   ABW used due to pt between % of IBW.  Needs increased due to need for repletion/wt loss PTA.

## 2017-07-19 NOTE — PROGRESS NOTE ADULT - PROBLEM SELECTOR PLAN 1
Patient found to have Ca 5.0 and Mg 0.5 although on supplementation at home due to large small bowel resection.   -7/19 AM: Ca 5.9, Mg 2.4  -replete Ca >8 and Mag >2  -EKG showing no QT prolongation Continue to monitor for QT prolongation with EKG daily  - Nutrition consult Patient found to have Ca 5.0 and Mg 0.5 although on supplementation at home due to large small bowel resection.   -7/19 AM: Ca 5.9 (corrected 6.5), Mg 2.4 s/p 5 grams of calcium, will receive 2 more grams IV calcium, f/u 6PM BPM, Mg  -replete Ca >8 and Mag >2  -EKG showing no QT prolongation Continue to monitor for QT prolongation with EKG daily  - Nutrition consult

## 2017-07-19 NOTE — CONSULT NOTE ADULT - SUBJECTIVE AND OBJECTIVE BOX
HPI:    Seen in office 2 days ago. Called with magnesium of 0.5 and calcium of 5 with near normal albumin. ANTONIA improved, creatinine decreased to 3 (from high of 4s last admission). Instructed to come to ED. Reportedly normal QTc. Calcium replaced orally and magnesium replaced intravenously. Complained of fatigue and generalized weakness, which has now improved. Transfused for HGB of 7s -> 8s. BP labile/uncontrolled.       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: S/P partial hysterectomy      MEDICATIONS:  carvedilol 6.25 milliGRAM(s) Oral every 12 hours  loratadine 10 milliGRAM(s) Oral daily  tamsulosin 0.4 milliGRAM(s) Oral at bedtime  pantoprazole    Tablet 40 milliGRAM(s) Oral before breakfast  predniSONE   Tablet 5 milliGRAM(s) Oral daily  calcium carbonate 1250 mG (OsCal) 2 Tablet(s) Oral every 8 hours  calcium acetate 1334 milliGRAM(s) Oral three times a day with meals  epoetin suhas Injectable 84718 Unit(s) SubCutaneous once  hydrALAZINE 50 milliGRAM(s) Oral four times a day      No pertinent family history in first degree relatives      SOCIAL HISTORY: No smoking or ETOH.     REVIEW OF SYSTEMS:  Constitutional: No fevers.   Card: No chest pain.   Resp: No SOB.  GI: No nausea or vomiting. No abdominal pain.  : No dysuria. Neuro: No focal weakness or sensory loss.  Eyes: No visual symptoms. MS: No joint swelling.  Skin: No rashes. Psych: No psychosis.    PHYSICAL EXAM:    Constitutional: T(C): 36.7 (17 @ 14:07), Max: 37.1 (17 @ 20:45)  HR: 74 (17 @ 14:20) (72 - 94)  BP: 201/94 (17 @ 14:20) (158/72 - 201/94)  RR: 18 (17 @ 14:20) (16 - 18)  SpO2: 99% (07-19-17 @ 14:20) (96% - 100%)  Wt(kg): --  Appearance: Alert, pleasant, INAD.  Eyes: Conjunctivae pink, moist. Pupils equal and reactive.  ENT: External ears/nose normal appearing. Lips normal, dentition good.  Lymphatic: No cervical lymphadenopathy. No supraclavicular lymphadenopathy.  Cardiac: No rubs. NO MURMURS. Extremities: PITTING BILATERAL ANKLE EDEMA  Respiratory: Effort no accessory muscle use. Lungs: CLEAR TO AUSCULTATION.  Abdomen: Soft. No masses. Nontender. Liver: Not palpable. Spleen: Not palpable.  Musculoskeltal digits: No clubbing or cyanosis. Tone normal. Moves all four extremities.  Skin inspection: bilateral shin edema, no warmth. Palpation: No abnormalities.  Neuro: Cranial nerves: no facial assymetry or deficits noted. Sensation: LE intact to light touch.  Psych: Oriented to person, place, situation. Mood/affect: Not depressed.     DATA:  141    |  110<H>  |  52<H>  ----------------------------<  80     Ca:5.9<LL> (2017 07:22)  4.3     |  13<L>  |  3.10<H>      eGFR if Non : 13 <L>  eGFR if : 15 <L>    TPro  5.7 g/dL<L>  /  Alb  2.8 g/dL<L>  /  TBili  0.4 mg/dL  /  DBili  x      /  AST  12 U/L  /  ALT  <5 U/L<L>  /  AlkPhos  48 U/L  2017 13:40                        7.4<L>  5.2   )-----------( 138<L>    ( 2017 09:39 )             22.4<L>    Phos:5.3 mg/dL<H> M.4 mg/dL PTH:-- Uric acid:-- Serum Osm:--  Ferritin:-- Iron:-- TIBC:-- Tsat:--  B12:-- TSH:-- ( @ 07:22)    Urinalysis Basic - ( 2017 14:29 )  Color: Yellow / Appearance: Clear / S.010 / pH: x  Gluc: x / Ketone: NEGATIVE  / Bili: NEGATIVE / Urobili: 0.2 E.U./dL   Blood: x / Protein: NEGATIVE mg/dL / Nitrite: NEGATIVE   Leuk Esterase: NEGATIVE / RBC: x / WBC x   Sq Epi: x / Non Sq Epi: x / Bacteria: x

## 2017-07-19 NOTE — PROGRESS NOTE ADULT - PROBLEM SELECTOR PLAN 2
Patient with Hb of 8.1. History of anemia, baseline 7-9, likely 2/2 CKD. As per past records has been considered to begin EPO therapy, however has not done so as yet. Patient was on iron, but stopped taking it recently due to constipation. Has required multiple transfusions in the past.   - Continue to monitor and transfuse for Hgb <8 Hgb 6.9 (7/19 AM) repeat 7.3, but CAD threshold of 8 for transfusion, received 1 unit of PRBC, will recheck CBC 6PM, will f/u  Spoke to PCP Dr. Hoyt. Baseline Hgb 8-10. Patient had gastric ulcer in the past, but worked up recently and recommended no GI work up, just protonix for PPX, believes its secondary to CKD, received transfusions recently   -start pantoprazole 40mg   -epo today, managed by renal

## 2017-07-19 NOTE — PROGRESS NOTE ADULT - SUBJECTIVE AND OBJECTIVE BOX
INTERVAL HPI/OVERNIGHT EVENTS:    OVERNIGHT: Admitted Overnight.   SUBJECTIVE: Patient seen and examined at bedside. Patient has no complaints.     ROS:  CV: Denies chest pain  Resp: Denies SOB  GI: Denies abdominal pain, constipation, diarrhea, nausea, vomiting  : Denies dysuria  ID: Denies fevers, chills  MSK: Denies joint pain     OBJECTIVE:    VITAL SIGNS:  ICU Vital Signs Last 24 Hrs  T(C): 36.7 (2017 14:07), Max: 37.1 (2017 20:45)  T(F): 98 (2017 14:07), Max: 98.7 (2017 20:45)  HR: 74 (2017 14:20) (72 - 94)  BP: 201/94 (2017 14:20) (158/72 - 201/94)  BP(mean): 135 (2017 14:20) (104 - 135)  ABP: --  ABP(mean): --  RR: 18 (2017 14:20) (16 - 18)  SpO2: 99% (2017 14:20) (96% - 100%)        CAPILLARY BLOOD GLUCOSE          PHYSICAL EXAM:    General: NAD, comfortable  HEENT: NCAT, PERRL, clear conjunctiva, no scleral icterus  Neck: supple, no JVD  Respiratory: CTA b/l, no wheezing, rhonchi, rales  Cardiovascular: RRR, harsh systolic murmur, no M/R/G  Abdomen: soft, NT/ND, bowel sounds in all four quadrants, no palpable masses  Extremities: WWP, no clubbing, cyanosis, mild edema  Neuro: grossly intact   skin: erythema of the lower extremities, not warm, not painful    MEDICATIONS:  MEDICATIONS  (STANDING):  carvedilol 6.25 milliGRAM(s) Oral every 12 hours  loratadine 10 milliGRAM(s) Oral daily  hydrALAZINE 50 milliGRAM(s) Oral three times a day  tamsulosin 0.4 milliGRAM(s) Oral at bedtime  pantoprazole    Tablet 40 milliGRAM(s) Oral before breakfast  predniSONE   Tablet 5 milliGRAM(s) Oral daily  calcium carbonate 1250 mG (OsCal) 2 Tablet(s) Oral every 8 hours  calcium acetate 1334 milliGRAM(s) Oral three times a day with meals  epoetin suhas Injectable 35184 Unit(s) SubCutaneous once  torsemide 10 milliGRAM(s) Oral once    MEDICATIONS  (PRN):      ALLERGIES:  Allergies    angiotensin converting enzyme inhibitors (Angioedema)  aspirin (Hives; Rash)  lactose (Diarrhea)  penicillin (Angioedema)  Seafood (Short breath)  shellfish (Short breath)  spinach (Unknown)    Intolerances        LABS:                        7.4    5.2   )-----------( 138      ( 2017 09:39 )             22.4         141  |  110<H>  |  52<H>  ----------------------------<  80  4.3   |  13<L>  |  3.10<H>    Ca    5.9<LL>      2017 07:22  Phos  5.3       Mg     2.4         TPro  5.7<L>  /  Alb  2.8<L>  /  TBili  0.4  /  DBili  x   /  AST  12  /  ALT  <5<L>  /  AlkPhos  48      PT/INR - ( 2017 12:47 )   PT: 14.6 sec;   INR: 1.31          PTT - ( 2017 12:47 )  PTT:38.4 sec  Urinalysis Basic - ( 2017 14:29 )    Color: Yellow / Appearance: Clear / S.010 / pH: x  Gluc: x / Ketone: NEGATIVE  / Bili: NEGATIVE / Urobili: 0.2 E.U./dL   Blood: x / Protein: NEGATIVE mg/dL / Nitrite: NEGATIVE   Leuk Esterase: NEGATIVE / RBC: x / WBC x   Sq Epi: x / Non Sq Epi: x / Bacteria: x        RADIOLOGY & ADDITIONAL TESTS: Reviewed.

## 2017-07-19 NOTE — PROGRESS NOTE ADULT - PROBLEM SELECTOR PLAN 6
Patient legs erythematous since Saturday. No pain, itchiness, non-tender. Possible petechial rash secondary to thrombocytopenia.  -monitor for change in status Patient legs erythematous since Saturday. No pain, itchiness, non-tender. nonconsistent with cellulitis   -monitor for change in status

## 2017-07-19 NOTE — CONSULT NOTE ADULT - SUBJECTIVE AND OBJECTIVE BOX
Patient is a 85y old  Female who presents with a chief complaint of electrolyte abnormalities (2017 21:47)      HPI:  HPI: 85F with HTN, HLD, CAD, CKD 4, SBO s/p resection in 10/2016, anemia, hypocalcemia, temporal arteritis recently admitted to [facility] about 1 month ago for abnormal lab values presents today for hypomagnesemia and hypokalemia. As per patient, she is new to Dr Bryan whom she saw for the first time yesterday and had routine lab work drawn. Lab work returned with abnormal lab values reported as Ca 5.5 and Mag <0.5 and was told to present to the ED for admission. 1 month ago had 3 days of lethargy prior to coming to the hospital. This episode patient felt has had some recent lethargy and dizziness this AM. Patient also complained of some SOB and diarrhea 1 week ago which has now resolved. She also has a bilateral anterior lower leg rash that started this past Saturday that is asymptomatic. She is compliant with her home medications. She denies n/v, CP, SOB, bloody stools, or melena.     In the ED lab work was repeated, patient given Calcium Carbonate 1250 mg + Vitamin D, Calcium Gluconate 2g IVPB, Magnesium Ox 800 x1 and Magnesium Sulfate 2g IVPB. EKG showed NSR (2017 21:47)      PAST MEDICAL & SURGICAL HISTORY:  Heart attack  Temporal arteritis  SBO (small bowel obstruction)  Essential hypertension: HTN (hypertension)  Gastroesophageal reflux disease: GERD (gastroesophageal reflux disease)  Asthma: Asthma  Chronic kidney disease: CKD (chronic kidney disease)  S/P hysterectomy: partial hysterectomy  History of bowel resection  History of appendectomy      MEDICATIONS  (STANDING):  carvedilol 6.25 milliGRAM(s) Oral every 12 hours  loratadine 10 milliGRAM(s) Oral daily  calcium carbonate 1250 mG (OsCal) 1 Tablet(s) Oral every 6 hours  calcium acetate 667 milliGRAM(s) Oral three times a day with meals  hydrALAZINE 50 milliGRAM(s) Oral three times a day  tamsulosin 0.4 milliGRAM(s) Oral at bedtime  pantoprazole    Tablet 40 milliGRAM(s) Oral before breakfast  predniSONE   Tablet 5 milliGRAM(s) Oral daily    MEDICATIONS  (PRN):      Social History: lives alone in an elevator accessible apartment building, daughter Pascale lives across the bec[place], has home attendant 5 days x 6 hours/day    Functional Level Prior to Admission: reports ADL independent, walks with a rolling walker    FAMILY HISTORY:  No pertinent family history in first degree relatives      CBC Full  -  ( 2017 07:22 )  WBC Count : 4.7 K/uL  Hemoglobin : 6.9 g/dL  Hematocrit : 21.2 %  Platelet Count - Automated : 116 K/uL  Mean Cell Volume : 74.1 fL  Mean Cell Hemoglobin : 24.1 pg  Mean Cell Hemoglobin Concentration : 32.5 g/dL  Auto Neutrophil # : x  Auto Lymphocyte # : x  Auto Monocyte # : x  Auto Eosinophil # : x  Auto Basophil # : x  Auto Neutrophil % : x  Auto Lymphocyte % : x  Auto Monocyte % : x  Auto Eosinophil % : x  Auto Basophil % : x          140  |  108  |  46<H>  ----------------------------<  98  4.4   |  13<L>  |  3.20<H>    Ca    5.9<LL>      2017 22:56  Phos  6.5       Mg     1.7         TPro  5.7<L>  /  Alb  2.8<L>  /  TBili  0.4  /  DBili  x   /  AST  12  /  ALT  <5<L>  /  AlkPhos  48        Urinalysis Basic - ( 2017 14:29 )    Color: Yellow / Appearance: Clear / S.010 / pH: x  Gluc: x / Ketone: NEGATIVE  / Bili: NEGATIVE / Urobili: 0.2 E.U./dL   Blood: x / Protein: NEGATIVE mg/dL / Nitrite: NEGATIVE   Leuk Esterase: NEGATIVE / RBC: x / WBC x   Sq Epi: x / Non Sq Epi: x / Bacteria: x          Radiology:              Vital Signs Last 24 Hrs  T(C): 36.9 (2017 05:17), Max: 37.1 (2017 20:45)  T(F): 98.5 (2017 05:17), Max: 98.7 (2017 20:45)  HR: 76 (2017 05:02) (73 - 79)  BP: 166/77 (2017 05:02) (166/77 - 182/79)  BP(mean): 111 (2017 05:02) (110 - 120)  RR: 16 (2017 05:02) (16 - 18)  SpO2: 100% (2017 05:02) (96% - 100%)    REVIEW OF SYSTEMS:    CONSTITUTIONAL:  fatigue, generalized weakness  EYES: No eye pain, visual disturbances, or discharge  ENMT:  No difficulty hearing, tinnitus, vertigo; No sinus or throat pain  NECK: No pain or stiffness  BREASTS: No pain, masses, or nipple discharge  RESPIRATORY: No cough, wheezing, chills or hemoptysis; No shortness of breath  CARDIOVASCULAR: No chest pain, palpitations, dizziness, or leg swelling  GASTROINTESTINAL: No abdominal or epigastric pain. No nausea, vomiting, or hematemesis; No diarrhea or constipation. No melena or hematochezia.  GENITOURINARY: No dysuria, frequency, hematuria, or incontinence  NEUROLOGICAL: No headaches, memory loss, loss of strength, numbness, or tremors  SKIN: No itching, burning, rashes, or lesions   LYMPH NODES: No enlarged glands  ENDOCRINE: No heat or cold intolerance; No hair loss  MUSCULOSKELETAL: No joint pain or swelling; No muscle, back, or extremity pain  PSYCHIATRIC: No depression, anxiety, mood swings, or difficulty sleeping  HEME/LYMPH: No easy bruising, or bleeding gums  ALLERGY AND IMMUNOLOGIC: No hives or eczema      Physical Exam: elderly appearing AA woamn lying in bed, c/o weakness    HEENT: normocephalic/ atraumatic, anicteric    Neck: supple, negative JVD, negative carotid bruits,    Chest: CTA bilaterally, neg wheeze, rhonchi, rales, crackles, egophany    Cardiovascular: regular rate and rhythm, II/VI crescendo/decrescendo murmur    Abdomen: soft, non tender, negative rebound/guarding, mildly distended, normal bowel sounds, neg hepatosplenomegaly    Extremities: WWP, 1 + LE edema, erythematous, negative calf tenderness to palpation, negative Armando's sign    Neurologic Exam:    Alert and oriented to person, place, date/year, speech fluent w/o dysarthria, repetition intact, comprehension intact,     Cranial Nerves:     II:                       pupils equal, round and reactive to light, visual fields intact   III/ IV/VI:             extraocular movements intact, neg nystagmus, ptosis  V:                      facial sensation intact, V1-3 normal  VII:                     face symmetric, no droop, normal eye closure and smile  VIII:                    hearing intact to finger rub bilaterally  IX/ X:                  soft palate rise symmetrical  XI:                      head turning, shoulder shrug normal  XII:                     tongue midline    Motor Exam:    Bilateral UE:        4/5 /intrinsics                            4+/5 biceps/triceps/wrist extensors-flexors/deltoid                            negative pronator drift      Bilateral LE:        4+/5 hip flexors/adductors/abductors                            4+/5 quadriceps/hamstrings                            5/5 dorsiflexors/plantar flexors/invertors-evertors    Sensory: intact to LT/PP in all UE/LE dermatomes    DTR:        = biceps/     triceps/     brachioradialis                 = patella/   medial hamstring/    ankle                 neg clonus                 neg Babinski                 neg Hoffmans    Finger to Nose: wnl    Heel to Shin:      wnl    Rapid Alternating movements:  wnl    Joint Position Sense:  intact    Romberg: NT    Tandem Walking: NT    Gait:  NT        PM&R Impression:    1) deconditioned  2) gait dysfunction      Recommendations:    1) Physical therapy focusing on therapeutic exercises, bed mobility/transfer out of bed evaluation, progressive ambulation with assistive devices.    2) Disposition Plan: anticipate d/c home with home physical therapy, established home care services

## 2017-07-19 NOTE — PHYSICAL THERAPY INITIAL EVALUATION ADULT - CRITERIA FOR SKILLED THERAPEUTIC INTERVENTIONS
therapy frequency/rehab potential/functional limitations in following categories/anticipated discharge recommendation/risk reduction/prevention/impairments found

## 2017-07-19 NOTE — CONSULT NOTE ADULT - ASSESSMENT
CKD stage 4. Hypocalcemia and hypomagnesemia. Anemia partially related to renal disease. HTN uncontrolled. Acidosis.     Suggest:    1. Follow SCr.  2. Keep K > 2 and replace orally with magnesium oxide 400 bid.  3. Calcium acetate 2 tabs TID with meals for hyperphos and hypocalcemia and caco3 between meals. Aim corrected calcium > 7 - 8.  4. Start nahco3 650 bid for acidosis.  5. Continue coreg, hydralazine. Add torsemide 20 qd for HTN/overload.  6. Epo 10,000 U sq weekly (ordered x 1). Iron studies were adequate as outpatient.    Please call with any questions.    Hector Bryan MD, FACP, FASN | kidney.[facility]  Nephrology, Hypertension, and Internal Medicine  Mobile: [phone] (Daytime Hours Only)  Office/Answering Service: [phone]  Asst. Prof. of Medicine, [facility] - Nephrology at [address]  [address], [address], [place], [place]

## 2017-07-19 NOTE — PHYSICAL THERAPY INITIAL EVALUATION ADULT - PERTINENT HX OF CURRENT PROBLEM, REHAB EVAL
85F with HTN, HLD, CAD, CKD 4, SBO s/p resection in 10/2016, anemia, hypocalcemia, temporal arteritis recently admitted to [facility] about 1 month ago for abnormal lab values presents today for hypomagnesemia and hypokalemia.

## 2017-07-20 DIAGNOSIS — I16.0 HYPERTENSIVE URGENCY: ICD-10-CM

## 2017-07-20 DIAGNOSIS — I38 ENDOCARDITIS, VALVE UNSPECIFIED: ICD-10-CM

## 2017-07-20 LAB
ALBUMIN SERPL ELPH-MCNC: 2.7 G/DL — LOW (ref 3.3–5)
ALP SERPL-CCNC: 48 U/L — SIGNIFICANT CHANGE UP (ref 40–120)
ALT FLD-CCNC: <5 U/L — LOW (ref 10–45)
ANION GAP SERPL CALC-SCNC: 15 MMOL/L — SIGNIFICANT CHANGE UP (ref 5–17)
ANION GAP SERPL CALC-SCNC: 16 MMOL/L — SIGNIFICANT CHANGE UP (ref 5–17)
AST SERPL-CCNC: 9 U/L — LOW (ref 10–40)
BILIRUB SERPL-MCNC: 0.6 MG/DL — SIGNIFICANT CHANGE UP (ref 0.2–1.2)
BUN SERPL-MCNC: 52 MG/DL — HIGH (ref 7–23)
BUN SERPL-MCNC: 53 MG/DL — HIGH (ref 7–23)
CALCIUM SERPL-MCNC: 7.1 MG/DL — LOW (ref 8.4–10.5)
CALCIUM SERPL-MCNC: 7.2 MG/DL — LOW (ref 8.4–10.5)
CHLORIDE SERPL-SCNC: 108 MMOL/L — SIGNIFICANT CHANGE UP (ref 96–108)
CHLORIDE SERPL-SCNC: 108 MMOL/L — SIGNIFICANT CHANGE UP (ref 96–108)
CO2 SERPL-SCNC: 18 MMOL/L — LOW (ref 22–31)
CO2 SERPL-SCNC: 19 MMOL/L — LOW (ref 22–31)
CREAT SERPL-MCNC: 3 MG/DL — HIGH (ref 0.5–1.3)
CREAT SERPL-MCNC: 3 MG/DL — HIGH (ref 0.5–1.3)
GLUCOSE SERPL-MCNC: 83 MG/DL — SIGNIFICANT CHANGE UP (ref 70–99)
GLUCOSE SERPL-MCNC: 89 MG/DL — SIGNIFICANT CHANGE UP (ref 70–99)
HCT VFR BLD CALC: 28.1 % — LOW (ref 34.5–45)
HGB BLD-MCNC: 9.3 G/DL — LOW (ref 11.5–15.5)
MAGNESIUM SERPL-MCNC: 1.9 MG/DL — SIGNIFICANT CHANGE UP (ref 1.6–2.6)
MAGNESIUM SERPL-MCNC: 2 MG/DL — SIGNIFICANT CHANGE UP (ref 1.6–2.6)
MCHC RBC-ENTMCNC: 24.5 PG — LOW (ref 27–34)
MCHC RBC-ENTMCNC: 33.1 G/DL — SIGNIFICANT CHANGE UP (ref 32–36)
MCV RBC AUTO: 73.9 FL — LOW (ref 80–100)
PHOSPHATE SERPL-MCNC: 4 MG/DL — SIGNIFICANT CHANGE UP (ref 2.5–4.5)
PLATELET # BLD AUTO: 135 K/UL — LOW (ref 150–400)
POTASSIUM SERPL-MCNC: 3.8 MMOL/L — SIGNIFICANT CHANGE UP (ref 3.5–5.3)
POTASSIUM SERPL-MCNC: 3.9 MMOL/L — SIGNIFICANT CHANGE UP (ref 3.5–5.3)
POTASSIUM SERPL-SCNC: 3.8 MMOL/L — SIGNIFICANT CHANGE UP (ref 3.5–5.3)
POTASSIUM SERPL-SCNC: 3.9 MMOL/L — SIGNIFICANT CHANGE UP (ref 3.5–5.3)
PROT SERPL-MCNC: 5.4 G/DL — LOW (ref 6–8.3)
RBC # BLD: 3.8 M/UL — SIGNIFICANT CHANGE UP (ref 3.8–5.2)
RBC # FLD: 21.4 % — HIGH (ref 10.3–16.9)
SODIUM SERPL-SCNC: 141 MMOL/L — SIGNIFICANT CHANGE UP (ref 135–145)
SODIUM SERPL-SCNC: 143 MMOL/L — SIGNIFICANT CHANGE UP (ref 135–145)
WBC # BLD: 6.9 K/UL — SIGNIFICANT CHANGE UP (ref 3.8–10.5)
WBC # FLD AUTO: 6.9 K/UL — SIGNIFICANT CHANGE UP (ref 3.8–10.5)

## 2017-07-20 PROCEDURE — 99233 SBSQ HOSP IP/OBS HIGH 50: CPT | Mod: GC

## 2017-07-20 PROCEDURE — 99233 SBSQ HOSP IP/OBS HIGH 50: CPT

## 2017-07-20 PROCEDURE — 93010 ELECTROCARDIOGRAM REPORT: CPT

## 2017-07-20 RX ORDER — LABETALOL HCL 100 MG
10 TABLET ORAL ONCE
Qty: 0 | Refills: 0 | Status: COMPLETED | OUTPATIENT
Start: 2017-07-20 | End: 2017-07-20

## 2017-07-20 RX ORDER — ONDANSETRON 8 MG/1
4 TABLET, FILM COATED ORAL ONCE
Qty: 0 | Refills: 0 | Status: COMPLETED | OUTPATIENT
Start: 2017-07-20 | End: 2017-07-20

## 2017-07-20 RX ORDER — ONDANSETRON 8 MG/1
4 TABLET, FILM COATED ORAL ONCE
Qty: 0 | Refills: 0 | Status: DISCONTINUED | OUTPATIENT
Start: 2017-07-20 | End: 2017-07-21

## 2017-07-20 RX ORDER — PANTOPRAZOLE SODIUM 20 MG/1
40 TABLET, DELAYED RELEASE ORAL DAILY
Qty: 0 | Refills: 0 | Status: DISCONTINUED | OUTPATIENT
Start: 2017-07-20 | End: 2017-07-21

## 2017-07-20 RX ORDER — CARVEDILOL PHOSPHATE 80 MG/1
12.5 CAPSULE, EXTENDED RELEASE ORAL EVERY 12 HOURS
Qty: 0 | Refills: 0 | Status: DISCONTINUED | OUTPATIENT
Start: 2017-07-20 | End: 2017-07-21

## 2017-07-20 RX ADMIN — LORATADINE 10 MILLIGRAM(S): 10 TABLET ORAL at 12:38

## 2017-07-20 RX ADMIN — PANTOPRAZOLE SODIUM 40 MILLIGRAM(S): 20 TABLET, DELAYED RELEASE ORAL at 06:42

## 2017-07-20 RX ADMIN — CARVEDILOL PHOSPHATE 6.25 MILLIGRAM(S): 80 CAPSULE, EXTENDED RELEASE ORAL at 06:46

## 2017-07-20 RX ADMIN — Medication 1334 MILLIGRAM(S): at 17:07

## 2017-07-20 RX ADMIN — Medication 1334 MILLIGRAM(S): at 12:33

## 2017-07-20 RX ADMIN — CARVEDILOL PHOSPHATE 12.5 MILLIGRAM(S): 80 CAPSULE, EXTENDED RELEASE ORAL at 17:07

## 2017-07-20 RX ADMIN — Medication 75 MILLIGRAM(S): at 12:34

## 2017-07-20 RX ADMIN — Medication 10 MILLIGRAM(S): at 15:28

## 2017-07-20 RX ADMIN — Medication 75 MILLIGRAM(S): at 06:43

## 2017-07-20 RX ADMIN — PANTOPRAZOLE SODIUM 40 MILLIGRAM(S): 20 TABLET, DELAYED RELEASE ORAL at 16:37

## 2017-07-20 RX ADMIN — Medication 10 MILLIGRAM(S): at 00:35

## 2017-07-20 RX ADMIN — Medication 75 MILLIGRAM(S): at 17:06

## 2017-07-20 RX ADMIN — Medication 75 MILLIGRAM(S): at 23:43

## 2017-07-20 RX ADMIN — Medication 2 TABLET(S): at 06:42

## 2017-07-20 RX ADMIN — ONDANSETRON 4 MILLIGRAM(S): 8 TABLET, FILM COATED ORAL at 14:27

## 2017-07-20 RX ADMIN — Medication 10 MILLIGRAM(S): at 02:20

## 2017-07-20 RX ADMIN — Medication 650 MILLIGRAM(S): at 17:07

## 2017-07-20 RX ADMIN — TAMSULOSIN HYDROCHLORIDE 0.4 MILLIGRAM(S): 0.4 CAPSULE ORAL at 22:06

## 2017-07-20 RX ADMIN — Medication 5 MILLIGRAM(S): at 06:42

## 2017-07-20 RX ADMIN — Medication 1334 MILLIGRAM(S): at 09:49

## 2017-07-20 RX ADMIN — Medication 75 MILLIGRAM(S): at 01:09

## 2017-07-20 RX ADMIN — Medication 25 MILLIGRAM(S): at 08:02

## 2017-07-20 RX ADMIN — Medication 20 MILLIGRAM(S): at 09:49

## 2017-07-20 RX ADMIN — Medication 650 MILLIGRAM(S): at 06:42

## 2017-07-20 NOTE — PROVIDER CONTACT NOTE (OTHER) - BACKGROUND
Admitted with electrolyte imbalance and decreased appetite.  Patient hypertensive upon admission and while on 7 lach.  As per RN Marilou on 7 lach, goal was to maintain SBP < 180

## 2017-07-20 NOTE — PROVIDER CONTACT NOTE (OTHER) - SITUATION
Patient transferred from [facility]. Upon arrival vitals: /71 (right arm) 200/83 (left arm), HR 75, RR 17, T 97.8, O2 98.  Patient denies headache or any other s/s of increased BP

## 2017-07-20 NOTE — PROGRESS NOTE ADULT - PROBLEM SELECTOR PLAN 2
Pt found to have Ca 5.0 and Mg 0.5 on outpatient labs despite po supplementation at home due to sizeable small bowel resection. Today Ca 7.1, corrected to 8.2. Mg 2.0. Currently on CaCO3 1250mg TID, calcium acetate 1334mg TID. EKG without changes thus far.  -Per Dr. Bryan note, will stop calcium carbonate and continue calcium acetate.  -Goal Ca>8, Mg>2  -Will continue bicarb with goal of HCO3 > 20 per Dr. Bryan  -Will continue to follow serum Cr  -Continue with daily EKGs to monitor for QT prolongation  -Nutrition consult Pt found to have Ca 5.0 and Mg 0.5 on outpatient labs despite po supplementation at home due to sizeable small bowel resection. Today Ca 7.1, corrected to 8.2. Mg 2.0. Currently on CaCO3 1250mg TID, calcium acetate 1334mg TID. EKG without changes thus far.  -Per Dr. Bryan note, will stop calcium carbonate and continue calcium acetate.  -Goal Ca>8, Mg>2  -Will continue bicarb with goal of HCO3 > 20 per Dr. Bryan  -Will continue to follow serum Cr  -Continue with daily EKGs to monitor for QT prolongation  -Nutrition consult    #Vomiting -- pt with NBNB emesis x 2 days after eating turkey burger, non-toxic appearing with no fevers, no recent sick contacts, no abdominal pain. Able to tolerate po. May be related to small bowel resection and Ca/Mg supplementation.  -Zofran given for nausea today  -Will continue to monitor

## 2017-07-20 NOTE — PROGRESS NOTE ADULT - PROBLEM SELECTOR PLAN 6
Pt with history of temporal arteritis, blind in L eye. On home regimen of prednisone 5mg.  -c/w home dose of prednisone

## 2017-07-20 NOTE — PROGRESS NOTE ADULT - PROBLEM SELECTOR PLAN 8
Pt with history of SBO s/p small bowel resection in Oct 2016. Pt reports wt loss after procedure, now stable. No dietary changes at home other than small meals. States that she has rare loose stools.  -Monitor for diarrhea

## 2017-07-20 NOTE — PROGRESS NOTE ADULT - SUBJECTIVE AND OBJECTIVE BOX
INTERVAL HPI/OVERNIGHT EVENTS:    OVERNIGHT: No overnight events. Hypertensive to 240's, vomited 3 times, received labetalol 10mg x2, BP then in 180 systolic   SUBJECTIVE: Patient seen and examined at bedside. No initial complaints. Later complains of abdominal pain. No nausea, vomiting in the morning.     ROS:  CV: Denies chest pain  Resp: Denies SOB  GI: see HPI  : Denies dysuria  ID: Denies fevers, chills  MSK: Denies joint pain     OBJECTIVE:    VITAL SIGNS:  ICU Vital Signs Last 24 Hrs  T(C): 36.4 (2017 05:24), Max: 37.1 (2017 21:38)  T(F): 97.6 (2017 05:24), Max: 98.7 (2017 21:38)  HR: 64 (2017 06:12) (64 - 94)  BP: 161/77 (2017 06:12) (158/72 - 201/94)  BP(mean): 111 (2017 06:12) (104 - 135)  ABP: --  ABP(mean): --  RR: 16 (2017 06:12) (16 - 18)  SpO2: 100% (2017 18:32) (99% - 100%)        07-19 @ 07:01  -  07-20 @ 07:00  --------------------------------------------------------  IN: 0 mL / OUT: 350 mL / NET: -350 mL      CAPILLARY BLOOD GLUCOSE  129 (2017 07:04)          PHYSICAL EXAM:    General: NAD, comfortable  HEENT: NCAT, PERRL, clear conjunctiva, no scleral icterus  Neck: supple, no JVD  Respiratory: CTA b/l, no wheezing, rhonchi, rales  Cardiovascular: RRR, normal S1S2, no M/R/G  Abdomen: soft, NT/ND, bowel sounds in all four quadrants, no palpable masses  Extremities: WWP, no clubbing, cyanosis,  mild edema, erythematous rash on bilateral lower extremities   Neuro: grossly intact     MEDICATIONS:  MEDICATIONS  (STANDING):  carvedilol 6.25 milliGRAM(s) Oral every 12 hours  loratadine 10 milliGRAM(s) Oral daily  tamsulosin 0.4 milliGRAM(s) Oral at bedtime  pantoprazole    Tablet 40 milliGRAM(s) Oral before breakfast  predniSONE   Tablet 5 milliGRAM(s) Oral daily  calcium carbonate 1250 mG (OsCal) 2 Tablet(s) Oral every 8 hours  calcium acetate 1334 milliGRAM(s) Oral three times a day with meals  epoetin suhas Injectable 65835 Unit(s) SubCutaneous once  hydrALAZINE 25 milliGRAM(s) Oral once  hydrALAZINE 75 milliGRAM(s) Oral four times a day  sodium bicarbonate 650 milliGRAM(s) Oral two times a day  torsemide 20 milliGRAM(s) Oral daily    MEDICATIONS  (PRN):  acetaminophen   Tablet. 650 milliGRAM(s) Oral every 6 hours PRN Moderate Pain (4 - 6)      ALLERGIES:  Allergies    angiotensin converting enzyme inhibitors (Angioedema)  aspirin (Hives; Rash)  lactose (Diarrhea)  penicillin (Angioedema)  Seafood (Short breath)  shellfish (Short breath)  spinach (Unknown)    Intolerances        LABS:                        8.8    6.0   )-----------( 143      ( 2017 18:46 )             26.3     07-    141  |  108  |  50<H>  ----------------------------<  91  4.3   |  16<L>  |  3.10<H>    Ca    6.9<L>      2017 18:46  Phos  5.3       Mg     2.1         TPro  5.7<L>  /  Alb  2.8<L>  /  TBili  0.4  /  DBili  x   /  AST  12  /  ALT  <5<L>  /  AlkPhos  48  07-18    PT/INR - ( 2017 12:47 )   PT: 14.6 sec;   INR: 1.31          PTT - ( 2017 12:47 )  PTT:38.4 sec  Urinalysis Basic - ( 2017 14:29 )    Color: Yellow / Appearance: Clear / S.010 / pH: x  Gluc: x / Ketone: NEGATIVE  / Bili: NEGATIVE / Urobili: 0.2 E.U./dL   Blood: x / Protein: NEGATIVE mg/dL / Nitrite: NEGATIVE   Leuk Esterase: NEGATIVE / RBC: x / WBC x   Sq Epi: x / Non Sq Epi: x / Bacteria: x        RADIOLOGY & ADDITIONAL TESTS: Reviewed. Hospital Course:    PI: 85F with HTN, HLD, CAD, CKD 4, SBO s/p resection in 10/2016, anemia, hypocalcemia, temporal arteritis recently admitted to [facility] about 1 month ago for abnormal lab values admitted for hypomagnesemia and hypocalcemia . Saw Dr. Bryan Monday for the first time abnormal lab values reported as Ca 5.5 and Mag <0.5 and was told to present to the ED for admission. 1 month ago had 3 days of lethargy prior to coming to the hospital for similar lab values. She is on home supplement of calcium and magnesium, but had a large small bowel resection and has poor absorption. Patient was largely asymptomatic the pervious few days except for some fatigue. Patient received 5mg Ca first day of admission and several supplements of magnesium and then switched to oral calcium. Calcium and magnesium corrected. Patient also having difficult to control hypertension, systolic 220's night of . Patient was treated with labetalol and hydralazine dosing was increased from 50 TID to 75 QID and torsemide was switched from every other day to QD. BP now in 180's systolic. Patient also anemic to 6.7 on CBC on admission. Baseline 8-10. Repeat CBC showed 7.3, but patient already started on 1 unit PRBC. Hgb now in 9's. Guaiac positive and previous gastric ulcer, but PCP recommended just Protonix PPX and not perusing work up because recently worked up. Patient stable for transfer to [facility] medical service.         INTERVAL HPI/OVERNIGHT EVENTS:    OVERNIGHT: No overnight events. Hypertensive to 240's, vomited 3 times, received labetalol 10mg x2, BP then in 180 systolic   SUBJECTIVE: Patient seen and examined at bedside. No initial complaints. Later complains of abdominal pain. No nausea, vomiting in the morning.     ROS:  CV: Denies chest pain  Resp: Denies SOB  GI: see HPI  : Denies dysuria  ID: Denies fevers, chills  MSK: Denies joint pain     OBJECTIVE:    VITAL SIGNS:  ICU Vital Signs Last 24 Hrs  T(C): 36.4 (2017 05:24), Max: 37.1 (2017 21:38)  T(F): 97.6 (2017 05:24), Max: 98.7 (2017 21:38)  HR: 64 (2017 06:12) (64 - 94)  BP: 161/77 (2017 06:12) (158/72 - 201/94)  BP(mean): 111 (2017 06:12) (104 - 135)  ABP: --  ABP(mean): --  RR: 16 (2017 06:12) (16 - 18)  SpO2: 100% (2017 18:32) (99% - 100%)        07-19 @ 07:01  -  - @ 07:00  --------------------------------------------------------  IN: 0 mL / OUT: 350 mL / NET: -350 mL      CAPILLARY BLOOD GLUCOSE  129 (2017 07:04)          PHYSICAL EXAM:    General: NAD, comfortable  HEENT: NCAT, PERRL, clear conjunctiva, no scleral icterus  Neck: supple, no JVD  Respiratory: CTA b/l, no wheezing, rhonchi, rales  Cardiovascular: RRR, normal S1S2, no M/R/G  Abdomen: soft, NT/ND, bowel sounds in all four quadrants, no palpable masses  Extremities: WWP, no clubbing, cyanosis,  mild edema, erythematous rash on bilateral lower extremities   Neuro: grossly intact     MEDICATIONS:  MEDICATIONS  (STANDING):  carvedilol 6.25 milliGRAM(s) Oral every 12 hours  loratadine 10 milliGRAM(s) Oral daily  tamsulosin 0.4 milliGRAM(s) Oral at bedtime  pantoprazole    Tablet 40 milliGRAM(s) Oral before breakfast  predniSONE   Tablet 5 milliGRAM(s) Oral daily  calcium carbonate 1250 mG (OsCal) 2 Tablet(s) Oral every 8 hours  calcium acetate 1334 milliGRAM(s) Oral three times a day with meals  epoetin suhas Injectable 98677 Unit(s) SubCutaneous once  hydrALAZINE 25 milliGRAM(s) Oral once  hydrALAZINE 75 milliGRAM(s) Oral four times a day  sodium bicarbonate 650 milliGRAM(s) Oral two times a day  torsemide 20 milliGRAM(s) Oral daily    MEDICATIONS  (PRN):  acetaminophen   Tablet. 650 milliGRAM(s) Oral every 6 hours PRN Moderate Pain (4 - 6)      ALLERGIES:  Allergies    angiotensin converting enzyme inhibitors (Angioedema)  aspirin (Hives; Rash)  lactose (Diarrhea)  penicillin (Angioedema)  Seafood (Short breath)  shellfish (Short breath)  spinach (Unknown)    Intolerances        LABS:                        8.8    6.0   )-----------( 143      ( 2017 18:46 )             26.3     -    141  |  108  |  50<H>  ----------------------------<  91  4.3   |  16<L>  |  3.10<H>    Ca    6.9<L>      2017 18:46  Phos  5.3       Mg     2.1         TPro  5.7<L>  /  Alb  2.8<L>  /  TBili  0.4  /  DBili  x   /  AST  12  /  ALT  <5<L>  /  AlkPhos  48  -18    PT/INR - ( 2017 12:47 )   PT: 14.6 sec;   INR: 1.31          PTT - ( 2017 12:47 )  PTT:38.4 sec  Urinalysis Basic - ( 2017 14:29 )    Color: Yellow / Appearance: Clear / S.010 / pH: x  Gluc: x / Ketone: NEGATIVE  / Bili: NEGATIVE / Urobili: 0.2 E.U./dL   Blood: x / Protein: NEGATIVE mg/dL / Nitrite: NEGATIVE   Leuk Esterase: NEGATIVE / RBC: x / WBC x   Sq Epi: x / Non Sq Epi: x / Bacteria: x        RADIOLOGY & ADDITIONAL TESTS: Reviewed.

## 2017-07-20 NOTE — PROGRESS NOTE ADULT - SUBJECTIVE AND OBJECTIVE BOX
Physical Medicine and Rehabilitation Progress Note:    Patient is a 85y old  Female who presents with a chief complaint of electrolyte abnormalities (18 Jul 2017 21:47)      HPI:  HPI: 85F with HTN, HLD, CAD, CKD 4, SBO s/p resection in 10/2016, anemia, hypocalcemia, temporal arteritis recently admitted to [facility] about 1 month ago for abnormal lab values presents today for hypomagnesemia and hypokalemia. As per patient, she is new to Dr Bryan whom she saw for the first time yesterday and had routine lab work drawn. Lab work returned with abnormal lab values reported as Ca 5.5 and Mag <0.5 and was told to present to the ED for admission. 1 month ago had 3 days of lethargy prior to coming to the hospital. This episode patient felt has had some recent lethargy and dizziness this AM. Patient also complained of some SOB and diarrhea 1 week ago which has now resolved. She also has a bilateral anterior lower leg rash that started this past Saturday that is asymptomatic. She is compliant with her home medications. She denies n/v, CP, SOB, bloody stools, or melena.     In the ED lab work was repeated, patient given Calcium Carbonate 1250 mg + Vitamin D, Calcium Gluconate 2g IVPB, Magnesium Ox 800 x1 and Magnesium Sulfate 2g IVPB. EKG showed NSR (18 Jul 2017 21:47)                            9.3    6.9   )-----------( 135      ( 20 Jul 2017 07:36 )             28.1       07-20    143  |  108  |  53<H>  ----------------------------<  89  3.8   |  19<L>  |  3.00<H>    Ca    7.2<L>      20 Jul 2017 13:03  Phos  4.0     07-20  Mg     1.9     07-20    TPro  5.4<L>  /  Alb  2.7<L>  /  TBili  0.6  /  DBili  x   /  AST  9<L>  /  ALT  <5<L>  /  AlkPhos  48  07-20    Vital Signs Last 24 Hrs  T(C): 36.6 (20 Jul 2017 16:01), Max: 37.1 (19 Jul 2017 21:38)  T(F): 97.9 (20 Jul 2017 16:01), Max: 98.7 (19 Jul 2017 21:38)  HR: 71 (20 Jul 2017 16:01) (64 - 80)  BP: 185/69 (20 Jul 2017 16:01) (161/77 - 200/83)  BP(mean): 124 (20 Jul 2017 12:18) (110 - 130)  RR: 17 (20 Jul 2017 16:01) (16 - 18)  SpO2: 99% (20 Jul 2017 16:01) (98% - 100%)    MEDICATIONS  (STANDING):  loratadine 10 milliGRAM(s) Oral daily  tamsulosin 0.4 milliGRAM(s) Oral at bedtime  predniSONE   Tablet 5 milliGRAM(s) Oral daily  calcium acetate 1334 milliGRAM(s) Oral three times a day with meals  epoetin suhas Injectable 05082 Unit(s) SubCutaneous once  hydrALAZINE 75 milliGRAM(s) Oral four times a day  sodium bicarbonate 650 milliGRAM(s) Oral two times a day  torsemide 20 milliGRAM(s) Oral daily  pantoprazole  Injectable 40 milliGRAM(s) IV Push daily  carvedilol 12.5 milliGRAM(s) Oral every 12 hours    MEDICATIONS  (PRN):  acetaminophen   Tablet. 650 milliGRAM(s) Oral every 6 hours PRN Moderate Pain (4 - 6)  ondansetron Injectable 4 milliGRAM(s) IV Push once PRN Nausea and/or Vomiting    Currently Undergoing Physical Therapy at bedside.    Initial Functional Status Assessment:    Previous Level of Function:   · Ambulation Skills	needs device and assist	  · Transfer Skills	needs device and assist	  · ADL Skills	needs device and assist	  · Additional Comments	Pt states that she lives alone in elevator access [facility] w/ 2 steps to enter HR on one side. States that her daughter also lives in the building a floor below her and that she has a HHA 5hrs/day 5x/wk. Uses a RW for ambulation. States that she is blind in the L eye	    Cognitive Status Examination:   · Orientation	oriented to person, place, time and situation	  · Level of Consciousness	alert	  · Follows Commands and Answers Questions	100% of the time	  · Personal Safety and Judgment	intact	    Range of Motion Exam:   · Range of Motion Examination	no ROM deficits were identified	    Manual Muscle Testing:   · Manual Muscle Testing Results	grossly at least 3+/5 2/2 ability to perform functional mob against gravity	    Bed Mobility: Rolling/Turning:   · Level of Preston	independent	    Bed Mobility: Supine to Sit:   · Level of [place]	independent	  · Assistive Device	bed rails	    Transfer: Sit to Stand:   · Level of [place]	contact guard	  · Physical Assist/Nonphysical Assist	1 person assist; nonverbal cues (demo/gestures); verbal cues	  · Assistive Device	rolling walker	    Transfer: Stand to Sit:   · Level of Preston	contact guard	  · Physical Assist/Nonphysical Assist	1 person assist; nonverbal cues (demo/gestures); verbal cues	  · Assistive Device	rolling walker	    Sit/Stand Transfer Safety Analysis:   · Transfer Safety Concerns Noted	decreased balance during turns; decreased step length	  · Impairments Contributing to Impaired Transfers	impaired balance; narrow base of support; decreased strength	    Gait Skills:   · Level of [place]	contact guard; minimum assist (75% patients effort)	  · Physical Assist/Nonphysical Assist	1 person assist; nonverbal cues (demo/gestures); verbal cues	  · Assistive Device	rolling walker	  · Gait Distance	50 feet; x2	    Gait Analysis:   · Gait Pattern Used	3-point gait	  · Gait Deviations Noted	decreased mitchell; increased time in double stance; decreased step length	  · Impairments Contributing to Gait Deviations	impaired balance; narrow base of support; decreased strength	    Stair Negotiation:   · Level of Preston	minimum assist (75% patients effort)	  · Physical Assist/Nonphysical Assist	1 person assist; verbal cues; nonverbal cues (demo/gestures)	  · Assistive Device	BUE holding R handrail	  · Stair Pattern	step to step	  · Number of Stairs	2	    Balance Skills Assessment:   · Sitting Balance: Static	normal balance	  · Sitting Balance: Dynamic	good balance	  · Sit-to-Stand Balance	fair balance	  · Standing Balance: Static	good balance	  · Standing Balance: Dynamic	fair balance	  · Systems Impairment Contributing to Balance Disturbance	musculoskeletal	  · Identified Impairments Contributing to Balance Disturbance	decreased strength	    Sensory Examination:   Sensory Examination:  Grossly Intact:   · Gross Sensory Examination	Grossly Intact	      Clinical Impressions:   · Criteria for Skilled Therapeutic Interventions	impairments found; functional limitations in following categories; risk reduction/prevention; rehab potential; therapy frequency; anticipated discharge recommendation	  · Impairments Found (describe specific impairments)	aerobic capacity/endurance; gait, locomotion, and balance; muscle strength; poor safety awareness	  · Functional Limitations in Following Categories (describe specific limitations)	self-care; home management; community/leisure	  · Risk Reduction/Prevention (Describe Specific Areas of risk reduction/prevention)	risk factors	  · Risk Areas	fall; safety awareness	  · Rehab Potential	good, to achieve stated therapy goals	  · Therapy Frequency	2-3x/week	    PM&R Impression: as above    Disposition Plan Recommendations: d/c home, home physical therapy, home care services

## 2017-07-20 NOTE — PROGRESS NOTE ADULT - PROBLEM SELECTOR PLAN 6
Patient legs erythematous since Saturday. No pain, itchiness, non-tender. nonconsistent with cellulitis   -monitor for change in status

## 2017-07-20 NOTE — PROGRESS NOTE ADULT - SUBJECTIVE AND OBJECTIVE BOX
CC: ELECTROLYTE IMBALANCE    INTERVAL HISTORY:    ·	Hypomagnesemia improved.  ·	Hypocalcemia improved.  HTN better controlled.    ROS: No chest pain. No shortness of breath. No nausea.    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  ENT: oral mucosa moist, no pallor/cyanosis.  Neck: no JVD visible.  Cardiac: no rubs. no murmurs. Extremities: PITTING ANKLES EDEMA  Skin: no rashes.  Extremities (digits): no clubbing or cyanosis.  Respratory effort: no access muscle use. Lungs: CLEAR TO AUSCULTATION.  Abdomen: soft. nontender. no masses.  Psych affect: not depressed. Orientation: person, place, situation.     MEDICATIONS  (STANDING):  carvedilol 6.25 milliGRAM(s) Oral every 12 hours  loratadine 10 milliGRAM(s) Oral daily  tamsulosin 0.4 milliGRAM(s) Oral at bedtime  pantoprazole    Tablet 40 milliGRAM(s) Oral before breakfast  predniSONE   Tablet 5 milliGRAM(s) Oral daily  calcium carbonate 1250 mG (OsCal) 2 Tablet(s) Oral every 8 hours  calcium acetate 1334 milliGRAM(s) Oral three times a day with meals  epoetin suhas Injectable 40692 Unit(s) SubCutaneous once  hydrALAZINE 75 milliGRAM(s) Oral four times a day  sodium bicarbonate 650 milliGRAM(s) Oral two times a day  ondansetron Injectable 4 milliGRAM(s) IV Push once  torsemide 20 milliGRAM(s) Oral daily    MEDICATIONS  (PRN):  acetaminophen   Tablet. 650 milliGRAM(s) Oral every 6 hours PRN Moderate Pain (4 - 6)      DATA:  141    |  108    |  52<H>  ----------------------------<  83     Ca:7.1<L> (2017 07:36)  3.9     |  18<L>  |  3.00<H>      eGFR if Non : 14 <L>  eGFR if : 16 <L>    TPro  5.7 g/dL<L>  /  Alb  2.8 g/dL<L>  /  TBili  0.4 mg/dL  /  DBili  x      /  AST  12 U/L  /  ALT  <5 U/L<L>  /  AlkPhos  48 U/L  2017 13:40                        9.3<L>  6.9   )-----------( 135<L>    ( 2017 07:36 )             28.1<L>    Phos:-- M.0 mg/dL PTH:-- Uric acid:-- Serum Osm:--  Ferritin:-- Iron:-- TIBC:-- Tsat:--  B12:-- TSH:-- ( @ 07:36)    Urinalysis Basic - ( 2017 14:29 )  Color: Yellow / Appearance: Clear / S.010 / pH: x  Gluc: x / Ketone: NEGATIVE  / Bili: NEGATIVE / Urobili: 0.2 E.U./dL   Blood: x / Protein: NEGATIVE mg/dL / Nitrite: NEGATIVE   Leuk Esterase: NEGATIVE / RBC: x / WBC x   Sq Epi: x / Non Sq Epi: x / Bacteria: x CC: ELECTROLYTE IMBALANCE    INTERVAL HISTORY:    ·	Hypomagnesemia improved.  ·	Hypocalcemia improved.  ·	HTN better controlled.    ROS: No chest pain. No shortness of breath. No nausea.    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  ENT: oral mucosa moist, no pallor/cyanosis.  Neck: no JVD visible.  Cardiac: no rubs. no murmurs. Extremities: PITTING ANKLES EDEMA  Skin: no rashes.  Extremities (digits): no clubbing or cyanosis.  Respratory effort: no access muscle use. Lungs: CLEAR TO AUSCULTATION.  Abdomen: soft. nontender. no masses.  Psych affect: not depressed. Orientation: person, place, situation.     MEDICATIONS  (STANDING):  carvedilol 6.25 milliGRAM(s) Oral every 12 hours  loratadine 10 milliGRAM(s) Oral daily  tamsulosin 0.4 milliGRAM(s) Oral at bedtime  pantoprazole    Tablet 40 milliGRAM(s) Oral before breakfast  predniSONE   Tablet 5 milliGRAM(s) Oral daily  calcium carbonate 1250 mG (OsCal) 2 Tablet(s) Oral every 8 hours  calcium acetate 1334 milliGRAM(s) Oral three times a day with meals  epoetin suhas Injectable 43618 Unit(s) SubCutaneous once  hydrALAZINE 75 milliGRAM(s) Oral four times a day  sodium bicarbonate 650 milliGRAM(s) Oral two times a day  ondansetron Injectable 4 milliGRAM(s) IV Push once  torsemide 20 milliGRAM(s) Oral daily    MEDICATIONS  (PRN):  acetaminophen   Tablet. 650 milliGRAM(s) Oral every 6 hours PRN Moderate Pain (4 - 6)      DATA:  141    |  108    |  52<H>  ----------------------------<  83     Ca:7.1<L> (2017 07:36)  3.9     |  18<L>  |  3.00<H>      eGFR if Non : 14 <L>  eGFR if : 16 <L>    TPro  5.7 g/dL<L>  /  Alb  2.8 g/dL<L>  /  TBili  0.4 mg/dL  /  DBili  x      /  AST  12 U/L  /  ALT  <5 U/L<L>  /  AlkPhos  48 U/L  2017 13:40                        9.3<L>  6.9   )-----------( 135<L>    ( 2017 07:36 )             28.1<L>    Phos:-- M.0 mg/dL PTH:-- Uric acid:-- Serum Osm:--  Ferritin:-- Iron:-- TIBC:-- Tsat:--  B12:-- TSH:-- ( @ 07:36)    Urinalysis Basic - ( 2017 14:29 )  Color: Yellow / Appearance: Clear / S.010 / pH: x  Gluc: x / Ketone: NEGATIVE  / Bili: NEGATIVE / Urobili: 0.2 E.U./dL   Blood: x / Protein: NEGATIVE mg/dL / Nitrite: NEGATIVE   Leuk Esterase: NEGATIVE / RBC: x / WBC x   Sq Epi: x / Non Sq Epi: x / Bacteria: x

## 2017-07-20 NOTE — PROGRESS NOTE ADULT - PROBLEM SELECTOR PLAN 4
Pt with CKD stage 4, able to urinate. Cr 3.0 (7/20), previously 4.0 in 5/2017.  -f/u BMPs  -avoid nephrotoxic medications  -Renal diet as below

## 2017-07-20 NOTE — PROGRESS NOTE ADULT - PROBLEM SELECTOR PLAN 3
Pt with Hgb <7 during this admission, s/p 1U PRBC, now with Hgb 9.3 (7/20). Per PCP Dr. Hoyt, pt's baseline is 8-10. Pt has had gastric ulcer in the past but had recent workup so no need for GI work up at present. Anemia more likely 2/2 CKD at present.  -Will trend CBC  -Protonix 40mg for GI PPX  -EPO managed by nephrology Pt with Hgb <7 during this admission, s/p 1U PRBC, now with Hgb 9.3 (7/20). Per PCP Dr. Hoyt, pt's baseline is 8-10. Pt has had gastric ulcer in the past but had recent workup so no need for GI work up at present. Anemia more likely 2/2 CKD and anemia of chronic disease given ferritin in 5/2017 >1200.  -Will trend CBC  -Protonix 40mg for GI PPX  -EPO managed by nephrology

## 2017-07-20 NOTE — PROGRESS NOTE ADULT - SUBJECTIVE AND OBJECTIVE BOX
7 WO TRANSFER ACCEPTANCE  Hospital Course    85F with HTN, HLD, CAD, CKD 4, SBO s/p resection in 10/2016, anemia, hypocalcemia, temporal arteritis  admitted to [facility] for hypocalcemia (5.5) and hypomagnesemia (<0.5) found at outpatient Nephrologist. Pt was largely asymptomatic apart from some fatigue. Pt was admitted to [facility] ~1 month ago with a similar presentation and found to be hypocalcemic and hypomagnesemic at that time. She takes calcium and magnesium supplementation at home, but had 60cm of small bowel removed and has poor absorption    She is on home supplement of calcium and magnesium, but had a large small bowel resection and has poor absorption. Patient was largely asymptomatic the pervious few days except for some fatigue. Patient received 5mg Ca first day of admission and several supplements of magnesium and then switched to oral calcium. Calcium and magnesium corrected. Patient also having difficult to control hypertension, systolic 220's night of 7/19. Patient was treated with labetalol and hydralazine dosing was increased from 50 TID to 75 QID and torsemide was switched from every other day to QD. BP now in 180's systolic. Patient also anemic to 6.7 on CBC on admission. Baseline 8-10. Repeat CBC showed 7.3, but patient already started on 1 unit PRBC. Hgb now in 9's. Guaiac positive and previous gastric ulcer, but PCP recommended just Protonix PPX and not perusing work up because recently worked up. Patient stable for transfer to [facility] medical service.     INTERVAL HPI/OVERNIGHT EVENTS:  Patient S&E at bedside. No o/n events, patient resting comfortably. No complaints at this time. Patient denies fever, chills, dizziness, weakness, CP, palpitations, SOB, cough, N/V/D/C, dysuria, changes in bowel movements, LE edema.    VITAL SIGNS:  T(F): 97.9 (07-20-17 @ 14:10)  HR: 68 (07-20-17 @ 12:18)  BP: 165/96 (07-20-17 @ 12:18)  RR: 18 (07-20-17 @ 12:18)  SpO2: 100% (07-20-17 @ 12:18)  Wt(kg): --    PHYSICAL EXAM:    Constitutional: WDWN, NAD,   Eyes: PERRL, EOMI, sclera non-icteric  Neck: supple, no masses, no JVD  Respiratory: CTA b/l, good air entry b/l, no wheezing, rhonchi, rales, with normal respiratory effort and no intercostal retractions  Cardiovascular: RRR, normal S1S2, no M/R/G  Gastrointestinal: soft, NTND, no masses palpable, BS normal in all four quadrants, no HSM  Extremities: WWP, no c/c/e  Neurological: AAOx3  Skin: Normal temperature    MEDICATIONS  (STANDING):  carvedilol 6.25 milliGRAM(s) Oral every 12 hours  loratadine 10 milliGRAM(s) Oral daily  tamsulosin 0.4 milliGRAM(s) Oral at bedtime  pantoprazole    Tablet 40 milliGRAM(s) Oral before breakfast  predniSONE   Tablet 5 milliGRAM(s) Oral daily  calcium acetate 1334 milliGRAM(s) Oral three times a day with meals  epoetin suhas Injectable 01026 Unit(s) SubCutaneous once  hydrALAZINE 75 milliGRAM(s) Oral four times a day  sodium bicarbonate 650 milliGRAM(s) Oral two times a day  torsemide 20 milliGRAM(s) Oral daily    MEDICATIONS  (PRN):  acetaminophen   Tablet. 650 milliGRAM(s) Oral every 6 hours PRN Moderate Pain (4 - 6)      Allergies    angiotensin converting enzyme inhibitors (Angioedema)  aspirin (Hives; Rash)  lactose (Diarrhea)  penicillin (Angioedema)  Seafood (Short breath)  shellfish (Short breath)  spinach (Unknown)    Intolerances        LABS:                        9.3    6.9   )-----------( 135      ( 20 Jul 2017 07:36 )             28.1     07-20    143  |  108  |  53<H>  ----------------------------<  89  3.8   |  19<L>  |  3.00<H>    Ca    7.2<L>      20 Jul 2017 13:03  Phos  4.0     07-20  Mg     1.9     07-20    TPro  5.4<L>  /  Alb  2.7<L>  /  TBili  0.6  /  DBili  x   /  AST  9<L>  /  ALT  <5<L>  /  AlkPhos  48  07-20          RADIOLOGY & ADDITIONAL TESTS:  Studies reviewed. 7 WO TRANSFER ACCEPTANCE  Hospital Course    85F with HTN, HLD, CAD, CKD 4, SBO s/p resection in 10/2016, anemia, hypocalcemia, temporal arteritis  admitted to [facility] for hypocalcemia (5.5) and hypomagnesemia (<0.5) found at outpatient Nephrologist. Pt was largely asymptomatic apart from some fatigue. Pt was admitted to [facility] ~1 month ago with a similar presentation and found to be hypocalcemic and hypomagnesemic at that time. She takes calcium and magnesium supplementation at home, but had 60cm of small bowel removed in 10/2016 and has poor absorption. At present, pt was initially admitted to [facility] where she received 5mg Ca first day of admission and several supplements of magnesium and then switched to oral calcium. Calcium and magnesium corrected. Patient also having difficult to control hypertension, systolic 220's night of 7/19. Patient was treated with labetalol and hydralazine dosing was increased from 50 TID to 75 QID and torsemide was switched from every other day to QD. BP now in 180's systolic. Patient also anemic to 6.7 on CBC on admission. Baseline 8-10. Repeat CBC showed 7.3, but patient already started on 1 unit PRBC. Hgb now in 9's. Guaiac positive and previous gastric ulcer, but PCP recommended just Protonix PPX and not perusing work up because recently worked up. Patient stable for transfer to [facility] medical service.     INTERVAL HPI/OVERNIGHT EVENTS:  Patient S&E at bedside. No o/n events, patient resting comfortably. No complaints at this time. Patient denies fever, chills, dizziness, weakness, CP, palpitations, SOB, cough, N/V/D/C, dysuria, changes in bowel movements, LE edema.    VITAL SIGNS:  T(F): 97.9 (07-20-17 @ 14:10)  HR: 68 (07-20-17 @ 12:18)  BP: 165/96 (07-20-17 @ 12:18)  RR: 18 (07-20-17 @ 12:18)  SpO2: 100% (07-20-17 @ 12:18)  Wt(kg): --    PHYSICAL EXAM:    Constitutional: WDWN, NAD,   Eyes: PERRL, EOMI, sclera non-icteric  Neck: supple, no masses, no JVD  Respiratory: CTA b/l, good air entry b/l, no wheezing, rhonchi, rales, with normal respiratory effort and no intercostal retractions  Cardiovascular: RRR, normal S1S2, no M/R/G  Gastrointestinal: soft, NTND, no masses palpable, BS normal in all four quadrants, no HSM  Extremities: WWP, no c/c/e  Neurological: AAOx3  Skin: Normal temperature    MEDICATIONS  (STANDING):  carvedilol 6.25 milliGRAM(s) Oral every 12 hours  loratadine 10 milliGRAM(s) Oral daily  tamsulosin 0.4 milliGRAM(s) Oral at bedtime  pantoprazole    Tablet 40 milliGRAM(s) Oral before breakfast  predniSONE   Tablet 5 milliGRAM(s) Oral daily  calcium acetate 1334 milliGRAM(s) Oral three times a day with meals  epoetin suhas Injectable 43735 Unit(s) SubCutaneous once  hydrALAZINE 75 milliGRAM(s) Oral four times a day  sodium bicarbonate 650 milliGRAM(s) Oral two times a day  torsemide 20 milliGRAM(s) Oral daily    MEDICATIONS  (PRN):  acetaminophen   Tablet. 650 milliGRAM(s) Oral every 6 hours PRN Moderate Pain (4 - 6)      Allergies    angiotensin converting enzyme inhibitors (Angioedema)  aspirin (Hives; Rash)  lactose (Diarrhea)  penicillin (Angioedema)  Seafood (Short breath)  shellfish (Short breath)  spinach (Unknown)    Intolerances        LABS:                        9.3    6.9   )-----------( 135      ( 20 Jul 2017 07:36 )             28.1     07-20    143  |  108  |  53<H>  ----------------------------<  89  3.8   |  19<L>  |  3.00<H>    Ca    7.2<L>      20 Jul 2017 13:03  Phos  4.0     07-20  Mg     1.9     07-20    TPro  5.4<L>  /  Alb  2.7<L>  /  TBili  0.6  /  DBili  x   /  AST  9<L>  /  ALT  <5<L>  /  AlkPhos  48  07-20          RADIOLOGY & ADDITIONAL TESTS:  Studies reviewed. 7 WO TRANSFER ACCEPTANCE  Hospital Course    85F with HTN, HLD, CAD, CKD 4, SBO s/p resection in 10/2016, anemia, hypocalcemia, temporal arteritis  admitted to [facility] for hypocalcemia (5.5) and hypomagnesemia (<0.5) found at outpatient Nephrologist. Pt was largely asymptomatic apart from some fatigue. Pt was admitted to [facility] ~1 month ago with a similar presentation and found to be hypocalcemic and hypomagnesemic at that time. She takes calcium and magnesium supplementation at home, but had 60cm of small bowel removed in 10/2016 and has poor absorption. At present, pt was initially admitted to [facility] where she received 5mg Ca first day of admission and several supplements of magnesium and then switched to oral calcium with correction of her calcium and magnesium. Patient also having difficult to control hypertension, systolic 220's night of 7/19. Patient was treated with labetalol and hydralazine dosing was increased from 50 TID to 75 QID and torsemide was switched from every other day to QD. BP now in 180's systolic. Patient also anemic to 6.7 on CBC on admission. Baseline 8-10. Repeat CBC showed 7.3, but patient already started on 1 unit PRBC. Hgb now in 9's. Guaiac positive and previous gastric ulcer, but PCP recommended just Protonix PPX and not perusing work up because recently worked up. Patient stable for transfer to [facility] medical service.     Upon transfer to [facility], patient was resting comfortably. She complained of nausea with two episodes of vomiting yesterday and 1 episode     INTERVAL HPI/OVERNIGHT EVENTS:  Patient S&E at bedside. No o/n events, patient resting comfortably. No complaints at this time. Patient denies fever, chills, dizziness, weakness, CP, palpitations, SOB, cough, N/V/D/C, dysuria, changes in bowel movements, LE edema.    VITAL SIGNS:  T(F): 97.9 (07-20-17 @ 14:10)  HR: 68 (07-20-17 @ 12:18)  BP: 165/96 (07-20-17 @ 12:18)  RR: 18 (07-20-17 @ 12:18)  SpO2: 100% (07-20-17 @ 12:18)  Wt(kg): --    PHYSICAL EXAM:    Constitutional: WDWN, NAD,   Eyes: PERRL, EOMI, sclera non-icteric  Neck: supple, no masses, no JVD  Respiratory: CTA b/l, good air entry b/l, no wheezing, rhonchi, rales, with normal respiratory effort and no intercostal retractions  Cardiovascular: RRR, normal S1S2, no M/R/G  Gastrointestinal: soft, NTND, no masses palpable, BS normal in all four quadrants, no HSM  Extremities: WWP, no c/c/e  Neurological: AAOx3  Skin: Normal temperature    MEDICATIONS  (STANDING):  carvedilol 6.25 milliGRAM(s) Oral every 12 hours  loratadine 10 milliGRAM(s) Oral daily  tamsulosin 0.4 milliGRAM(s) Oral at bedtime  pantoprazole    Tablet 40 milliGRAM(s) Oral before breakfast  predniSONE   Tablet 5 milliGRAM(s) Oral daily  calcium acetate 1334 milliGRAM(s) Oral three times a day with meals  epoetin suhas Injectable 71612 Unit(s) SubCutaneous once  hydrALAZINE 75 milliGRAM(s) Oral four times a day  sodium bicarbonate 650 milliGRAM(s) Oral two times a day  torsemide 20 milliGRAM(s) Oral daily    MEDICATIONS  (PRN):  acetaminophen   Tablet. 650 milliGRAM(s) Oral every 6 hours PRN Moderate Pain (4 - 6)      Allergies    angiotensin converting enzyme inhibitors (Angioedema)  aspirin (Hives; Rash)  lactose (Diarrhea)  penicillin (Angioedema)  Seafood (Short breath)  shellfish (Short breath)  spinach (Unknown)    Intolerances        LABS:                        9.3    6.9   )-----------( 135      ( 20 Jul 2017 07:36 )             28.1     07-20    143  |  108  |  53<H>  ----------------------------<  89  3.8   |  19<L>  |  3.00<H>    Ca    7.2<L>      20 Jul 2017 13:03  Phos  4.0     07-20  Mg     1.9     07-20    TPro  5.4<L>  /  Alb  2.7<L>  /  TBili  0.6  /  DBili  x   /  AST  9<L>  /  ALT  <5<L>  /  AlkPhos  48  07-20          RADIOLOGY & ADDITIONAL TESTS:  Studies reviewed. 7 WO TRANSFER ACCEPTANCE  Hospital Course    85F with HTN, HLD, CAD, CKD 4, SBO s/p resection in 10/2016, anemia, hypocalcemia, temporal arteritis  admitted to [facility] for hypocalcemia (5.5) and hypomagnesemia (<0.5) found at outpatient Nephrologist. Pt was largely asymptomatic apart from some fatigue. Pt was admitted to [facility] ~1 month ago with a similar presentation and found to be hypocalcemic and hypomagnesemic at that time. She takes calcium and magnesium supplementation at home, but had 60cm of small bowel removed in 10/2016 and has poor absorption. At present, pt was initially admitted to [facility] where she received 5mg Ca first day of admission and several supplements of magnesium and then switched to oral calcium with correction of her calcium and magnesium. Patient also having difficult to control hypertension, systolic 220's night of 7/19. Patient was treated with labetalol and hydralazine dosing was increased from 50 TID to 75 QID and torsemide was switched from every other day to QD. BP now in 180's systolic. Patient also anemic to 6.7 on CBC on admission. Baseline 8-10. Repeat CBC showed 7.3, but patient already started on 1 unit PRBC. Hgb now in 9's. Guaiac positive and previous gastric ulcer, but PCP recommended just Protonix PPX and not perusing work up because recently worked up. Patient stable for transfer to [facility] medical service.     Upon transfer to [facility], patient was resting comfortably. She complained of nausea with two episodes of vomiting yesterday and 1 episode this morning, clear, nonbloody non bilious. She was observed to have 1 episode of vomiting during this interview and received zofran. 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 No recent fevers, no sick contacts, no CP, SOB, dysuria or constipation/diarrhea.     Vital Signs Last 24 Hrs  T(C): 36.8 (20 Jul 2017 14:47), Max: 37.1 (19 Jul 2017 21:38)  T(F): 98.2 (20 Jul 2017 14:47), Max: 98.7 (19 Jul 2017 21:38)  HR: 76 (20 Jul 2017 14:47) (64 - 80)  BP: 200/83 (20 Jul 2017 15:07) (161/77 - 200/83)  BP(mean): 124 (20 Jul 2017 12:18) (110 - 130)  RR: 16 (20 Jul 2017 14:47) (16 - 18)  SpO2: 98% (20 Jul 2017 14:47) (98% - 100%)      PHYSICAL EXAM:    Constitutional: WDWN, thin  woman in NAD   Eyes: R pupil reactive to light, L pupil nonreactive, EOMI, sclera non-icteric, conjunctival hemorrhage inferior to iris in L eye  Neck: supple, no masses, no JVD  Respiratory: CTA b/l, good air entry b/l, no wheezing, rhonchi, rales, with normal respiratory effort and no intercostal retractions  Cardiovascular: regular, normal S1S2, II/VI systolic murmur heard diffusely   Gastrointestinal: soft, NTND, no masses palpable, BS normal in all four quadrants, no HSM  Extremities: WWP, no c/c/e  Neurological: AAOx3  Skin: Normal temperature    MEDICATIONS  (STANDING):  carvedilol 6.25 milliGRAM(s) Oral every 12 hours  loratadine 10 milliGRAM(s) Oral daily  tamsulosin 0.4 milliGRAM(s) Oral at bedtime  pantoprazole    Tablet 40 milliGRAM(s) Oral before breakfast  predniSONE   Tablet 5 milliGRAM(s) Oral daily  calcium acetate 1334 milliGRAM(s) Oral three times a day with meals  epoetin suhas Injectable 14177 Unit(s) SubCutaneous once  hydrALAZINE 75 milliGRAM(s) Oral four times a day  sodium bicarbonate 650 milliGRAM(s) Oral two times a day  torsemide 20 milliGRAM(s) Oral daily    MEDICATIONS  (PRN):  acetaminophen   Tablet. 650 milliGRAM(s) Oral every 6 hours PRN Moderate Pain (4 - 6)      Allergies    angiotensin converting enzyme inhibitors (Angioedema)  aspirin (Hives; Rash)  lactose (Diarrhea)  penicillin (Angioedema)  Seafood (Short breath)  shellfish (Short breath)  spinach (Unknown)    Intolerances        LABS:                        9.3    6.9   )-----------( 135      ( 20 Jul 2017 07:36 )             28.1     07-20    143  |  108  |  53<H>  ----------------------------<  89  3.8   |  19<L>  |  3.00<H>    Ca    7.2<L>      20 Jul 2017 13:03  Phos  4.0     07-20  Mg     1.9     07-20    TPro  5.4<L>  /  Alb  2.7<L>  /  TBili  0.6  /  DBili  x   /  AST  9<L>  /  ALT  <5<L>  /  AlkPhos  48  07-20          RADIOLOGY & ADDITIONAL TESTS:  Studies reviewed. 7 WO TRANSFER ACCEPTANCE  Hospital Course    85F with HTN, HLD, CAD, CKD 4, SBO s/p resection in 10/2016, anemia, hypocalcemia, temporal arteritis  admitted to [facility] for hypocalcemia (5.5) and hypomagnesemia (<0.5) found at outpatient Nephrologist. Pt was largely asymptomatic apart from some fatigue. Pt was admitted to [facility] ~1 month ago with a similar presentation and found to be hypocalcemic and hypomagnesemic at that time. She takes calcium and magnesium supplementation at home, but had 60cm of small bowel removed in 10/2016 and has poor absorption. At present, pt was initially admitted to [facility] where she received 5mg Ca first day of admission and several supplements of magnesium and then switched to oral calcium with correction of her calcium and magnesium. Patient also having difficult to control hypertension, systolic 220's night of 7/19. Patient was treated with labetalol and hydralazine dosing was increased from 50 TID to 75 QID and torsemide was switched from every other day to QD. BP now in 180's systolic. Patient also anemic to 6.7 on CBC on admission. Baseline 8-10. Repeat CBC showed 7.3, but patient already started on 1 unit PRBC. Hgb now in 9's. Guaiac positive and previous gastric ulcer, but PCP recommended just Protonix PPX and not perusing work up because recently worked up. Patient stable for transfer to [facility] medical service.     Upon transfer to [facility], patient was resting comfortably. She complained of nausea with two episodes of vomiting yesterday and 1 episode this morning, clear, nonbloody non bilious. Able to tolerate medications po and small amounts of food. She was observed to have 1 episode of vomiting during this interview and received zofran. 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 No recent fevers, no sick contacts, no CP, SOB, dysuria or constipation/diarrhea.     Vital Signs Last 24 Hrs  T(C): 36.8 (20 Jul 2017 14:47), Max: 37.1 (19 Jul 2017 21:38)  T(F): 98.2 (20 Jul 2017 14:47), Max: 98.7 (19 Jul 2017 21:38)  HR: 76 (20 Jul 2017 14:47) (64 - 80)  BP: 200/83 (20 Jul 2017 15:07) (161/77 - 200/83)  BP(mean): 124 (20 Jul 2017 12:18) (110 - 130)  RR: 16 (20 Jul 2017 14:47) (16 - 18)  SpO2: 98% (20 Jul 2017 14:47) (98% - 100%)      PHYSICAL EXAM:    Constitutional: WDWN, thin  woman in mild distress 2/2 vomiting, subsequently resolved and rest of exam completed  HEENT: R pupil reactive to light, L pupil nonreactive, EOMI, sclera non-icteric, conjunctival hemorrhage inferior to iris in L eye, supple, no masses, no JVD, negative Chvostek sign  Respiratory: CTA b/l, good air entry b/l, no wheezing, rhonchi, rales, with normal respiratory effort and no intercostal retractions  Cardiovascular: regular, normal S1S2, II/VI systolic murmur heard diffusely   Gastrointestinal: soft, NTND, no masses palpable, +BS in all four quadrants  Extremities: WWP, 2+ radial and DP pulses, 1-2+ pitting edema to the ankles, negative Trousseau sign  Neurological: AAOx3, CNII-XII grossly intact, gait stable with 1+ assistance  Skin: Normal temperature, no ulcers, erythematous macular rash on b/l lower legs    MEDICATIONS  (STANDING):  carvedilol 6.25 milliGRAM(s) Oral every 12 hours  loratadine 10 milliGRAM(s) Oral daily  tamsulosin 0.4 milliGRAM(s) Oral at bedtime  pantoprazole    Tablet 40 milliGRAM(s) Oral before breakfast  predniSONE   Tablet 5 milliGRAM(s) Oral daily  calcium acetate 1334 milliGRAM(s) Oral three times a day with meals  epoetin suhas Injectable 39103 Unit(s) SubCutaneous once  hydrALAZINE 75 milliGRAM(s) Oral four times a day  sodium bicarbonate 650 milliGRAM(s) Oral two times a day  torsemide 20 milliGRAM(s) Oral daily    MEDICATIONS  (PRN):  acetaminophen   Tablet. 650 milliGRAM(s) Oral every 6 hours PRN Moderate Pain (4 - 6)      Allergies    angiotensin converting enzyme inhibitors (Angioedema)  aspirin (Hives; Rash)  lactose (Diarrhea)  penicillin (Angioedema)  Seafood (Short breath)  shellfish (Short breath)  spinach (Unknown)    Intolerances      LABS:                        9.3    6.9   )-----------( 135      ( 20 Jul 2017 07:36 )             28.1     07-20    143  |  108  |  53<H>  ----------------------------<  89  3.8   |  19<L>  |  3.00<H>    Ca    7.2<L>      20 Jul 2017 13:03  Phos  4.0     07-20  Mg     1.9     07-20    TPro  5.4<L>  /  Alb  2.7<L>  /  TBili  0.6  /  DBili  x   /  AST  9<L>  /  ALT  <5<L>  /  AlkPhos  48  07-20        RADIOLOGY & ADDITIONAL TESTS:  Studies reviewed.

## 2017-07-20 NOTE — PROGRESS NOTE ADULT - PROBLEM SELECTOR PLAN 4
Continue home carvedilol 6.25mg BID, tamulosin 0.4mg QHS\  Increased hydralazine 50mg 4x a day because hypertensive BP still above 180 systolic   Continue home carvedilol 6.25mg BID, tamulosin 0.4mg QHS\  Increased hydralazine 75mg 4x a day because hypertensive  Torsemide 20mg once a day  -consider adding amlodipine

## 2017-07-20 NOTE — PROGRESS NOTE ADULT - PROBLEM SELECTOR PLAN 1
Pt with HTN on carvedilol 6.25mg BID at home, tamulosin 0.4mg QHS, hydralazine 50mg four times daily. Unknown baseline pressures, but currently with SBP 190s-200s though without HA, blurry vision or other signs of end organ dysfunction.  -c/w hydralazine 75mg four times a day (increased on [facility])  -c/w home carvedilol 6.25mg BID, tamulosin 0.4mg QHS  -c/w torsemide 20mg daily  -pushed 10mg labetalol x1  -will consider adding amlodipine or increasing carvedilol if blood pressures remain uncontrolled Pt with HTN on carvedilol 6.25mg BID at home, hydralazine 50mg four times daily. Unknown baseline pressures, but currently with SBP 190s-200s though without HA, blurry vision or other signs of end organ dysfunction.  -c/w hydralazine 75mg four times a day (increased on [facility])  -c/w home carvedilol 6.25mg BID, tamulosin 0.4mg QHS  -c/w torsemide 20mg daily  -pushed 10mg labetalol x1  -will consider adding amlodipine or increasing carvedilol if blood pressures remain uncontrolled Pt with HTN on carvedilol 6.25mg BID at home, hydralazine 50mg four times daily. Unknown baseline pressures, but currently with SBP 190s-200s though without HA, blurry vision or other signs of end organ dysfunction.  -c/w hydralazine 75mg four times a day (increased on [facility])  -c/w home carvedilol 6.25mg BID, tamulosin 0.4mg QHS  -c/w torsemide 20mg daily  -pushed 10mg labetalol x1  -Increased carvedilol to 12.5mg BID starting tonight  -will consider adding amlodipine if blood pressures remain uncontrolled

## 2017-07-20 NOTE — PROVIDER CONTACT NOTE (OTHER) - RECOMMENDATIONS
Patient should be on higher level of care until BP better maintained or increase standing medications

## 2017-07-20 NOTE — PROGRESS NOTE ADULT - PROBLEM SELECTOR PLAN 3
Patient with known CKD stage 4. She is able to produce urine, Cr 3.1 (7/19)  - f/u BMPs  - avoid nephrotoxic medications  - renal diet Patient with known CKD stage 4. She is able to produce urine, Cr 3.0 (7/20)  - f/u BMPs  - avoid nephrotoxic medications  - renal diet

## 2017-07-20 NOTE — PROGRESS NOTE ADULT - ATTENDING COMMENTS
Patient seen and examined with house-staff during bedside rounds.  Resident note read, including vitals, physical findings, laboratory data, and radiological reports.   Revisions included below.  Direct personal management at bed side and extensive interpretation of the data.  Plan was outlined and discussed in details with the housestaff.  Decision making of high complexity  the patient is clinically stable. The QT interval decreased correction altered calcium level.  The cretin is about the same. The antihypertensive medication where adjusted.  Patient has increase in blood pressure overnight which require adjustment of her regimen. Patient was transferred to the regular floor and develop an episode of increase blood pressure which he responded to  PRN Medication,   the blood pressure during the rest of the day is stable
Patient seen and examined with house-staff during bedside rounds.  Resident note read, including vitals, physical findings, laboratory data, and radiological reports.   Revisions included below.  Direct personal management at bed side and extensive interpretation of the data.  Plan was outlined and discussed in details with the housestaff.  Decision making of high complexity  The patient. The calcium level increase.. Increase calcium intake.

## 2017-07-20 NOTE — PROGRESS NOTE ADULT - PROBLEM SELECTOR PLAN 1
Patient found to have Ca 5.0 and Mg 0.5 although on supplementation at home due to large small bowel resection.   -7/19 AM: Ca 5.9 (corrected 6.5), Mg 2.4 s/p 5 grams of calcium, will receive 2 more grams IV calcium, f/u 6PM BPM, Mg  -replete Ca >8 and Mag >2  -EKG showing no QT prolongation Continue to monitor for QT prolongation with EKG daily  - Nutrition consult Patient found to have Ca 5.0 and Mg 0.5 although on supplementation at home due to large small bowel resection.   -7/20 AM: Ca 7.1 (corrected higher, but don't have recent albumin), Mg 2.0, on CaCO3 1250 TID, calcium acetate 1334mg TID, recheck lytes at 2PM  -replete Ca >8 and Mag >2  -EKG showing no QT prolongation Continue to monitor for QT prolongation with EKG daily  - Nutrition consult

## 2017-07-20 NOTE — PROGRESS NOTE ADULT - PROBLEM SELECTOR PLAN 2
Hgb 6.9 (7/19 AM) repeat 7.3, but CAD threshold of 8 for transfusion, received 1 unit of PRBC, will recheck CBC 6PM, will f/u  Spoke to PCP Dr. Hoyt. Baseline Hgb 8-10. Patient had gastric ulcer in the past, but worked up recently and recommended no GI work up, just protonix for PPX, believes its secondary to CKD, received transfusions recently   -start pantoprazole 40mg   -epo today, managed by renal Hgb 9.3 (7/20) s/p 1 unit, jessica recheck tomorrow   Spoke to PCP Dr. Hoyt. Baseline Hgb 8-10. Patient had gastric ulcer in the past, but worked up recently and recommended no GI work up, just protonix for PPX, believes its secondary to CKD, received transfusions recently   -start pantoprazole 40mg   -epo managed by renal

## 2017-07-21 ENCOUNTER — TRANSCRIPTION ENCOUNTER (OUTPATIENT)
Age: 82
End: 2017-07-21

## 2017-07-21 VITALS
OXYGEN SATURATION: 99 % | DIASTOLIC BLOOD PRESSURE: 66 MMHG | RESPIRATION RATE: 15 BRPM | HEART RATE: 68 BPM | SYSTOLIC BLOOD PRESSURE: 166 MMHG | TEMPERATURE: 98 F

## 2017-07-21 LAB
ALBUMIN SERPL ELPH-MCNC: 2.3 G/DL — LOW (ref 3.3–5)
ALP SERPL-CCNC: 42 U/L — SIGNIFICANT CHANGE UP (ref 40–120)
ALT FLD-CCNC: <5 U/L — LOW (ref 10–45)
ANION GAP SERPL CALC-SCNC: 15 MMOL/L — SIGNIFICANT CHANGE UP (ref 5–17)
AST SERPL-CCNC: 7 U/L — LOW (ref 10–40)
BILIRUB SERPL-MCNC: 0.5 MG/DL — SIGNIFICANT CHANGE UP (ref 0.2–1.2)
BUN SERPL-MCNC: 50 MG/DL — HIGH (ref 7–23)
CALCIUM SERPL-MCNC: 7.1 MG/DL — LOW (ref 8.4–10.5)
CHLORIDE SERPL-SCNC: 109 MMOL/L — HIGH (ref 96–108)
CO2 SERPL-SCNC: 16 MMOL/L — LOW (ref 22–31)
CREAT SERPL-MCNC: 2.9 MG/DL — HIGH (ref 0.5–1.3)
GLUCOSE SERPL-MCNC: 61 MG/DL — LOW (ref 70–99)
HCT VFR BLD CALC: 25.4 % — LOW (ref 34.5–45)
HGB BLD-MCNC: 8.3 G/DL — LOW (ref 11.5–15.5)
MAGNESIUM SERPL-MCNC: 1.7 MG/DL — SIGNIFICANT CHANGE UP (ref 1.6–2.6)
MCHC RBC-ENTMCNC: 24.6 PG — LOW (ref 27–34)
MCHC RBC-ENTMCNC: 32.7 G/DL — SIGNIFICANT CHANGE UP (ref 32–36)
MCV RBC AUTO: 75.4 FL — LOW (ref 80–100)
PHOSPHATE SERPL-MCNC: 4.2 MG/DL — SIGNIFICANT CHANGE UP (ref 2.5–4.5)
PLATELET # BLD AUTO: 117 K/UL — LOW (ref 150–400)
POTASSIUM SERPL-MCNC: 3.9 MMOL/L — SIGNIFICANT CHANGE UP (ref 3.5–5.3)
POTASSIUM SERPL-SCNC: 3.9 MMOL/L — SIGNIFICANT CHANGE UP (ref 3.5–5.3)
PROT SERPL-MCNC: 4.9 G/DL — LOW (ref 6–8.3)
RBC # BLD: 3.37 M/UL — LOW (ref 3.8–5.2)
RBC # FLD: 21.4 % — HIGH (ref 10.3–16.9)
SODIUM SERPL-SCNC: 140 MMOL/L — SIGNIFICANT CHANGE UP (ref 135–145)
WBC # BLD: 5.2 K/UL — SIGNIFICANT CHANGE UP (ref 3.8–10.5)
WBC # FLD AUTO: 5.2 K/UL — SIGNIFICANT CHANGE UP (ref 3.8–10.5)

## 2017-07-21 PROCEDURE — 86850 RBC ANTIBODY SCREEN: CPT

## 2017-07-21 PROCEDURE — 85730 THROMBOPLASTIN TIME PARTIAL: CPT

## 2017-07-21 PROCEDURE — P9016: CPT

## 2017-07-21 PROCEDURE — 36415 COLL VENOUS BLD VENIPUNCTURE: CPT

## 2017-07-21 PROCEDURE — 80048 BASIC METABOLIC PNL TOTAL CA: CPT

## 2017-07-21 PROCEDURE — 96375 TX/PRO/DX INJ NEW DRUG ADDON: CPT

## 2017-07-21 PROCEDURE — 97161 PT EVAL LOW COMPLEX 20 MIN: CPT

## 2017-07-21 PROCEDURE — 86901 BLOOD TYPING SEROLOGIC RH(D): CPT

## 2017-07-21 PROCEDURE — 83735 ASSAY OF MAGNESIUM: CPT

## 2017-07-21 PROCEDURE — 86923 COMPATIBILITY TEST ELECTRIC: CPT

## 2017-07-21 PROCEDURE — 99239 HOSP IP/OBS DSCHRG MGMT >30: CPT

## 2017-07-21 PROCEDURE — 96374 THER/PROPH/DIAG INJ IV PUSH: CPT

## 2017-07-21 PROCEDURE — 81003 URINALYSIS AUTO W/O SCOPE: CPT

## 2017-07-21 PROCEDURE — 85025 COMPLETE CBC W/AUTO DIFF WBC: CPT

## 2017-07-21 PROCEDURE — 93010 ELECTROCARDIOGRAM REPORT: CPT

## 2017-07-21 PROCEDURE — 85610 PROTHROMBIN TIME: CPT

## 2017-07-21 PROCEDURE — 85027 COMPLETE CBC AUTOMATED: CPT

## 2017-07-21 PROCEDURE — 84100 ASSAY OF PHOSPHORUS: CPT

## 2017-07-21 PROCEDURE — 86900 BLOOD TYPING SEROLOGIC ABO: CPT

## 2017-07-21 PROCEDURE — 36430 TRANSFUSION BLD/BLD COMPNT: CPT

## 2017-07-21 PROCEDURE — 80061 LIPID PANEL: CPT

## 2017-07-21 PROCEDURE — 93005 ELECTROCARDIOGRAM TRACING: CPT

## 2017-07-21 PROCEDURE — 80053 COMPREHEN METABOLIC PANEL: CPT

## 2017-07-21 PROCEDURE — 99285 EMERGENCY DEPT VISIT HI MDM: CPT | Mod: 25

## 2017-07-21 RX ORDER — CALCIUM ACETATE 667 MG
2 TABLET ORAL
Qty: 180 | Refills: 0 | OUTPATIENT
Start: 2017-07-21 | End: 2017-08-20

## 2017-07-21 RX ORDER — ASCORBIC ACID 60 MG
1 TABLET,CHEWABLE ORAL
Qty: 30 | Refills: 0 | OUTPATIENT
Start: 2017-07-21 | End: 2017-08-20

## 2017-07-21 RX ORDER — MAGNESIUM SULFATE 500 MG/ML
2 VIAL (ML) INJECTION ONCE
Qty: 0 | Refills: 0 | Status: DISCONTINUED | OUTPATIENT
Start: 2017-07-21 | End: 2017-07-21

## 2017-07-21 RX ORDER — SODIUM BICARBONATE 1 MEQ/ML
1 SYRINGE (ML) INTRAVENOUS
Qty: 60 | Refills: 0 | OUTPATIENT
Start: 2017-07-21 | End: 2017-08-20

## 2017-07-21 RX ORDER — HYDRALAZINE HCL 50 MG
3 TABLET ORAL
Qty: 360 | Refills: 0 | OUTPATIENT
Start: 2017-07-21 | End: 2017-08-20

## 2017-07-21 RX ORDER — PREGABALIN 225 MG/1
1 CAPSULE ORAL
Qty: 0 | Refills: 0 | COMMUNITY

## 2017-07-21 RX ORDER — CARVEDILOL PHOSPHATE 80 MG/1
1 CAPSULE, EXTENDED RELEASE ORAL
Qty: 60 | Refills: 0 | OUTPATIENT
Start: 2017-07-21 | End: 2017-08-20

## 2017-07-21 RX ORDER — PANTOPRAZOLE SODIUM 20 MG/1
1 TABLET, DELAYED RELEASE ORAL
Qty: 30 | Refills: 0 | OUTPATIENT
Start: 2017-07-21 | End: 2017-08-20

## 2017-07-21 RX ADMIN — Medication 5 MILLIGRAM(S): at 06:03

## 2017-07-21 RX ADMIN — Medication 75 MILLIGRAM(S): at 13:18

## 2017-07-21 RX ADMIN — LORATADINE 10 MILLIGRAM(S): 10 TABLET ORAL at 13:18

## 2017-07-21 RX ADMIN — Medication 75 MILLIGRAM(S): at 06:03

## 2017-07-21 RX ADMIN — Medication 1334 MILLIGRAM(S): at 13:18

## 2017-07-21 RX ADMIN — CARVEDILOL PHOSPHATE 12.5 MILLIGRAM(S): 80 CAPSULE, EXTENDED RELEASE ORAL at 06:03

## 2017-07-21 RX ADMIN — Medication 1334 MILLIGRAM(S): at 08:46

## 2017-07-21 RX ADMIN — Medication 20 MILLIGRAM(S): at 06:03

## 2017-07-21 RX ADMIN — PANTOPRAZOLE SODIUM 40 MILLIGRAM(S): 20 TABLET, DELAYED RELEASE ORAL at 13:18

## 2017-07-21 RX ADMIN — Medication 650 MILLIGRAM(S): at 06:03

## 2017-07-21 NOTE — PROGRESS NOTE ADULT - PROBLEM SELECTOR PLAN 8
Pt with history of SBO s/p small bowel resection in Oct 2016. Pt reports wt loss after procedure, now stable. No dietary changes at home other than small meals. States that she has rare loose stools.  -Monitor for diarrhea Pt with history of SBO s/p small bowel resection in Oct 2016. Pt reports wt loss after procedure, now stable. No dietary changes at home other than small meals. States that she has rare loose stools.  -Monitor for diarrhea  -Will discuss plan for optimal outpatient supplementation with Dr. Bryan, nutritionist

## 2017-07-21 NOTE — DISCHARGE NOTE ADULT - CARE PROVIDER_API CALL
Hector Bryan), Internal Medicine; Nephrology  [address] [place] [address]  Phone: [phone]  Fax: [phone]

## 2017-07-21 NOTE — DISCHARGE NOTE ADULT - MEDICATION SUMMARY - MEDICATIONS TO STOP TAKING
I will STOP taking the medications listed below when I get home from the hospital:    calcium carbonate 1250 mg (500 mg elemental calcium) oral tablet  -- 1 tab(s) by mouth every 6 hours    calcium acetate 667 mg oral capsule  -- 1 cap(s) by mouth 3 times a day    calcium carbonate 1250 mg (500 mg elemental calcium) oral tablet  -- 1 tab(s) by mouth every 6 hours

## 2017-07-21 NOTE — PROGRESS NOTE ADULT - ASSESSMENT
85F with HTN, CAD, CKD 4, SBO s/p small bowel resection, sent in by outpatient for acute hypocalcemia and hypomagnesemia since resolved, with a hospital course complicated by episodes of hypertensive urgency and 2 days of NBNB emesis.
85F with HTN, CAD, CKD 4, SBO s/p small bowel resection c/b chronic hypocalcemia, presenting with acute hypocalcemia and hypomagnesemia after receiving call from PCP.
85F with HTN, CAD, CKD 4, SBO s/p small bowel resection c/b chronic hypocalcemia, presenting with acute hypocalcemia and hypomagnesemia after receiving call from PCP.
85F with HTN, CAD, CKD 4, SBO s/p small bowel resection, sent in by outpatient for acute hypocalcemia and hypomagnesemia since resolved, now with hypertensive urgency and NBNB emesis x 2 days.     Problem/Plan - 1:  ·  Problem: Hypertensive urgency.  Plan: Pt with HTN on carvedilol 6.25mg BID at home, hydralazine 50mg four times daily. Unknown baseline pressures, but currently with SBP 190s-200s though without HA, blurry vision or other signs of end organ dysfunction.  -c/w hydralazine 75mg four times a day (increased on [facility])  -c/w home carvedilol 6.25mg BID, tamulosin 0.4mg QHS  -c/w torsemide 20mg daily  -pushed 10mg labetalol x1  -Increased carvedilol to 12.5mg BID starting tonight  -will consider adding amlodipine if blood pressures remain uncontrolled.     Problem/Plan - 2:  ·  Problem: Electrolyte imbalance.  Plan: Pt found to have Ca 5.0 and Mg 0.5 on outpatient labs despite po supplementation at home due to sizeable small bowel resection. Today Ca 7.1, corrected to 8.2. Mg 2.0. Currently on CaCO3 1250mg TID, calcium acetate 1334mg TID. EKG without changes thus far.  -Per Dr. Bryan note, will stop calcium carbonate and continue calcium acetate.  -Goal Ca>8, Mg>2  -Will continue bicarb with goal of HCO3 > 20 per Dr. Bryan  -Will continue to follow serum Cr  -Continue with daily EKGs to monitor for QT prolongation  -Nutrition consult    #Vomiting -- pt with NBNB emesis x 2 days after eating turkey burger, non-toxic appearing with no fevers, no recent sick contacts, no abdominal pain. Able to tolerate po. May be related to small bowel resection and Ca/Mg supplementation.  -Zofran given for nausea today  -Will continue to monitor.     Problem/Plan - 3:  ·  Problem: Anemia.  Plan: Pt with Hgb <7 during this admission, s/p 1U PRBC, now with Hgb 9.3 (7/20). Per PCP Dr. Hoyt, pt's baseline is 8-10. Pt has had gastric ulcer in the past but had recent workup so no need for GI work up at present. Anemia more likely 2/2 CKD and anemia of chronic disease given ferritin in 5/2017 >1200.  -Will trend CBC  -Protonix 40mg for GI PPX  -EPO managed by nephrologyPt with Hgb <7 during this admission, s/p 1U PRBC, now with Hgb 9.3 (7/20). Per PCP Dr. Hoyt, pt's baseline is 8-10. Pt has had gastric ulcer in the past but had recent workup so no need for GI work up at present. Anemia more likely 2/2 CKD at present.  -Will trend CBC  -Protonix 40mg for GI PPX  -EPO managed by nephrology.
Hypocalcemia and hypomagnesemia improved. Acidosis improved. Anemia improved. CKD stable. HTN better controlled.    Suggest:    1. Continue ca/mg replacement. Ok to stop ca carbonate and continue calcium acetate.  2. Continue bicarb, aim HCO3 > 20.  3. Procrit 10,000 u sq weekly (first dose already given).  4. Continue BP meds.  5. Follow SCr.    Please call with any questions.    Hector Bryan MD, FACP, FASN | kidney.[facility]  Nephrology, Hypertension, and Internal Medicine  Mobile: [phone] (Daytime Hours Only)  Office/Answering Service: [phone]  Asst. Prof. of Medicine, [employer]  [facility] - Nephrology at [address]  [address], [address], [place], [place]
85F with HTN, CAD, CKD 4, SBO s/p small bowel resection, sent in by outpatient for acute hypocalcemia and hypomagnesemia since resolved, now with hypertensive urgency and NBNB emesis x 2 days.

## 2017-07-21 NOTE — DISCHARGE NOTE ADULT - CARE PLAN
Principal Discharge DX:	Hypocalcemia  Goal:	Maintain normal calcium and magnesium levels  Instructions for follow-up, activity and diet:	You were admitted because of low calcium and magnesium levels which can be dangerous for your heart. 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 You received calcium and magnesium supplementation in the hospital and your levels returned to the normal range. Please continue to eat a balanced sodium and cholesterol restricted diet and continue to take calcium acetate 1334 mg three times a day with meals. This medication binds something called phosphate, which allows the body to better retain calcium. Additionally, please follow up with your nephrologist Dr. Bryan on August 4th at 12:40PM or call to schedule a sooner appointment.  Secondary Diagnosis:	Hypertensive urgency  Goal:	Better blood pressure control  Instructions for follow-up, activity and diet:	You have a prior diagnosis of hypertension. In the hospital, you had very high blood pressures, which increases your risk of heart attack and stroke as well as kidney damage. In the hospital there were some changes to your home blood pressure medications and you were ultimately managed with carvedilol 12.5mg twice a day, hydralazine 75mg four times a day, and torsemide 20mg daily. Please continue to take these medications as an outpatient and follow up with Dr. Bryan to discuss further fine tuning of this regimen to achieve optimal blood pressure control.  Secondary Diagnosis:	Anemia  Instructions for follow-up, activity and diet:	You have a prior diagnosis of anemia, or low red blood cell counts. This is likely due to low iron as well as your chronic kidney disease. You received one dose of epogen in the hospital as well as one unit of blood, with improvement of your blood counts. Please follow up with Dr. Bryan as above for continued epogen injections and management of your anemia.  Secondary Diagnosis:	Stage 4 chronic kidney disease  Instructions for follow-up, activity and diet:	You have a known diagnosis of stage four chronic kidney disease, which means that your kidneys have to work harder to filter your blood. Please continue to take calcium acetate 1334mg three times a day as well as torsemide 20mg daily  Secondary Diagnosis:	Temporal arteritis  Secondary Diagnosis:	Valvular disease  Secondary Diagnosis:	SBO (small bowel obstruction) Principal Discharge DX:	Hypocalcemia  Goal:	Maintain normal calcium and magnesium levels  Instructions for follow-up, activity and diet:	You were admitted because of low calcium and magnesium levels which can be dangerous for your heart. 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 You received calcium and magnesium supplementation in the hospital and your levels returned to the normal range. Please continue to eat a balanced sodium and cholesterol restricted diet and continue to take calcium acetate 1334 mg three times a day with meals. This medication binds something called phosphate, which allows the body to better retain calcium. Additionally, please follow up with your nephrologist Dr. Bryan on August 4th at 12:40PM or call to schedule a sooner appointment.  Secondary Diagnosis:	Hypertensive urgency  Goal:	Better blood pressure control  Instructions for follow-up, activity and diet:	You have a prior diagnosis of hypertension. In the hospital, you had very high blood pressures, which increases your risk of heart attack and stroke as well as kidney damage. In the hospital there were some changes to your home blood pressure medications and you were ultimately managed with carvedilol 12.5mg twice a day, hydralazine 75mg four times a day, and torsemide 20mg daily. Please continue to take these medications as an outpatient and follow up with Dr. Bryan to discuss further fine tuning of this regimen to achieve optimal blood pressure control.  Secondary Diagnosis:	Anemia  Instructions for follow-up, activity and diet:	You have a prior diagnosis of anemia, or low red blood cell counts. This is likely due to low iron as well as your chronic kidney disease. You received one dose of epogen in the hospital as well as one unit of blood, with improvement of your blood counts. Please follow up with Dr. Bryan as above for continued epogen injections and management of your anemia.  Secondary Diagnosis:	Stage 4 chronic kidney disease  Instructions for follow-up, activity and diet:	You have a known diagnosis of stage four chronic kidney disease, which means that your kidneys have to work harder to filter your blood. Please continue to take calcium acetate 1334mg three times a day as well as torsemide 20mg daily and follow up with Dr. Bryan.  Secondary Diagnosis:	Temporal arteritis  Instructions for follow-up, activity and diet:	You have a known diagnosis of temporal arteritis, for which you take 5mg daily of prednisone. Please continue to take this medication and follow up with your rheumatologist for continued management of this condition. Principal Discharge DX:	Hypocalcemia  Goal:	Maintain normal calcium and magnesium levels  Instructions for follow-up, activity and diet:	You were admitted because of low calcium and magnesium levels which can be dangerous for your heart. 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 You received calcium and magnesium supplementation in the hospital and your levels returned to the normal range. Please continue to eat a balanced sodium and cholesterol restricted diet and continue to take calcium acetate 1334 mg three times a day with meals. This medication binds something called phosphate, which allows the body to better retain calcium. Additionally, please follow up with your nephrologist Dr. Bryan on August 4th at 12:40PM or call to schedule a sooner appointment.  Secondary Diagnosis:	Hypertensive urgency  Goal:	Better blood pressure control  Instructions for follow-up, activity and diet:	You have a prior diagnosis of hypertension. In the hospital, you had very high blood pressures, which increases your risk of heart attack and stroke as well as kidney damage. In the hospital there were some changes to your home blood pressure medications and you were ultimately managed with the following medications:  - carvedilol 12.5mg twice a day  - hydralazine 75mg four times a day  - torsemide 20mg daily    Please continue to take these medications as an outpatient and follow up with Dr. Bryan to discuss further fine tuning of this regimen to achieve optimal blood pressure control.  Secondary Diagnosis:	Anemia  Instructions for follow-up, activity and diet:	You have a prior diagnosis of anemia, or low red blood cell counts. This is likely due to low iron as well as your chronic kidney disease. You received one dose of epogen in the hospital as well as one unit of blood, with improvement of your blood counts. Please follow up with Dr. Bryan as above for continued epogen injections and management of your anemia.  Secondary Diagnosis:	Stage 4 chronic kidney disease  Instructions for follow-up, activity and diet:	You have a known diagnosis of stage four chronic kidney disease, which means that your kidneys have to work harder to filter your blood. Please continue to take calcium acetate 1334mg three times a day as well as torsemide 20mg daily and follow up with Dr. Bryan.  Secondary Diagnosis:	Temporal arteritis  Instructions for follow-up, activity and diet:	You have a known diagnosis of temporal arteritis, for which you take 5mg daily of prednisone. Please continue to take this medication and follow up with your rheumatologist for continued management of this condition.

## 2017-07-21 NOTE — PROGRESS NOTE ADULT - SUBJECTIVE AND OBJECTIVE BOX
INTERVAL HPI/OVERNIGHT EVENTS:  Patient seen and examined at bedside, resting comfortably. No episodes of vomiting overnight, able to tolerate some po. Patient denies fever, chills, dizziness, weakness, CP, palpitations, SOB, cough, N/V/D/C, dysuria, changes in bowel movements.    VITAL SIGNS:  T(C): 36.8 (21 Jul 2017 05:43), Max: 36.9 (20 Jul 2017 21:45)  T(F): 98.2 (21 Jul 2017 05:43), Max: 98.5 (20 Jul 2017 21:45)  HR: 76 (21 Jul 2017 05:43) (68 - 82)  BP: 162/67 (21 Jul 2017 05:43) (158/72 - 200/83)  RR: 16 (21 Jul 2017 05:43) (16 - 18)  SpO2: 100% (21 Jul 2017 05:43) (96% - 100%)      PHYSICAL EXAM:  Constitutional: WDWN, thin  woman in NAD  HEENT: R pupil reactive to light, L pupil nonreactive, EOMI, sclera non-icteric, conjunctival hemorrhage inferior to iris in L eye unchanged, supple, no masses, no JVD  Respiratory: CTA b/l, good air entry b/l, no wheezing, rhonchi, rales, with normal respiratory effort and no intercostal retractions  Cardiovascular: regular, normal S1S2, II/VI systolic murmur heard diffusely, with radiation to carotids and axilla   Gastrointestinal: soft, NTND, no masses palpable, +BS in all four quadrants  Extremities: WWP, 2+ radial and DP pulses, 1+ pitting edema to the ankles  Neurological: AAOx3, CNII-XII grossly intact  Skin: Normal temperature, no ulcers, erythematous macular rash on b/l lower legs      MEDICATIONS  (STANDING):  loratadine 10 milliGRAM(s) Oral daily  tamsulosin 0.4 milliGRAM(s) Oral at bedtime  predniSONE   Tablet 5 milliGRAM(s) Oral daily  calcium acetate 1334 milliGRAM(s) Oral three times a day with meals  epoetin suhas Injectable 97765 Unit(s) SubCutaneous once  hydrALAZINE 75 milliGRAM(s) Oral four times a day  sodium bicarbonate 650 milliGRAM(s) Oral two times a day  torsemide 20 milliGRAM(s) Oral daily  pantoprazole  Injectable 40 milliGRAM(s) IV Push daily  carvedilol 12.5 milliGRAM(s) Oral every 12 hours  magnesium sulfate  IVPB 2 Gram(s) IV Intermittent once    MEDICATIONS  (PRN):  acetaminophen   Tablet. 650 milliGRAM(s) Oral every 6 hours PRN Moderate Pain (4 - 6)  ondansetron Injectable 4 milliGRAM(s) IV Push once PRN Nausea and/or Vomiting      Allergies    angiotensin converting enzyme inhibitors (Angioedema)  aspirin (Hives; Rash)  lactose (Diarrhea)  penicillin (Angioedema)  Seafood (Short breath)  shellfish (Short breath)  spinach (Unknown)    Intolerances        LABS:                        8.3    5.2   )-----------( 117      ( 21 Jul 2017 06:15 )             25.4     07-21    140  |  109<H>  |  50<H>  ----------------------------<  61<L>  3.9   |  16<L>  |  2.90<H>    Ca    7.1<L>      21 Jul 2017 06:15  Phos  4.0     07-20  Mg     1.7     07-21    TPro  4.9<L>  /  Alb  2.3<L>  /  TBili  0.5  /  DBili  x   /  AST  7<L>  /  ALT  <5<L>  /  AlkPhos  42  07-21          RADIOLOGY & ADDITIONAL TESTS:  Studies reviewed.

## 2017-07-21 NOTE — PROGRESS NOTE ADULT - PROBLEM SELECTOR PLAN 7
Pt complaining of new rash on b/l lower extremities since Saturday. Erythematous macules, not itchy, non-tender, no warmth.  -Monitor for change
Seen and evaluated by Dr Jones on last admission. Noted to have mild aortic regurgitation with moderate AS with h/o CAD. No ASA 2/2 ASA allergy. No plavix 2/2 h/o anemia.  - monitor for signs of overload
Seen and evaluated by Dr Jones on last admission. Noted to have mild aortic regurgitation with moderate AS with h/o CAD. No ASA 2/2 ASA allergy. No plavix 2/2 h/o anemia.  - monitor for signs of overload
Pt complaining of new rash on b/l lower extremities since Saturday. Erythematous macules, not itchy, non-tender, no warmth.  -Monitor for change

## 2017-07-21 NOTE — PROGRESS NOTE ADULT - PROBLEM SELECTOR PLAN 2
Pt found to have Ca 5.0 and Mg 0.5 on outpatient labs despite po supplementation at home due to sizeable small bowel resection. Today Ca 7.1, corrected to 8.5. Mg 1.7. Currently on calcium acetate 1334mg TID, s/p calcium carbonate. EKG without changes thus far.  -continue calcium acetate  -Goal Ca>8, Mg>2  -Will continue bicarb with goal of HCO3 > 20 per Dr. Bryan  -Will continue to follow serum Cr  -Continue with daily EKGs to monitor for QT prolongation  -Nutrition consult    #Vomiting -- pt with NBNB emesis x 2 days after eating turkey burger, non-toxic appearing with no fevers, no recent sick contacts, no abdominal pain. Able to tolerate po. May be related to small bowel resection and Ca/Mg supplementation. No episodes of emesis today.  -Zofran PRN for nausea if QT normal  -Will continue to monitor Pt found to have Ca 5.0 and Mg 0.5 on outpatient labs despite po supplementation at home due to sizeable small bowel resection. Today Ca 7.1, corrected to 8.5. Mg 1.7. Currently on calcium acetate 1334mg TID, s/p calcium carbonate. EKG without changes thus far.  -continue calcium acetate  -Goal Ca>8, Mg>2  -Will continue bicarb with goal of HCO3 > 20 per Dr. Bryan, will discuss increasing bicarb supplementation to TID dosing  -Will continue to follow serum Cr  -Continue with daily EKGs to monitor for QT prolongation  -Nutrition consult  -Will discuss case with Dr. Bryan regarding discharging on calcium supplementation  #Vomiting -- pt with NBNB emesis x 2 days after eating turkey burger, non-toxic appearing with no fevers, no recent sick contacts, no abdominal pain. Able to tolerate po. May be related to small bowel resection and Ca/Mg supplementation. No episodes of emesis today.  -Zofran PRN for nausea if QT normal  -Will continue to monitor

## 2017-07-21 NOTE — PROGRESS NOTE ADULT - PROBLEM SELECTOR PLAN 10
SCDs  DNR/DNI
DVT prophylaxis - SCDs    DNR/DNI    Admit to [facility]
DVT prophylaxis - SCDs    DNR/DNI    Admit to [facility]
SCDs  DNR/DNI

## 2017-07-21 NOTE — PROGRESS NOTE ADULT - PROBLEM SELECTOR PLAN 1
Pt with HTN on carvedilol 6.25mg BID at home, hydralazine 50mg four times daily. Unknown baseline pressures, but currently with episodes of SBP 190s-200s though without HA, blurry vision or other signs of end organ dysfunction.  -c/w hydralazine 75mg four times a day  -c/w carvedilol 12.5mg BID   -c/w tamulosin 0.4mg QHS  -c/w torsemide 20mg daily  -will consider adding amlodipine if blood pressures remain uncontrolled Pt with HTN on carvedilol 6.25mg BID at home, hydralazine 50mg four times daily. Unknown baseline pressures, but currently with episodes of SBP 190s-200s though without HA, blurry vision or other signs of end organ dysfunction. Today blood pressures 160s-170s.  -c/w hydralazine 75mg four times a day  -c/w carvedilol 12.5mg BID   -c/w tamulosin 0.4mg QHS  -c/w torsemide 20mg daily  -will discuss with Dr. Bryan, may consider increasing hydralazine or adding additional agent to improve blood pressure control

## 2017-07-21 NOTE — DISCHARGE NOTE ADULT - MEDICATION SUMMARY - MEDICATIONS TO CHANGE
I will SWITCH the dose or number of times a day I take the medications listed below when I get home from the hospital:    hydrALAZINE 50 mg oral tablet  -- 1 tab(s) by mouth 3 times a day    Coreg 6.25 mg oral tablet  -- 1 tab(s) by mouth 2 times a day

## 2017-07-21 NOTE — DISCHARGE NOTE ADULT - PLAN OF CARE
Maintain normal calcium and magnesium levels You were admitted because of low calcium and magnesium levels which can be dangerous for your heart. 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 You received calcium and magnesium supplementation in the hospital and your levels returned to the normal range. Please continue to eat a balanced sodium and cholesterol restricted diet and continue to take calcium acetate 1334 mg three times a day with meals. This medication binds something called phosphate, which allows the body to better retain calcium. Additionally, please follow up with your nephrologist Dr. Bryan on August 4th at 12:40PM or call to schedule a sooner appointment. Better blood pressure control You have a prior diagnosis of hypertension. In the hospital, you had very high blood pressures, which increases your risk of heart attack and stroke as well as kidney damage. In the hospital there were some changes to your home blood pressure medications and you were ultimately managed with carvedilol 12.5mg twice a day, hydralazine 75mg four times a day, and torsemide 20mg daily. Please continue to take these medications as an outpatient and follow up with Dr. Bryan to discuss further fine tuning of this regimen to achieve optimal blood pressure control. You have a prior diagnosis of anemia, or low red blood cell counts. This is likely due to low iron as well as your chronic kidney disease. You received one dose of epogen in the hospital as well as one unit of blood, with improvement of your blood counts. Please follow up with Dr. Bryan as above for continued epogen injections and management of your anemia. You have a known diagnosis of stage four chronic kidney disease, which means that your kidneys have to work harder to filter your blood. Please continue to take calcium acetate 1334mg three times a day as well as torsemide 20mg daily You have a known diagnosis of temporal arteritis, for which you take 5mg daily of prednisone. Please continue to take this medication and follow up with your rheumatologist for continued management of this condition. You have a known diagnosis of stage four chronic kidney disease, which means that your kidneys have to work harder to filter your blood. Please continue to take calcium acetate 1334mg three times a day as well as torsemide 20mg daily and follow up with Dr. Bryan. You have a prior diagnosis of hypertension. In the hospital, you had very high blood pressures, which increases your risk of heart attack and stroke as well as kidney damage. In the hospital there were some changes to your home blood pressure medications and you were ultimately managed with the following medications:  - carvedilol 12.5mg twice a day  - hydralazine 75mg four times a day  - torsemide 20mg daily    Please continue to take these medications as an outpatient and follow up with Dr. Bryan to discuss further fine tuning of this regimen to achieve optimal blood pressure control.

## 2017-07-21 NOTE — PROGRESS NOTE ADULT - PROBLEM SELECTOR PLAN 5
Pt with known valvular disease, evaluated by Dr. Jones on last admission with echo demonstrating moderate AS and moderate MR with h/o CAD. No ASA 2/2 allergy, no plavix 2/2 h/o anemia.  -will be cautious with fluids  -monitor for signs of overload
Patient has a history of temporal arteritis  - continue home dose of prednisone 5mg
Patient has a history of temporal arteritis  - continue home dose of prednisone 5mg
Pt with known valvular disease, evaluated by Dr. Jones on last admission with echo demonstrating moderate AS and moderate MR with h/o CAD. No ASA 2/2 allergy, no plavix 2/2 h/o anemia.  -will be cautious with fluids  -monitor for signs of overload

## 2017-07-21 NOTE — DISCHARGE NOTE ADULT - PATIENT PORTAL LINK FT
“You can access the Follow[facility] Patient Portal, offered by [facility], by registering with the following website: [email or web address]”

## 2017-07-21 NOTE — DISCHARGE NOTE ADULT - HOSPITAL COURSE
85F with HTN, HLD, CAD, CKD 4, SBO s/p resection in 10/2016, anemia, hypocalcemia, temporal arteritis sent in by outpatient Nephrologist for hypocalcemia (5.5) and hypomagnesemia (<0.5) associated with mild fatigue. Pt was admitted to medicine and followed by Dr. Bryan, already known to the patient. She received calcium and magnesium supplementation with correction of her levels to Ca 8.5 (when corrected for albumin) and Mg 1.7. Her hospital course was complicated by multiple episodes of hypertensive urgency with SBPs 190s-220s for which she received IV labetalol 10mg on three occasions. Her home blood pressure regimen was adjusted for better control to hydralazine 75mg four times a day, carvedilol 12.5mg twice daily, torsemide 10mg daily, with improvement of her pressures to SBP 160s-170s. During her hospitalization she was also found to be anemic to 6.7 (baseline 8-10) on routine blood work for which she received 1U packed red blood cells and one dose of epogen, with improvement of her anemia to 8.3 on the day of discharge. Additionally, she was started on a proton pump inhibitor in light of her chronic steroid use and prior diagnosis of gastric ulcer in order to prevent possible GI blood loss.        Upon transfer to [facility], patient was resting comfortably. She complained of nausea with two episodes of vomiting yesterday and 1 episode this morning, clear, nonbloody non bilious. Able to tolerate medications po and small amounts of food. She was observed to have 1 episode of vomiting during this interview and received zofran. 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 No recent fevers, no sick contacts, no CP, SOB, dysuria or constipation/diarrhea. 85F with HTN, HLD, CAD, CKD 4, SBO s/p resection in 10/2016, anemia, hypocalcemia, temporal arteritis sent in by outpatient Nephrologist for hypocalcemia (5.5) and hypomagnesemia (<0.5) associated with mild fatigue. Pt was admitted to medicine and followed by Dr. Bryan, already known to the patient. She received calcium and magnesium supplementation with correction of her levels to Ca 8.5 (when corrected for albumin) and Mg 1.7. Her hospital course was complicated by multiple episodes of hypertensive urgency with SBPs 190s-220s for which she received IV labetalol 10mg on three occasions. Her home blood pressure regimen was adjusted for better control to hydralazine 75mg four times a day, carvedilol 12.5mg twice daily, torsemide 10mg daily, with improvement of her pressures to SBP 160s-170s. During her hospitalization she was also found to be anemic to 6.7 (baseline 8-10) on routine blood work for which she received 1U packed red blood cells and one dose of epogen, with improvement of her anemia to 8.3 on the day of discharge. Additionally, she was started on a proton pump inhibitor in light of her chronic steroid use and prior diagnosis of gastric ulcer in order to prevent possible GI blood loss. While hospitalized the patient had 2 days of nonbloody nonbilious emesis without fever or signs of infectious origin. She received zofran 4mg once and subsequently had no further episodes on the day prior to discharge. She remained able to tolerate medications and food orally. The patient was medically optimized, stable and ready for discharge. Plan of care and return precautions were discussed with the patient who verbally stated understanding. Patient advised to follow up with Dr. Bryan (Nephrology) on August 4th at 12:40PM or sooner if available and to schedule an appointment for follow up with her PCP Dr. Hoyt.

## 2017-07-21 NOTE — DISCHARGE NOTE ADULT - NSFTFSERV1RD_GEN_ALL_CORE
rehabilitation services/medication teaching and assessment/teaching and training/observation and assessment

## 2017-07-21 NOTE — DISCHARGE NOTE ADULT - SECONDARY DIAGNOSIS.
Hypertensive urgency Anemia Stage 4 chronic kidney disease Temporal arteritis Valvular disease SBO (small bowel obstruction)

## 2017-07-21 NOTE — DISCHARGE NOTE ADULT - MEDICATION SUMMARY - MEDICATIONS TO TAKE
I will START or STAY ON the medications listed below when I get home from the hospital:    predniSONE 5 mg oral delayed release tablet  -- 1 tab(s) by mouth once a day  -- Indication: For Temporal arteritis    acetaminophen 325 mg oral tablet  -- 2 tab(s) by mouth every 6 hours, As needed, Moderate Pain (4 - 6)  -- Indication: For Pain    sodium bicarbonate 650 mg oral tablet  -- 1 tab(s) by mouth 2 times a day  -- Indication: For Chronic kidney disease    tamsulosin 0.4 mg oral capsule  -- 1 cap(s) by mouth once a day (at bedtime)  -- Indication: For Chronic kidney disease    loratadine 10 mg oral tablet  -- 1 tab(s) by mouth once a day  -- Indication: For Nutrition, metabolism, and development symptoms    Coreg 12.5 mg oral tablet  -- 1 tab(s) by mouth 2 times a day  -- It is very important that you take or use this exactly as directed.  Do not skip doses or discontinue unless directed by your doctor.  May cause drowsiness.  Alcohol may intensify this effect.  Use care when operating dangerous machinery.  Some non-prescription drugs may aggravate your condition.  Read all labels carefully.  If a warning appears, check with your doctor before taking.  Take with food or milk.    -- Indication: For Essential hypertension    levalbuterol 0.63 mg/3 mL inhalation solution  -- 3 milliliter(s) inhaled every 4 hours, As needed, shortness of breath  -- Indication: For Asthma    torsemide 20 mg oral tablet  -- 1 tab(s) by mouth once a day  -- Indication: For Essential hypertension    calcium acetate 667 mg oral tablet  -- 2 tab(s) by mouth 3 times a day  -- Indication: For Chronic kidney disease    pantoprazole 40 mg oral delayed release tablet  -- 1 milligram(s) by mouth once a day  -- It is very important that you take or use this exactly as directed.  Do not skip doses or discontinue unless directed by your doctor.  Obtain medical advice before taking any non-prescription drugs as some may affect the action of this medication.  Swallow whole.  Do not crush.    -- Indication: For GERD    hydrALAZINE 25 mg oral tablet  -- 3 tab(s) by mouth 4 times a day  -- Indication: For Essential hypertension    Acerola 500 mg oral tablet, chewable  -- 1 tab(s) by mouth once a day  -- Chew tablets before swallowing    -- Indication: For Nutrition, metabolism, and development symptoms    Vitamin B12 1000 mcg oral tablet  -- 1 tab(s) by mouth once a day  -- Indication: For Nutrition, metabolism, and development symptoms    folic acid 1 mg oral tablet  -- 1 tab(s) by mouth once a day  -- Indication: For Nutrition, metabolism, and development symptoms    thiamine 100 mg oral tablet  -- 1 tab(s) by mouth once a day  -- Indication: For Nutrition, metabolism, and development symptoms

## 2017-07-21 NOTE — PROGRESS NOTE ADULT - PROBLEM SELECTOR PROBLEM 8
SBO (small bowel obstruction)

## 2017-07-21 NOTE — DISCHARGE NOTE ADULT - CARE PROVIDERS DIRECT ADDRESSES
,[email or web address]

## 2017-07-21 NOTE — DISCHARGE NOTE ADULT - VISION (WITH CORRECTIVE LENSES IF THE PATIENT USUALLY WEARS THEM):
blind in L eye/Partially impaired: cannot see medication labels or newsprint, but can see obstacles in path, and the surrounding layout; can count fingers at arm's length

## 2017-07-21 NOTE — DISCHARGE NOTE ADULT - ADDITIONAL INSTRUCTIONS
Dr. Hector Bryan -- August 4th, 2017 at 12:40PM. You may be called if a sooner appointment becomes available. Dr. Hector Bryan -- August 4th, 2017 at 12:40PM. You may be called if a sooner appointment becomes available.  [address] [place] [address], [address], [place], [place]

## 2017-07-24 DIAGNOSIS — E73.9 LACTOSE INTOLERANCE, UNSPECIFIED: ICD-10-CM

## 2017-07-24 DIAGNOSIS — D63.1 ANEMIA IN CHRONIC KIDNEY DISEASE: ICD-10-CM

## 2017-07-24 DIAGNOSIS — R11.2 NAUSEA WITH VOMITING, UNSPECIFIED: ICD-10-CM

## 2017-07-24 DIAGNOSIS — Z88.8 ALLERGY STATUS TO OTHER DRUGS, MEDICAMENTS AND BIOLOGICAL SUBSTANCES STATUS: ICD-10-CM

## 2017-07-24 DIAGNOSIS — M31.6 OTHER GIANT CELL ARTERITIS: ICD-10-CM

## 2017-07-24 DIAGNOSIS — K21.9 GASTRO-ESOPHAGEAL REFLUX DISEASE WITHOUT ESOPHAGITIS: ICD-10-CM

## 2017-07-24 DIAGNOSIS — I16.0 HYPERTENSIVE URGENCY: ICD-10-CM

## 2017-07-24 DIAGNOSIS — Z91.013 ALLERGY TO SEAFOOD: ICD-10-CM

## 2017-07-24 DIAGNOSIS — E83.51 HYPOCALCEMIA: ICD-10-CM

## 2017-07-24 DIAGNOSIS — I25.10 ATHEROSCLEROTIC HEART DISEASE OF NATIVE CORONARY ARTERY WITHOUT ANGINA PECTORIS: ICD-10-CM

## 2017-07-24 DIAGNOSIS — J45.909 UNSPECIFIED ASTHMA, UNCOMPLICATED: ICD-10-CM

## 2017-07-24 DIAGNOSIS — Z90.710 ACQUIRED ABSENCE OF BOTH CERVIX AND UTERUS: ICD-10-CM

## 2017-07-24 DIAGNOSIS — I25.2 OLD MYOCARDIAL INFARCTION: ICD-10-CM

## 2017-07-24 DIAGNOSIS — Z90.49 ACQUIRED ABSENCE OF OTHER SPECIFIED PARTS OF DIGESTIVE TRACT: ICD-10-CM

## 2017-07-24 DIAGNOSIS — N18.4 CHRONIC KIDNEY DISEASE, STAGE 4 (SEVERE): ICD-10-CM

## 2017-07-24 DIAGNOSIS — I35.0 NONRHEUMATIC AORTIC (VALVE) STENOSIS: ICD-10-CM

## 2017-07-24 DIAGNOSIS — Z88.0 ALLERGY STATUS TO PENICILLIN: ICD-10-CM

## 2017-07-24 DIAGNOSIS — Z79.52 LONG TERM (CURRENT) USE OF SYSTEMIC STEROIDS: ICD-10-CM

## 2017-07-24 DIAGNOSIS — Z66 DO NOT RESUSCITATE: ICD-10-CM

## 2017-07-24 DIAGNOSIS — E83.42 HYPOMAGNESEMIA: ICD-10-CM

## 2017-07-24 DIAGNOSIS — R26.89 OTHER ABNORMALITIES OF GAIT AND MOBILITY: ICD-10-CM

## 2017-07-24 DIAGNOSIS — E87.2 ACIDOSIS: ICD-10-CM

## 2017-07-24 DIAGNOSIS — D50.9 IRON DEFICIENCY ANEMIA, UNSPECIFIED: ICD-10-CM

## 2017-07-24 DIAGNOSIS — Z87.11 PERSONAL HISTORY OF PEPTIC ULCER DISEASE: ICD-10-CM

## 2017-07-24 DIAGNOSIS — I12.9 HYPERTENSIVE CHRONIC KIDNEY DISEASE WITH STAGE 1 THROUGH STAGE 4 CHRONIC KIDNEY DISEASE, OR UNSPECIFIED CHRONIC KIDNEY DISEASE: ICD-10-CM

## 2017-07-26 ENCOUNTER — MOBILE ON CALL (OUTPATIENT)
Age: 82
End: 2017-07-26

## 2017-08-11 ENCOUNTER — LABORATORY RESULT (OUTPATIENT)
Age: 82
End: 2017-08-11

## 2017-08-11 ENCOUNTER — APPOINTMENT (OUTPATIENT)
Dept: NEPHROLOGY | Facility: CLINIC | Age: 82
End: 2017-08-11
Payer: MEDICARE

## 2017-08-11 VITALS — SYSTOLIC BLOOD PRESSURE: 150 MMHG | DIASTOLIC BLOOD PRESSURE: 70 MMHG

## 2017-08-11 VITALS — HEART RATE: 72 BPM | DIASTOLIC BLOOD PRESSURE: 72 MMHG | SYSTOLIC BLOOD PRESSURE: 132 MMHG

## 2017-08-11 DIAGNOSIS — M06.9 RHEUMATOID ARTHRITIS, UNSPECIFIED: ICD-10-CM

## 2017-08-11 PROCEDURE — 36415 COLL VENOUS BLD VENIPUNCTURE: CPT

## 2017-08-11 PROCEDURE — 99215 OFFICE O/P EST HI 40 MIN: CPT | Mod: 25

## 2017-08-12 LAB
25(OH)D3 SERPL-MCNC: 26 NG/ML
ALBUMIN SERPL ELPH-MCNC: 3.1 G/DL
ALP BLD-CCNC: 46 U/L
ALT SERPL-CCNC: 5 U/L
ANION GAP SERPL CALC-SCNC: 19 MMOL/L
AST SERPL-CCNC: 10 U/L
BASOPHILS # BLD AUTO: 0 K/UL
BASOPHILS NFR BLD AUTO: 0 %
BILIRUB SERPL-MCNC: 0.4 MG/DL
BUN SERPL-MCNC: 58 MG/DL
CALCIUM SERPL-MCNC: 6.9 MG/DL
CALCIUM SERPL-MCNC: 6.9 MG/DL
CHLORIDE SERPL-SCNC: 106 MMOL/L
CHOLEST SERPL-MCNC: 181 MG/DL
CHOLEST/HDLC SERPL: 3.1 RATIO
CO2 SERPL-SCNC: 19 MMOL/L
CREAT SERPL-MCNC: 3.41 MG/DL
EOSINOPHIL # BLD AUTO: 0 K/UL
EOSINOPHIL NFR BLD AUTO: 0 %
GLUCOSE SERPL-MCNC: 94 MG/DL
HBA1C MFR BLD HPLC: 4 %
HCT VFR BLD CALC: 27.2 %
HDLC SERPL-MCNC: 59 MG/DL
HGB BLD-MCNC: 8.4 G/DL
LDLC SERPL CALC-MCNC: 96 MG/DL
LYMPHOCYTES # BLD AUTO: 0.44 K/UL
LYMPHOCYTES NFR BLD AUTO: 7.2 %
MAGNESIUM SERPL-MCNC: 0.7 MG/DL
MAN DIFF?: NORMAL
MCHC RBC-ENTMCNC: 23.9 PG
MCHC RBC-ENTMCNC: 30.9 GM/DL
MCV RBC AUTO: 77.3 FL
MONOCYTES # BLD AUTO: 0.33 K/UL
MONOCYTES NFR BLD AUTO: 5.4 %
NEUTROPHILS # BLD AUTO: 5.37 K/UL
NEUTROPHILS NFR BLD AUTO: 84.7 %
PARATHYROID HORMONE INTACT: 104 PG/ML
PHOSPHATE SERPL-MCNC: 3.5 MG/DL
PLATELET # BLD AUTO: 202 K/UL
POTASSIUM SERPL-SCNC: 4 MMOL/L
PROT SERPL-MCNC: 5.7 G/DL
RBC # BLD: 3.52 M/UL
RBC # FLD: 21.5 %
SODIUM SERPL-SCNC: 144 MMOL/L
TRIGL SERPL-MCNC: 128 MG/DL
TSH SERPL-ACNC: 2.09 UIU/ML
URATE SERPL-MCNC: 10.9 MG/DL
WBC # FLD AUTO: 6.14 K/UL

## 2017-08-12 RX ORDER — MAGNESIUM OXIDE 241.3 MG/1000MG
400 TABLET ORAL TWICE DAILY
Qty: 180 | Refills: 3 | Status: ACTIVE | COMMUNITY
Start: 2017-08-12 | End: 1900-01-01

## 2017-08-14 ENCOUNTER — APPOINTMENT (OUTPATIENT)
Dept: NEPHROLOGY | Facility: CLINIC | Age: 82
End: 2017-08-14

## 2017-08-19 RX ORDER — SODIUM BICARBONATE 650 MG/1
650 TABLET ORAL TWICE DAILY
Qty: 180 | Refills: 3 | Status: ACTIVE | COMMUNITY
Start: 2017-08-19 | End: 1900-01-01

## 2017-09-07 ENCOUNTER — APPOINTMENT (OUTPATIENT)
Dept: NEPHROLOGY | Facility: CLINIC | Age: 82
End: 2017-09-07
Payer: MEDICARE

## 2017-09-07 ENCOUNTER — LABORATORY RESULT (OUTPATIENT)
Age: 82
End: 2017-09-07

## 2017-09-07 VITALS — SYSTOLIC BLOOD PRESSURE: 187 MMHG | DIASTOLIC BLOOD PRESSURE: 78 MMHG

## 2017-09-07 VITALS — DIASTOLIC BLOOD PRESSURE: 69 MMHG | SYSTOLIC BLOOD PRESSURE: 180 MMHG | HEART RATE: 67 BPM

## 2017-09-07 DIAGNOSIS — D63.1 CHRONIC KIDNEY DISEASE, UNSPECIFIED: ICD-10-CM

## 2017-09-07 DIAGNOSIS — N18.9 CHRONIC KIDNEY DISEASE, UNSPECIFIED: ICD-10-CM

## 2017-09-07 PROCEDURE — 36415 COLL VENOUS BLD VENIPUNCTURE: CPT

## 2017-09-07 PROCEDURE — 99215 OFFICE O/P EST HI 40 MIN: CPT | Mod: 25

## 2017-09-07 RX ORDER — TORSEMIDE 20 MG/1
20 TABLET ORAL
Qty: 180 | Refills: 3 | Status: ACTIVE | COMMUNITY
Start: 2017-07-17 | End: 1900-01-01

## 2017-09-17 LAB
ALBUMIN SERPL ELPH-MCNC: 3 G/DL
ALP BLD-CCNC: 57 U/L
ALT SERPL-CCNC: 9 U/L
ANION GAP SERPL CALC-SCNC: 13 MMOL/L
APPEARANCE: ABNORMAL
AST SERPL-CCNC: 10 U/L
BASOPHILS # BLD AUTO: 0.02 K/UL
BASOPHILS NFR BLD AUTO: 0.4 %
BILIRUB SERPL-MCNC: 0.3 MG/DL
BILIRUBIN URINE: NEGATIVE
BLOOD URINE: NEGATIVE
BUN SERPL-MCNC: 54 MG/DL
CALCIUM SERPL-MCNC: 8.7 MG/DL
CHLORIDE SERPL-SCNC: 104 MMOL/L
CO2 SERPL-SCNC: 25 MMOL/L
COLOR: YELLOW
CREAT SERPL-MCNC: 3.69 MG/DL
EOSINOPHIL # BLD AUTO: 0.04 K/UL
EOSINOPHIL NFR BLD AUTO: 0.8 %
GLUCOSE QUALITATIVE U: NORMAL MG/DL
GLUCOSE SERPL-MCNC: 101 MG/DL
HCT VFR BLD CALC: 25.6 %
HGB BLD-MCNC: 8 G/DL
IMM GRANULOCYTES NFR BLD AUTO: 0.2 %
KETONES URINE: NEGATIVE
LEUKOCYTE ESTERASE URINE: ABNORMAL
LYMPHOCYTES # BLD AUTO: 0.8 K/UL
LYMPHOCYTES NFR BLD AUTO: 15.4 %
MAGNESIUM SERPL-MCNC: 1.7 MG/DL
MAN DIFF?: NORMAL
MCHC RBC-ENTMCNC: 24 PG
MCHC RBC-ENTMCNC: 31.3 GM/DL
MCV RBC AUTO: 76.9 FL
MONOCYTES # BLD AUTO: 0.44 K/UL
MONOCYTES NFR BLD AUTO: 8.4 %
NEUTROPHILS # BLD AUTO: 3.9 K/UL
NEUTROPHILS NFR BLD AUTO: 74.8 %
NITRITE URINE: NEGATIVE
PH URINE: 6.5
PLATELET # BLD AUTO: 190 K/UL
POTASSIUM SERPL-SCNC: 4.9 MMOL/L
PROT SERPL-MCNC: 5.7 G/DL
PROTEIN URINE: 300 MG/DL
RBC # BLD: 3.33 M/UL
RBC # FLD: 21.9 %
SODIUM SERPL-SCNC: 142 MMOL/L
SPECIFIC GRAVITY URINE: 1.01
UROBILINOGEN URINE: NORMAL MG/DL
WBC # FLD AUTO: 5.21 K/UL

## 2017-09-20 ENCOUNTER — RECORD ABSTRACTING (OUTPATIENT)
Age: 82
End: 2017-09-20

## 2017-09-20 RX ORDER — HYDRALAZINE HYDROCHLORIDE 25 MG/1
25 TABLET ORAL
Qty: 540 | Refills: 3 | Status: DISCONTINUED | COMMUNITY
Start: 2017-08-11 | End: 2017-09-20

## 2017-09-24 ENCOUNTER — LABORATORY RESULT (OUTPATIENT)
Age: 82
End: 2017-09-24

## 2017-09-25 ENCOUNTER — APPOINTMENT (OUTPATIENT)
Dept: NEPHROLOGY | Facility: CLINIC | Age: 82
End: 2017-09-25
Payer: MEDICARE

## 2017-09-25 VITALS — DIASTOLIC BLOOD PRESSURE: 67 MMHG | HEART RATE: 64 BPM | SYSTOLIC BLOOD PRESSURE: 166 MMHG

## 2017-09-25 VITALS — WEIGHT: 119 LBS | BODY MASS INDEX: 21.77 KG/M2

## 2017-09-25 DIAGNOSIS — Z23 ENCOUNTER FOR IMMUNIZATION: ICD-10-CM

## 2017-09-25 DIAGNOSIS — E83.42 HYPOMAGNESEMIA: ICD-10-CM

## 2017-09-25 DIAGNOSIS — I50.22 CHRONIC SYSTOLIC (CONGESTIVE) HEART FAILURE: ICD-10-CM

## 2017-09-25 DIAGNOSIS — I10 ESSENTIAL (PRIMARY) HYPERTENSION: ICD-10-CM

## 2017-09-25 DIAGNOSIS — E87.70 FLUID OVERLOAD, UNSPECIFIED: ICD-10-CM

## 2017-09-25 DIAGNOSIS — N18.4 CHRONIC KIDNEY DISEASE, STAGE 4 (SEVERE): ICD-10-CM

## 2017-09-25 PROCEDURE — 99215 OFFICE O/P EST HI 40 MIN: CPT | Mod: 25

## 2017-09-25 PROCEDURE — 36415 COLL VENOUS BLD VENIPUNCTURE: CPT

## 2017-09-25 RX ORDER — HYDRALAZINE HYDROCHLORIDE 50 MG/1
50 TABLET ORAL
Qty: 270 | Refills: 3 | Status: ACTIVE | COMMUNITY
Start: 1900-01-01 | End: 1900-01-01

## 2017-09-26 ENCOUNTER — MED ADMIN CHARGE (OUTPATIENT)
Age: 82
End: 2017-09-26

## 2017-10-02 LAB
ALBUMIN SERPL ELPH-MCNC: 3.5 G/DL
ALP BLD-CCNC: 55 U/L
ALT SERPL-CCNC: 5 U/L
ANION GAP SERPL CALC-SCNC: 18 MMOL/L
AST SERPL-CCNC: 14 U/L
BASOPHILS # BLD AUTO: 0.02 K/UL
BASOPHILS NFR BLD AUTO: 0.4 %
BILIRUB SERPL-MCNC: 0.4 MG/DL
BUN SERPL-MCNC: 53 MG/DL
CALCIUM SERPL-MCNC: 8.5 MG/DL
CHLORIDE SERPL-SCNC: 98 MMOL/L
CO2 SERPL-SCNC: 23 MMOL/L
CREAT SERPL-MCNC: 4.54 MG/DL
EOSINOPHIL # BLD AUTO: 0.02 K/UL
EOSINOPHIL NFR BLD AUTO: 0.4 %
GLUCOSE SERPL-MCNC: 89 MG/DL
HCT VFR BLD CALC: 24.6 %
HGB BLD-MCNC: 7.8 G/DL
IMM GRANULOCYTES NFR BLD AUTO: 0.2 %
LYMPHOCYTES # BLD AUTO: 0.91 K/UL
LYMPHOCYTES NFR BLD AUTO: 16.8 %
MAGNESIUM SERPL-MCNC: 1.7 MG/DL
MAN DIFF?: NORMAL
MCHC RBC-ENTMCNC: 23.9 PG
MCHC RBC-ENTMCNC: 31.7 GM/DL
MCV RBC AUTO: 75.2 FL
MONOCYTES # BLD AUTO: 0.39 K/UL
MONOCYTES NFR BLD AUTO: 7.2 %
NEUTROPHILS # BLD AUTO: 4.08 K/UL
NEUTROPHILS NFR BLD AUTO: 75 %
PLATELET # BLD AUTO: 184 K/UL
POTASSIUM SERPL-SCNC: 4.6 MMOL/L
PROT SERPL-MCNC: 6.2 G/DL
RBC # BLD: 3.27 M/UL
RBC # FLD: 21 %
SODIUM SERPL-SCNC: 139 MMOL/L
WBC # FLD AUTO: 5.43 K/UL

## 2017-10-09 ENCOUNTER — APPOINTMENT (OUTPATIENT)
Dept: NEPHROLOGY | Facility: CLINIC | Age: 82
End: 2017-10-09

## 2017-10-10 ENCOUNTER — APPOINTMENT (OUTPATIENT)
Dept: NEPHROLOGY | Facility: CLINIC | Age: 82
End: 2017-10-10

## 2017-10-16 RX ORDER — ERYTHROPOIETIN 20000 [IU]/ML
20000 INJECTION, SOLUTION INTRAVENOUS; SUBCUTANEOUS
Qty: 2 | Refills: 5 | Status: ACTIVE | COMMUNITY
Start: 2017-07-17 | End: 1900-01-01

## 2017-11-06 ENCOUNTER — APPOINTMENT (OUTPATIENT)
Dept: HEART AND VASCULAR | Facility: CLINIC | Age: 82
End: 2017-11-06

## 2017-11-21 ENCOUNTER — APPOINTMENT (OUTPATIENT)
Dept: NEPHROLOGY | Facility: CLINIC | Age: 82
End: 2017-11-21

## 2017-11-24 ENCOUNTER — TRANSCRIPTION ENCOUNTER (OUTPATIENT)
Age: 82
End: 2017-11-24

## 2017-11-24 LAB
% GAMMA, URINE: 18.6 % — SIGNIFICANT CHANGE UP
ALBUMIN 24H MFR UR ELPH: 58.5 % — SIGNIFICANT CHANGE UP
ALPHA1 GLOB 24H MFR UR ELPH: 6.6 % — SIGNIFICANT CHANGE UP
ALPHA2 GLOB 24H MFR UR ELPH: 8 % — SIGNIFICANT CHANGE UP
B-GLOBULIN 24H MFR UR ELPH: 8.3 % — SIGNIFICANT CHANGE UP
INTERPRETATION 24H UR IFE-IMP: SIGNIFICANT CHANGE UP
M PROTEIN 24H UR ELPH-MRATE: SIGNIFICANT CHANGE UP
PROT ?TM UR-MCNC: 91 MG/DL — HIGH (ref 0–12)
PROT ?TM UR-MCNC: 91 MG/DL — HIGH (ref 0–12)
PROT PATTERN 24H UR ELPH-IMP: SIGNIFICANT CHANGE UP

## 2018-02-08 NOTE — H&P ADULT. - DOES THIS PATIENT HAVE A HISTORY OF OR HAS BEEN DX WITH HEART FAILURE?
yes
Detail Level: Detailed
Plan: Area was cleaned today with peroxide.  Continue to clean area with peroxide , soap and water. Advised patient to keep area uncovered when at home.
Continue Regimen: Bactrim

## 2019-07-19 NOTE — DIETITIAN INITIAL EVALUATION ADULT. - WEIGHT CHANGE
Spoke to pt to relay info below. Pt verbalized understanding. Advised pt this is a one time script. Pt verbalized understanding and appreciation. Informed pt he would need to p/u prior to 3 pm today. Pt states he will leave now to arrive in plenty of time. yes

## 2019-08-03 NOTE — PROGRESS NOTE ADULT - PROBLEM SELECTOR PLAN 3
Provider at bedside       Ousmane Carbajal RN  08/03/19 1049 Pt with Hgb <7 during this admission, s/p 1U PRBC, now with Hgb 9.3 (7/20). Per PCP Dr. Hoyt, pt's baseline is 8-10. Pt has had gastric ulcer in the past but had recent workup so no need for GI work up at present. Anemia more likely 2/2 CKD and anemia of chronic disease given ferritin in 5/2017 >1200.  -Will trend CBC  -Protonix 40mg for GI PPX  -EPO managed by nephrology Pt with Hgb <7 during this admission, s/p 1U PRBC, now with Hgb 9.3 (7/20). Per PCP Dr. Hoyt, pt's baseline is 8-10. Pt has had gastric ulcer in the past but had recent workup so no need for GI work up at present. Anemia more likely mixed iron deficiency with anemia of chronic disease given ferritin in 5/2017 >1200.  -Will trend CBC  -Protonix 40mg for GI PPX  -EPO managed by nephrology

## 2022-08-30 NOTE — DISCHARGE NOTE ADULT - NS MD DC FALL RISK RISK
For information on Fall & Injury Prevention, visit [email or web address]
details…

## 2023-04-26 NOTE — ED PROVIDER NOTE - CPE EDP EYES NORM
normal... A-T Advancement Flap Text: The defect edges were debeveled with a #15 scalpel blade. Given the location of the defect, shape of the defect and the proximity to free margins an A-T advancement flap was deemed most appropriate. Using a sterile surgical marker, an appropriate advancement flap was drawn incorporating the defect and placing the expected incisions within the relaxed skin tension lines where possible. The area thus outlined was incised deep to adipose tissue with a #15 scalpel blade. The skin margins were undermined to an appropriate distance in all directions utilizing iris scissors. Following this, the designed flap was advanced and carried over into the primary defect and sutured into place.

## 2023-06-07 NOTE — ED ADULT NURSE NOTE - NS ED PATIENT SAFETY CONCERN
noted with + H pylori antigen in stool  Bismuth quadruple therapy sent to pharmacy   emergency contact made aware Stephanie   1 week follow up with GI noted with + H pylori antigen in stool  Bismuth quadruple therapy sent to pharmacy   emergency contact made aware Stephanie   2weeks follow up with GI for retesting No

## 2023-09-22 NOTE — ED PROVIDER NOTE - CPE EDP MUSC NORM
Called pt to give pre-op instructions for afib ablation procedure Monday. Pt aware that a responsible adult must drive and accompany pt home after sedation. Explained the importance of maintaining NPO status after midnight before procedure. Pt to hold Eliquis, metformin, tramadol, omega 3, co q10, all vitamins in AM, take other meds with small sip of water. Pt understands and agrees to all pre-op instructions.    normal...

## 2023-10-19 NOTE — PROGRESS NOTE ADULT - PROBLEM/PLAN-5
DISPLAY PLAN FREE TEXT
Island Pedicle Flap With Canthal Suspension Text: The defect edges were debeveled with a #15 scalpel blade.  Given the location of the defect, shape of the defect and the proximity to free margins an island pedicle advancement flap was deemed most appropriate.  Using a sterile surgical marker, an appropriate advancement flap was drawn incorporating the defect, outlining the appropriate donor tissue and placing the expected incisions within the relaxed skin tension lines where possible. The area thus outlined was incised deep to adipose tissue with a #15 scalpel blade.  The skin margins were undermined to an appropriate distance in all directions around the primary defect and laterally outward around the island pedicle utilizing iris scissors.  There was minimal undermining beneath the pedicle flap. A suspension suture was placed in the canthal tendon to prevent tension and prevent ectropion.

## 2025-02-27 NOTE — PHYSICAL THERAPY INITIAL EVALUATION ADULT - ASSISTIVE DEVICE FOR TRANSFER: STAND/SIT, REHAB EVAL
*   ADEQUATE   FLUID  INTAKE   AND  ELECTROLYTE  BALANCE             * KEEP  DAIRY  OF   THE  NEUROLOGICAL  SYMPTOMS          *  TO  MAINTAIN  REGULAR  SLEEP  WAKE  CYCLES.     *   TO  HAVE  ADEQUATE  REST  AND   SLEEP    HOURS.          *    AVOID  USAGE OF   TOBACCO,  EXCESSIVE  ALCOHOL                AND   ILLEGAL   SUBSTANCES,  IF  ANY          *  Maintain   Healthy  Life Style    With   Periodic  Monitoring  Of         Any  Medical  Conditions  Including   Elevated  Blood  Pressure,  Lipid  Profile,       Blood  Sugar levels  And   Heart  Disease.                *   Period   Screening  For  Cancers  Involving  Breast,  Colon,         Lungs  And  Other  Organs  As  Applicable,           In consultation   With  Your  Primary Care Providers.                *  If   There is  Any  Significant  Worsening   Of  Current  Symptoms  And             Or  If    Any additional  New  Neurological  Symptoms  and          Significant  Concerns   Should  Call  911 or  Go  To  Emergency  Department            For  Further  Immediate  Evaluation.         rolling walker